# Patient Record
Sex: FEMALE | Race: WHITE | NOT HISPANIC OR LATINO | Employment: FULL TIME | URBAN - METROPOLITAN AREA
[De-identification: names, ages, dates, MRNs, and addresses within clinical notes are randomized per-mention and may not be internally consistent; named-entity substitution may affect disease eponyms.]

---

## 2017-01-24 DIAGNOSIS — E78.00 HIGH CHOLESTEROL: Primary | ICD-10-CM

## 2017-01-25 RX ORDER — SIMVASTATIN 40 MG
40 TABLET ORAL AT BEDTIME
Qty: 90 TABLET | Refills: 3 | Status: SHIPPED | OUTPATIENT
Start: 2017-01-25 | End: 2018-04-23

## 2017-01-25 NOTE — TELEPHONE ENCOUNTER
simvastatin (ZOCOR) 40 MG      Last Written Prescription Date:  10/24/16  Last Fill Quantity: 90,   # refills: 0  Last Office Visit : 10/24/16  Future Office visit:  3/14/17  Recent Labs   Lab Test  10/20/14   1325  08/05/13   1700   CHOL  174  189   HDL  70  59   LDL  89  102   TRIG  71  141   CHOLHDLRATIO  2.5  3.2   Routing refill request to provider for review/approval because:  OVER DUE FLP 2014

## 2017-03-14 ENCOUNTER — OFFICE VISIT (OUTPATIENT)
Dept: ENDOCRINOLOGY | Facility: CLINIC | Age: 47
End: 2017-03-14

## 2017-03-14 VITALS
SYSTOLIC BLOOD PRESSURE: 127 MMHG | DIASTOLIC BLOOD PRESSURE: 83 MMHG | HEIGHT: 60 IN | WEIGHT: 179.3 LBS | BODY MASS INDEX: 35.2 KG/M2 | HEART RATE: 87 BPM

## 2017-03-14 DIAGNOSIS — E10.9 TYPE 1 DIABETES MELLITUS WITHOUT COMPLICATION (H): Primary | ICD-10-CM

## 2017-03-14 LAB — HBA1C MFR BLD: 7.1 % (ref 4.3–6)

## 2017-03-14 NOTE — NURSING NOTE
Performed A1C test - patient tolerated well.    Irene Antoine CMA       Chief Complaint   Patient presents with     RECHECK     DIABETES TYPE 1 F/U        Initial /83 (BP Location: Right arm, Patient Position: Chair, Cuff Size: Adult Regular)  Pulse 87  Ht 1.524 m (5') Estimated body mass index is 34.69 kg/(m^2) as calculated from the following:    Height as of 10/24/16: 1.524 m (5').    Weight as of 10/24/16: 80.6 kg (177 lb 9.6 oz).  Medication Reconciliation: complete

## 2017-03-14 NOTE — MR AVS SNAPSHOT
After Visit Summary   3/14/2017    Coral Painting    MRN: [id number]           Patient Information     Date Of Birth          1970        Visit Information        Provider Department      3/14/2017 3:30 PM Henna Ness MD [facility]         Follow-ups after your visit        Follow-up notes from your care team     Return for f/u 3 mo with Melonie Coughlin and 6 mo with me.      Your next 10 appointments already scheduled     Jun 26, 2017  7:30 AM CDT   (Arrive by 7:15 AM)   RETURN DIABETES with MARLON Lucia [facility] End[facility] ([facility])    [address] [place] [id number] [phone]            Sep 26, 2017  2:30 PM CDT   (Arrive by 2:15 PM)   RETURN DIABETES with MD DAVE Steel [facility] Endocrinology ([facility])    [address] [place] [address] [phone]              Who to contact     Please call your clinic at [phone] to:    Ask questions about your health    Make or cancel appointments    Discuss your medicines    Learn about your test results    Speak to your doctor   If you have compliments or concerns about an experience at your clinic, or if you wish to file a complaint, please contact [facility] Patient Relations at [phone] or email us at [email or web address]         Additional Information About Your Visit        MyChart Information     DianDian is an electronic gateway that provides easy, online access to your medical records. With DianDian, you can request a clinic appointment, read your test results, renew a prescription or communicate with your care team.     To sign up for Needlet visit the website at [email or web address]   You will be asked to enter the access code listed below, as well as some personal information. Please follow the directions to create your username  and password.     Your access code is: [email or web address]  Expires: 2017  7:30 AM     Your access code will  in 90 days. If you need help or a new code, please contact your [facility] or call [phone] for assistance.        Care EveryWhere ID     This is your Care EveryWhere ID. This could be used by other organizations to access your [place] medical records  [id number]        Your Vitals Were     Pulse Height BMI (Body Mass Index)             87 1.524 m (5') 35.02 kg/m2          Blood Pressure from Last 3 Encounters:   17 127/83   10/24/16 126/85   07/21/15 114/68    Weight from Last 3 Encounters:   17 81.3 kg (179 lb 4.8 oz)   10/24/16 80.6 kg (177 lb 9.6 oz)   07/21/15 78.4 kg (172 lb 14.4 oz)              Today, you had the following     No orders found for display       Primary Care Provider Office Phone # Fax #    Sagrario Whitehorse [facility] [phone] [address] [address]        Thank you!     Thank you for choosing [facility] ENDOCRINOLOGY  for your care. Our goal is always to provide you with excellent care. Hearing back from our patients is one way we can continue to improve our services. Please take a few minutes to complete the written survey that you may receive in the mail after your visit with us. Thank you!             Your Updated Medication List - Protect others around you: Learn how to safely use, store and throw away your medicines at www.disposemymeds.org.          This list is accurate as of: 3/14/17  4:14 PM.  Always use your most recent med list.                   Brand Name Dispense Instructions for use    blood glucose lancing device     1 kit    Use as directed       blood glucose monitoring lancets     400 each    Test 4 times daily       blood glucose monitoring test strip    ONE TOUCH ULTRA    400 each    USE TO TEST FOUR TIMES A DAY       * HumaLOG KWIKpen 100 UNIT/ML injection   Generic  drug:  insulin lispro     3 Month    Use as directed up to 60 units per day       * HumaLOG KWIKpen 100 UNIT/ML injection   Generic drug:  insulin lispro     60 mL    Use as directed up to 60 units per day, Disp-3 Month, R-3, PUJA, E-Prescribe       * insulin pen needle 31G X 8 MM      Use 4 times daily.       * insulin pen needle 31G X 8 MM    B-D U/F    360 each    Use as directed for injections 4 times daily       * LANTUS SOLOSTAR 100 UNIT/ML injection   Generic drug:  insulin glargine     30 mL    Inject 23 Units Subcutaneous At Bedtime       * insulin glargine 100 UNIT/ML injection    LANTUS    25 mL    Inject 23 Units Subcutaneous At Bedtime       simvastatin 40 MG tablet    ZOCOR    90 tablet    Take 1 tablet (40 mg) by mouth At Bedtime       * Notice:  This list has 6 medication(s) that are the same as other medications prescribed for you. Read the directions carefully, and ask your doctor or other care provider to review them with you.

## 2017-03-14 NOTE — LETTER
3/14/2017       RE: Coral Painting  [address]   [facility] [address]     Dear Colleague,    Thank you for referring your patient, Coral Painting, to the [facility] ENDOCRINOLOGY at [facility]. Please see a copy of my visit note below.    This 46 year old woman is here for f/u of her type 1 diabetes and hyperlipemia. She takes lantus 23 units in the evening (9-10 pm) and novolog 3 units per carb.  She uses a correction of 1 to drop 25 with goal of 120. She forgot her meter at home today so we have no data to review.  Overall she thinks things are going pretty well.  She isn't surprised that her A1c went up from last visit. . She isn't having many lows.   Those she has are not troubling to her and are simply treated with juice.      She hasn't seen her eye MD for more than one  Year and will make an appt to see him soon. She has been taking her zocor regularly. at bedtime.  She has no feet concerns.  She is still having menses.      Current Outpatient Prescriptions on File Prior to Visit:  simvastatin (ZOCOR) 40 MG tablet Take 1 tablet (40 mg) by mouth At Bedtime   blood glucose monitoring (ONE TOUCH ULTRA) test strip USE TO TEST FOUR TIMES A DAY   HUMALOG KWIKPEN 100 UNIT/ML soln Use as directed up to 60 units per day, Disp-3 Month, R-3, PUJA, E-Prescribe   insulin glargine (LANTUS) 100 UNIT/ML PEN Inject 23 Units Subcutaneous At Bedtime   insulin pen needle (B-D U/F) 31G X 8 MM Use as directed for injections 4 times daily   LANTUS SOLOSTAR 100 UNIT/ML soln Inject 23 Units Subcutaneous At Bedtime   HUMALOG KWIKPEN SOLN 100 UNIT/ML Use as directed up to 60 units per day   SOFTCLIX LANCETS MISC Test 4 times daily   Lancets Misc. (ACCU-CHEK SOFTCLIX LANCET DEV) KIT Use as directed   Insulin Pen Needle 31G X 8 MM MISC Use 4 times daily.      No current facility-administered medications on file prior to visit.     ROS: 10 point ROS neg other than the symptoms noted  above in the HPI.    So Hx -  Feels a bit stressed.  She plans to look for a new job and get a new car.    Vital signs:   /83 (BP Location: Right arm, Patient Position: Chair, Cuff Size: Adult Regular)  Pulse 87  Ht 1.524 m (5')  Wt 81.3 kg (179 lb 4.8 oz)  BMI 35.02 kg/m2  Estimated body mass index is 35.02 kg/(m^2) as calculated from the following:    Height as of this encounter: 1.524 m (5').    Weight as of this encounter: 81.3 kg (179 lb 4.8 oz).    NAD    Recent Labs   Lab Test  10/24/16   0833  10/24/16   0823 10/24/16 07/21/15   10/20/14   1325  10/20/14   1254   08/05/13   1700   08/05/13   1642  02/04/13   1605   A1C   --    --    --    --    --    --    --    --    --    --   6.6*  7.5*   HEMOGLOBINA1   --    --   6.6*  7.4*   < >   --    --    < >   --    --    --    --    TSH   --   2.80   --    --    --    --    --    --    --    --    --    --    LDL   --    --    --    --    --   89   --    --   102   --    --    --    HDL   --    --    --    --    --   70   --    --   59   --    --    --    TRIG   --    --    --    --    --   71   --    --   141   --    --    --    CR   --   0.74   --    --    --   0.66   --    --   0.66   --    --    --    MICROL  6   --    --    --    --    --   10   --    --    < >   --    --     < > = values in this interval not displayed.       A1c today 7.1    Assessment and plan:    1.  Diabetes control. She is doing well.  No changes.    2.  Diabetes complications.  Up to date with screens and has none.    3. CVD risk.  Patient is on an appropriate dose of statin for the risk factors present.  BP is OK    F/u with Melonie Coughlin in 3 mo and me in 6 months    I spent 15 min with her  the majority of which were spent counseling about diabetes management    Henna Ness MD

## 2017-03-14 NOTE — PROGRESS NOTES
This 46 year old woman is here for f/u of her type 1 diabetes and hyperlipemia. She takes lantus 23 units in the evening (9-10 pm) and novolog 3 units per carb.  She uses a correction of 1 to drop 25 with goal of 120. She forgot her meter at home today so we have no data to review.  Overall she thinks things are going pretty well.  She isn't surprised that her A1c went up from last visit. . She isn't having many lows.   Those she has are not troubling to her and are simply treated with juice.      She hasn't seen her eye MD for more than one  Year and will make an appt to see him soon. She has been taking her zocor regularly. at bedtime.  She has no feet concerns.  She is still having menses.      Current Outpatient Prescriptions on File Prior to Visit:  simvastatin (ZOCOR) 40 MG tablet Take 1 tablet (40 mg) by mouth At Bedtime   blood glucose monitoring (ONE TOUCH ULTRA) test strip USE TO TEST FOUR TIMES A DAY   HUMALOG KWIKPEN 100 UNIT/ML soln Use as directed up to 60 units per day, Disp-3 Month, R-3, PUJA, E-Prescribe   insulin glargine (LANTUS) 100 UNIT/ML PEN Inject 23 Units Subcutaneous At Bedtime   insulin pen needle (B-D U/F) 31G X 8 MM Use as directed for injections 4 times daily   LANTUS SOLOSTAR 100 UNIT/ML soln Inject 23 Units Subcutaneous At Bedtime   HUMALOG KWIKPEN SOLN 100 UNIT/ML Use as directed up to 60 units per day   SOFTCLIX LANCETS MISC Test 4 times daily   Lancets Misc. (ACCU-CHEK SOFTCLIX LANCET DEV) KIT Use as directed   Insulin Pen Needle 31G X 8 MM MISC Use 4 times daily.      No current facility-administered medications on file prior to visit.     ROS: 10 point ROS neg other than the symptoms noted above in the HPI.    So Hx -  Feels a bit stressed.  She plans to look for a new job and get a new car.    Vital signs:   /83 (BP Location: Right arm, Patient Position: Chair, Cuff Size: Adult Regular)  Pulse 87  Ht 1.524 m (5')  Wt 81.3 kg (179 lb 4.8 oz)  BMI 35.02 kg/m2  Estimated body  mass index is 35.02 kg/(m^2) as calculated from the following:    Height as of this encounter: 1.524 m (5').    Weight as of this encounter: 81.3 kg (179 lb 4.8 oz).    NAD    Recent Labs   Lab Test  10/24/16   0833  10/24/16   0823 10/24/16 07/21/15   10/20/14   1325  10/20/14   1254   08/05/13   1700   08/05/13   1642  02/04/13   1605   A1C   --    --    --    --    --    --    --    --    --    --   6.6*  7.5*   HEMOGLOBINA1   --    --   6.6*  7.4*   < >   --    --    < >   --    --    --    --    TSH   --   2.80   --    --    --    --    --    --    --    --    --    --    LDL   --    --    --    --    --   89   --    --   102   --    --    --    HDL   --    --    --    --    --   70   --    --   59   --    --    --    TRIG   --    --    --    --    --   71   --    --   141   --    --    --    CR   --   0.74   --    --    --   0.66   --    --   0.66   --    --    --    MICROL  6   --    --    --    --    --   10   --    --    < >   --    --     < > = values in this interval not displayed.       A1c today 7.1    Assessment and plan:    1.  Diabetes control. She is doing well.  No changes.    2.  Diabetes complications.  Up to date with screens and has none.    3. CVD risk.  Patient is on an appropriate dose of statin for the risk factors present.  BP is OK    F/u with Melonie Coughlin in 3 mo and me in 6 months    I spent 15 min with her  the majority of which were spent counseling about diabetes management    Henna Ness MD

## 2017-08-14 ENCOUNTER — OFFICE VISIT (OUTPATIENT)
Dept: ENDOCRINOLOGY | Facility: CLINIC | Age: 47
End: 2017-08-14

## 2017-08-14 VITALS
HEART RATE: 89 BPM | BODY MASS INDEX: 34.67 KG/M2 | SYSTOLIC BLOOD PRESSURE: 130 MMHG | DIASTOLIC BLOOD PRESSURE: 79 MMHG | HEIGHT: 60 IN | WEIGHT: 176.6 LBS

## 2017-08-14 DIAGNOSIS — E78.2 MIXED HYPERLIPIDEMIA: Primary | ICD-10-CM

## 2017-08-14 DIAGNOSIS — E10.9 WELL CONTROLLED TYPE 1 DIABETES MELLITUS (H): ICD-10-CM

## 2017-08-14 DIAGNOSIS — R53.83 OTHER FATIGUE: ICD-10-CM

## 2017-08-14 DIAGNOSIS — E78.2 MIXED HYPERLIPIDEMIA: ICD-10-CM

## 2017-08-14 LAB
ANION GAP SERPL CALCULATED.3IONS-SCNC: 9 MMOL/L (ref 3–14)
BUN SERPL-MCNC: 10 MG/DL (ref 7–30)
CALCIUM SERPL-MCNC: 8.5 MG/DL (ref 8.5–10.1)
CHLORIDE SERPL-SCNC: 105 MMOL/L (ref 94–109)
CHOLEST SERPL-MCNC: 167 MG/DL
CO2 SERPL-SCNC: 24 MMOL/L (ref 20–32)
CREAT SERPL-MCNC: 0.66 MG/DL (ref 0.52–1.04)
GFR SERPL CREATININE-BSD FRML MDRD: ABNORMAL ML/MIN/1.7M2
GLUCOSE SERPL-MCNC: 183 MG/DL (ref 70–99)
HBA1C MFR BLD: 7.4 % (ref 4.3–6)
HDLC SERPL-MCNC: 66 MG/DL
LDLC SERPL CALC-MCNC: 87 MG/DL
NONHDLC SERPL-MCNC: 101 MG/DL
POTASSIUM SERPL-SCNC: 4 MMOL/L (ref 3.4–5.3)
SODIUM SERPL-SCNC: 138 MMOL/L (ref 133–144)
TRIGL SERPL-MCNC: 74 MG/DL
TSH SERPL DL<=0.005 MIU/L-ACNC: 2.17 MU/L (ref 0.4–4)

## 2017-08-14 RX ORDER — INSULIN LISPRO 100 [IU]/ML
INJECTION, SOLUTION INTRAVENOUS; SUBCUTANEOUS
Qty: 60 ML | Refills: 3 | Status: SHIPPED | OUTPATIENT
Start: 2017-08-14 | End: 2018-08-20

## 2017-08-14 NOTE — NURSING NOTE
Chief Complaint   Patient presents with     RECHECK     DIABETES TYPE 1 F/U        Initial /79 (BP Location: Right arm, Patient Position: Sitting, Cuff Size: Adult Regular)  Pulse 89  Ht 1.524 m (5')  Wt 80.1 kg (176 lb 9.6 oz)  BMI 34.49 kg/m2 Estimated body mass index is 34.49 kg/(m^2) as calculated from the following:    Height as of this encounter: 1.524 m (5').    Weight as of this encounter: 80.1 kg (176 lb 9.6 oz).  Medication Reconciliation: complete       Performed A1C test - patient tolerated well.    Irene Antoine, CMA

## 2017-08-14 NOTE — LETTER
8/14/2017       RE: Coral Painting  [address]   [place] [address]     Dear Colleague,    Thank you for referring your patient, Coral Painting, to the [facility] ENDOCRINOLOGY at [facility]. Please see a copy of my visit note below.    HPI:  Ms. Painting is a 45 yo woman here for f/u of type 1 diabetes.  Blood sugars have been running quite high lately and is not sure why.  She has been mostly over 200, mostly over 250-300 mg/dL. She forgot her meter today.  She has had to do numerous corrections throughout the day for the past 1-2 months.  She raised her Lantus to 32 units (she had been on 23 units prior to that).  She has had no fevers or chills, no changes in her weight.  No night sweats.  Her diet has not changed much and neither has her activity level.  She does have a lot of work stress and is looking for another job.  Humalog 3 units/15g CHO with meals plus correction of 1/25 over 175 mg/dL.  She had a recent normal eye exam.  She continues on simvastatin.  She has no other concerns today.        ROS   GENERAL: lost 5# since last visit. no fevers, chills, malaise, night sweats.   HEENT: no dysphagia, diplopia, neck pain or tenderness, dry/scratchy eyes, URI, cough, sinus drainage, tinnitus, sinus pressure  CV: no chest pain, pressure, palpitations, skipped beats, LOC  LUNGS: no SOB, MCGOVERN, cough, sputum production, wheezing   ABDOMEN: no diarrhea, constipation, abdominal pain  EXTREMITIES: no rashes, ulcers, edema.  Right shoulder limitation in ROM- seeing physical therapy.  NEUROLOGY: no changes in vision, tingling or numbness in hands or feet.   MSK: no muscle aches or pains, weakness    Personal Hx   Is a  at the [employer].  Lives alone in a condo.  Drinks socially once a week or so.    Past Medical History  Dyslipidemia  Type I Diabetes Mellitus in her late 20's. Initially diagnosed as type 2, then found to have antibody  positivity.    Physical Exam   /79 (BP Location: Right arm, Patient Position: Sitting, Cuff Size: Adult Regular)  Pulse 89  Ht 1.524 m (5')  Wt 80.1 kg (176 lb 9.6 oz)  BMI 34.49 kg/m2  GENERAL: Alert and oriented X3, NAD, well dressed, answering questions appropriately, appears stated age.  EXTREMITIES: no edema, +pulses, no rashes, no lesions    RESULTS  Lab Results   Component Value Date    A1C 6.6 08/05/2013    A1C 7.5 02/04/2013    A1C 7.3 08/01/2012    A1C 7.2 01/25/2012    A1C 6.8 10/03/2011       TSH   Date Value Ref Range Status   10/24/2016 2.80 0.40 - 4.00 mU/L Final   08/13/2007 1.86 0.4 - 5.0 mU/L Final     T4 Free   Date Value Ref Range Status   08/13/2007 1.02 0.70 - 1.85 ng/dL Final       Creatinine   Date Value Ref Range Status   10/24/2016 0.74 0.52 - 1.04 mg/dL Final   10/20/2014 0.66 0.52 - 1.04 mg/dL Final   ]    No results found for: ALBUMIN]    ALT   Date Value Ref Range Status   06/04/2012 12 0 - 50 U/L Final   04/21/2011 15 0 - 50 U/L Final   ]    Recent Labs   Lab Test  10/20/14   1325  08/05/13   1700   CHOL  174  189   HDL  70  59   LDL  89  102   TRIG  71  141   CHOLHDLRATIO  2.5  3.2       No results found for: B12]    Assessment   Assessment/Plan:      1.  Type 1 DM- Overall, running higher.  A1c is up to 7.4%.  Will make the following changes (instructions given to patient):  Work on walking daily.  This will  Improve your insulin sensitivity.      Increase Humalog to 4 units per 15g of carb.      Consider using Victoza or Trulicity- these medications can help your post-meal blood sugars improve and help you lose weight.      Send Melonie in a couple weeks with an update.      Let me know if you would like to try one of the above medications.      Schedule annual exam with your primary physician and an eye exam.      2.  Risk factors- BP and lipids under good control.  No DM complications.   Will schedule eye exam and check MA, creatinine and TSH, lipids today.     3.  F/U in  6 weeks with Dr. Ness.  Call sooner with concerns.      I spent 30 minutes with this patient face to face and explained the conditions and plans (more than 50% of time was counseling/coordination of care, diabetes management, follow up plan for worsening hyper and hypoglycemia) . The patient understood and is satisfied with today's visit.          Melonie Coughlin PA-C, MPAS   [facility]  Department of Medicine  Division of Endocrinology and Diabetes

## 2017-08-14 NOTE — PROGRESS NOTES
HPI:  Ms. Painting is a 47 yo woman here for f/u of type 1 diabetes.  Blood sugars have been running quite high lately and is not sure why.  She has been mostly over 200, mostly over 250-300 mg/dL. She forgot her meter today.  She has had to do numerous corrections throughout the day for the past 1-2 months.  She raised her Lantus to 32 units (she had been on 23 units prior to that).  She has had no fevers or chills, no changes in her weight.  No night sweats.  Her diet has not changed much and neither has her activity level.  She does have a lot of work stress and is looking for another job.  Humalog 3 units/15g CHO with meals plus correction of 1/25 over 175 mg/dL.  She had a recent normal eye exam.  She continues on simvastatin.  She has no other concerns today.        ROS   GENERAL: lost 5# since last visit. no fevers, chills, malaise, night sweats.   HEENT: no dysphagia, diplopia, neck pain or tenderness, dry/scratchy eyes, URI, cough, sinus drainage, tinnitus, sinus pressure  CV: no chest pain, pressure, palpitations, skipped beats, LOC  LUNGS: no SOB, MCGOVERN, cough, sputum production, wheezing   ABDOMEN: no diarrhea, constipation, abdominal pain  EXTREMITIES: no rashes, ulcers, edema.  Right shoulder limitation in ROM- seeing physical therapy.  NEUROLOGY: no changes in vision, tingling or numbness in hands or feet.   MSK: no muscle aches or pains, weakness    Personal Hx   Is a  at the [employer].  Lives alone in a condo.  Drinks socially once a week or so.    Past Medical History  Dyslipidemia  Type I Diabetes Mellitus in her late 20's. Initially diagnosed as type 2, then found to have antibody positivity.    Physical Exam   /79 (BP Location: Right arm, Patient Position: Sitting, Cuff Size: Adult Regular)  Pulse 89  Ht 1.524 m (5')  Wt 80.1 kg (176 lb 9.6 oz)  BMI 34.49 kg/m2  GENERAL: Alert and oriented X3, NAD, well dressed, answering questions appropriately, appears stated  age.  EXTREMITIES: no edema, +pulses, no rashes, no lesions    RESULTS  Lab Results   Component Value Date    A1C 6.6 08/05/2013    A1C 7.5 02/04/2013    A1C 7.3 08/01/2012    A1C 7.2 01/25/2012    A1C 6.8 10/03/2011       TSH   Date Value Ref Range Status   10/24/2016 2.80 0.40 - 4.00 mU/L Final   08/13/2007 1.86 0.4 - 5.0 mU/L Final     T4 Free   Date Value Ref Range Status   08/13/2007 1.02 0.70 - 1.85 ng/dL Final       Creatinine   Date Value Ref Range Status   10/24/2016 0.74 0.52 - 1.04 mg/dL Final   10/20/2014 0.66 0.52 - 1.04 mg/dL Final   ]    No results found for: ALBUMIN]    ALT   Date Value Ref Range Status   06/04/2012 12 0 - 50 U/L Final   04/21/2011 15 0 - 50 U/L Final   ]    Recent Labs   Lab Test  10/20/14   1325  08/05/13   1700   CHOL  174  189   HDL  70  59   LDL  89  102   TRIG  71  141   CHOLHDLRATIO  2.5  3.2       No results found for: B12]    Assessment   Assessment/Plan:      1.  Type 1 DM- Overall, running higher.  A1c is up to 7.4%.  Will make the following changes (instructions given to patient):  Work on walking daily.  This will  Improve your insulin sensitivity.      Increase Humalog to 4 units per 15g of carb.      Consider using Victoza or Trulicity- these medications can help your post-meal blood sugars improve and help you lose weight.      Send Melonie in a couple weeks with an update.      Let me know if you would like to try one of the above medications.      Schedule annual exam with your primary physician and an eye exam.      2.  Risk factors- BP and lipids under good control.  No DM complications.   Will schedule eye exam and check MA, creatinine and TSH, lipids today.     3.  F/U in 6 weeks with Dr. Ness.  Call sooner with concerns.      I spent 30 minutes with this patient face to face and explained the conditions and plans (more than 50% of time was counseling/coordination of care, diabetes management, follow up plan for worsening hyper and hypoglycemia) . The patient  understood and is satisfied with today's visit.          Melonie Coughlin PA-C, MPAS   [facility]  Department of Medicine  Division of Endocrinology and Diabetes

## 2017-08-14 NOTE — PATIENT INSTRUCTIONS
Work on walking daily.  This will  Improve your insulin sensitivity.      Increase Humalog to 4 units per 15g of carb.      Consider using Victoza or Trulicity- these medications can help your post-meal blood sugars improve and help you lose weight.      Send Melonie in a couple weeks with an update.      Let me know if you would like to try one of the above medications.      Schedule annual exam with your primary physician and an eye exam.

## 2017-08-14 NOTE — LETTER
Patient:  Coral Painting  :   1970  MRN:     [id number]        Ms.Tracy Painting  [address]   [place] [address]        2017    Dear ,    We are writing to inform you of your test results.  Your labs look great!      Resulted Orders   Basic metabolic panel   Result Value Ref Range    Sodium 138 133 - 144 mmol/L    Potassium 4.0 3.4 - 5.3 mmol/L    Chloride 105 94 - 109 mmol/L    Carbon Dioxide 24 20 - 32 mmol/L    Anion Gap 9 3 - 14 mmol/L    Glucose 183 (H) 70 - 99 mg/dL    Urea Nitrogen 10 7 - 30 mg/dL    Creatinine 0.66 0.52 - 1.04 mg/dL    GFR Estimate >90  Non  GFR Calc   >60 mL/min/1.7m2    GFR Estimate If Black >90   GFR Calc   >60 mL/min/1.7m2    Calcium 8.5 8.5 - 10.1 mg/dL   Hemoglobin A1c POCT   Result Value Ref Range    Hemoglobin A1C 7.4 (A) 4.3 - 6 %   It was a pleasure to see you in clinic.    Please let me know if you have any questions or concerns about this information. The best way to reach me or one of the endocrine nurses is to send a [employer] message or call [phone].      Sincerely,       Melonie Coughlin PA-C, MPAS

## 2017-08-14 NOTE — MR AVS SNAPSHOT
After Visit Summary   8/14/2017    Coral Painting    MRN: [id number]           Patient Information     Date Of Birth          1970        Visit Information        Provider Department      8/14/2017 7:30 AM Melonie Coughlin PA-C [facility]        Today's Diagnoses     Mixed hyperlipidemia    -  1    Well controlled type 1 diabetes mellitus (H)        Other fatigue          Care Instructions    Work on walking daily.  This will  Improve your insulin sensitivity.      Increase Humalog to 4 units per 15g of carb.      Consider using Victoza or Trulicity- these medications can help your post-meal blood sugars improve and help you lose weight.      Send Melonie in a couple weeks with an update.      Let me know if you would like to try one of the above medications.      Schedule annual exam with your primary physician and an eye exam.                 Follow-ups after your visit        Your next 10 appointments already scheduled     Sep 26, 2017  2:30 PM CDT   (Arrive by 2:15 PM)   RETURN DIABETES with MD DAVE Steel [facility] Endocrinology ([facility])    [address] [place] [address] [phone]              Future tests that were ordered for you today     Open Future Orders        Priority Expected Expires Ordered    TSH with free T4 reflex Routine  8/14/2018 8/14/2017    Lipid Profile Routine  8/14/2018 8/14/2017            Who to contact     Please call your clinic at [phone] to:    Ask questions about your health    Make or cancel appointments    Discuss your medicines    Learn about your test results    Speak to your doctor   If you have compliments or concerns about an experience at your clinic, or if you wish to file a complaint, please contact [facility] Patient Relations at [phone] or email us at [email or web address]         Additional Information About Your Visit         Ubersense Information     U[employer] is an electronic gateway that provides easy, online access to your medical records. With Ubersense, you can request a clinic appointment, read your test results, renew a prescription or communicate with your care team.     To sign up for U[employer] visit the website at [email or web address]   You will be asked to enter the access code listed below, as well as some personal information. Please follow the directions to create your username and password.     Your access code is: [email or web address]  Expires: 9/10/2017  6:31 AM     Your access code will  in 90 days. If you need help or a new code, please contact your [facility] or call [phone] for assistance.        Care EveryWhere ID     This is your Care EveryWhere ID. This could be used by other organizations to access your [place] medical records  [id number]        Your Vitals Were     Pulse Height BMI (Body Mass Index)             89 1.524 m (5') 34.49 kg/m2          Blood Pressure from Last 3 Encounters:   17 130/79   17 127/83   10/24/16 126/85    Weight from Last 3 Encounters:   17 80.1 kg (176 lb 9.6 oz)   17 81.3 kg (179 lb 4.8 oz)   10/24/16 80.6 kg (177 lb 9.6 oz)              We Performed the Following     Basic metabolic panel          Today's Medication Changes          These changes are accurate as of: 17  8:33 AM.  If you have any questions, ask your nurse or doctor.               These medicines have changed or have updated prescriptions.        Dose/Directions    * HumaLOG KWIKpen 100 UNIT/ML injection   This may have changed:  Another medication with the same name was changed. Make sure you understand how and when to take each.   Used for:  Diabetes mellitus type 1 (H)   Generic drug:  insulin lispro        Use as directed up to 60 units per day   Quantity:  3 Month   Refills:  3       * HumaLOG KWIKpen 100 UNIT/ML injection   This may have  changed:  additional instructions   Used for:  Well controlled type 1 diabetes mellitus (H)   Generic drug:  insulin lispro        Use as directed up to 80 units per day, Disp-3 Month, R-3, PUJA, E-Prescribe   Quantity:  60 mL   Refills:  3       * Notice:  This list has 2 medication(s) that are the same as other medications prescribed for you. Read the directions carefully, and ask your doctor or other care provider to review them with you.         Where to get your medicines      These medications were sent to [facility] - [address]  [address] [place] [id number]     Phone:  [phone]     HumaLOG KWIKpen 100 UNIT/ML injection    insulin glargine 100 UNIT/ML injection    insulin pen needle 31G X 8 MM                Primary Care Provider Office Phone # Fax #    Sagrario Glass [facility] [phone] [address] [facility] [address]        Equal Access to Services     TJ SOTO AH: Hadii marta ku hadasho Soomaali, waaxda luqadaha, qaybta kaalmada adeegyada, waxay murphyin haysilvio velasquez . So [facility] [phone].    ATENCIÓN: Si davidson dozier, tiene a wagoner disposición servicios gratuitos de asistencia lingüística. LlOhio[facility] [phone].    We comply with applicable federal civil rights laws and [place] laws. We do not discriminate on the basis of race, color, national origin, age, disability sex, sexual orientation or gender identity.            Thank you!     Thank you for choosing [facility] ENDOCRINOLOGY  for your care. Our goal is always to provide you with excellent care. Hearing back from our patients is one way we can continue to improve our services. Please take a few minutes to complete the written survey that you may receive in the mail after your visit with us. Thank you!             Your Updated Medication List - Protect others around you: Learn how to safely use, store and throw away your medicines at  www.disposemymeds.org.          This list is accurate as of: 8/14/17  8:33 AM.  Always use your most recent med list.                   Brand Name Dispense Instructions for use Diagnosis    blood glucose lancing device     1 kit    Use as directed    Type 1 diabetes mellitus (H)       blood glucose monitoring lancets     400 each    Test 4 times daily    Type 1 diabetes mellitus (H)       blood glucose monitoring test strip    ONE TOUCH ULTRA    400 each    USE TO TEST FOUR TIMES A DAY    Well controlled type 1 diabetes mellitus (H)       * HumaLOG KWIKpen 100 UNIT/ML injection   Generic drug:  insulin lispro     3 Month    Use as directed up to 60 units per day    Diabetes mellitus type 1 (H)       * HumaLOG KWIKpen 100 UNIT/ML injection   Generic drug:  insulin lispro     60 mL    Use as directed up to 80 units per day, Disp-3 Month, R-3, PUJA, E-Prescribe    Well controlled type 1 diabetes mellitus (H)       * insulin pen needle 31G X 8 MM    B-D U/F    360 each    Use as directed for injections 4 times daily    Well controlled type 1 diabetes mellitus (H)       * insulin pen needle 31G X 8 MM     100 each    Use 4 times daily    Well controlled type 1 diabetes mellitus (H)       * LANTUS SOLOSTAR 100 UNIT/ML injection   Generic drug:  insulin glargine     30 mL    Inject 23 Units Subcutaneous At Bedtime    Type 1 diabetes mellitus with hyperglycemia (H)       * insulin glargine 100 UNIT/ML injection    LANTUS    30 mL    Inject 32 Units Subcutaneous At Bedtime    Well controlled type 1 diabetes mellitus (H)       simvastatin 40 MG tablet    ZOCOR    90 tablet    Take 1 tablet (40 mg) by mouth At Bedtime    High cholesterol       * Notice:  This list has 6 medication(s) that are the same as other medications prescribed for you. Read the directions carefully, and ask your doctor or other care provider to review them with you.

## 2017-11-02 DIAGNOSIS — E10.9 TYPE 1 DIABETES MELLITUS (H): Primary | ICD-10-CM

## 2017-11-06 ENCOUNTER — OFFICE VISIT (OUTPATIENT)
Dept: ENDOCRINOLOGY | Facility: CLINIC | Age: 47
End: 2017-11-06

## 2017-11-06 VITALS
SYSTOLIC BLOOD PRESSURE: 130 MMHG | HEIGHT: 60 IN | HEART RATE: 98 BPM | BODY MASS INDEX: 35.14 KG/M2 | DIASTOLIC BLOOD PRESSURE: 81 MMHG | WEIGHT: 179 LBS

## 2017-11-06 DIAGNOSIS — E10.9 TYPE 1 DIABETES MELLITUS (H): ICD-10-CM

## 2017-11-06 DIAGNOSIS — E11.9 WELL CONTROLLED TYPE 2 DIABETES MELLITUS (H): Primary | ICD-10-CM

## 2017-11-06 DIAGNOSIS — Z23 NEED FOR PROPHYLACTIC VACCINATION AND INOCULATION AGAINST INFLUENZA: ICD-10-CM

## 2017-11-06 LAB
CREAT SERPL-MCNC: 0.75 MG/DL (ref 0.52–1.04)
CREAT UR-MCNC: 198 MG/DL
GFR SERPL CREATININE-BSD FRML MDRD: 83 ML/MIN/1.7M2
HBA1C MFR BLD: 7.3 % (ref 4.3–6)
HBA1C MFR BLD: 7.7 % (ref 4.3–6)
MICROALBUMIN UR-MCNC: 14 MG/L
MICROALBUMIN/CREAT UR: 6.92 MG/G CR (ref 0–25)

## 2017-11-06 ASSESSMENT — PAIN SCALES - GENERAL: PAINLEVEL: NO PAIN (0)

## 2017-11-06 NOTE — MR AVS SNAPSHOT
After Visit Summary   11/6/2017    Coral Painting    MRN: [id number]           Patient Information     Date Of Birth          1970        Visit Information        Provider Department      11/6/2017 1:30 PM Melonie Coughlin PA-C [facility]        Today's Diagnoses     Well controlled type 2 diabetes mellitus (H)    -  1      Care Instructions    Consider GLP-1 agonists (Victoza, Trulicity)  Or SGLT-2 inhibitors (Jardiance, invokana, farxiga)    Let me know if you would like to try one of these.      Schedule annual exam with your primary physician and an eye exam.                     Follow-ups after your visit        Follow-up notes from your care team     Return in about 3 months (around 2/6/2018).      Your next 10 appointments already scheduled     Feb 12, 2018  7:30 AM CST   (Arrive by 7:15 AM)   RETURN DIABETES with MARLON Lucia [facility] Endocrinology ([facility])    [address] [place] [address] [phone]            May 07, 2018  4:00 PM CDT   (Arrive by 3:45 PM)   RETURN DIABETES with MD DAVE Steel [facility] End[facility]inology ([facility])    [address] [place] [address] [phone]              Who to contact     Please call your clinic at [phone] to:    Ask questions about your health    Make or cancel appointments    Discuss your medicines    Learn about your test results    Speak to your doctor   If you have compliments or concerns about an experience at your clinic, or if you wish to file a complaint, please contact [facility] Patient Relations at [phone] or email us at [email or web address]         Additional Information About Your Visit        Performance Gen[employer]hart Information     [employer] is an electronic gateway that provides easy, online access to your medical records. With [employer], you can request a  clinic appointment, read your test results, renew a prescription or communicate with your care team.     To sign up for [facility] Recovery Centert visit the website at [email or web address]   You will be asked to enter the access code listed below, as well as some personal information. Please follow the directions to create your username and password.     Your access code is: [email or web address]  Expires: 2017  5:30 AM     Your access code will  in 90 days. If you need help or a new code, please contact your [facility] or call [phone] for assistance.        Care EveryWhere ID     This is your Care EveryWhere ID. This could be used by other organizations to access your [place] medical records  [id number]        Your Vitals Were     Pulse Height BMI (Body Mass Index)             98 1.524 m (5') 34.96 kg/m2          Blood Pressure from Last 3 Encounters:   17 130/81   17 130/79   17 127/83    Weight from Last 3 Encounters:   17 81.2 kg (179 lb)   17 80.1 kg (176 lb 9.6 oz)   17 81.3 kg (179 lb 4.8 oz)              We Performed the Following     ADMIN INFLUENZA VIRUS VAC ()          Today's Medication Changes          These changes are accurate as of: 17  2:34 PM.  If you have any questions, ask your nurse or doctor.               These medicines have changed or have updated prescriptions.        Dose/Directions    HumaLOG KWIKpen 100 UNIT/ML injection   This may have changed:  Another medication with the same name was removed. Continue taking this medication, and follow the directions you see here.   Used for:  Well controlled type 1 diabetes mellitus (H)   Generic drug:  insulin lispro   Changed by:  Melonie Coughlni PA-C        Use as directed up to 80 units per day, Disp-3 Month, R-3, PUJA, E-Prescribe   Quantity:  60 mL   Refills:  3       insulin glargine 100 UNIT/ML injection   Commonly known as:  LANTUS   This may have changed:    - how  much to take  - Another medication with the same name was removed. Continue taking this medication, and follow the directions you see here.   Used for:  Well controlled type 1 diabetes mellitus (H)   Changed by:  Melonie Coughlin PA-C        Dose:  32 Units   Inject 32 Units Subcutaneous At Bedtime   Quantity:  30 mL   Refills:  3                Primary Care Provider Office Phone # Fax #    Sagrario Glass [facility] [phone] [address] [facility] [address]        Equal Access to Services     TJ STOO : Hadii aad ku hadasho Soomaali, waaxda luqadaha, qaybta kaalmada adeegyada, waxay idiin hayaan ramone velasquez . So [facility] [phone].    ATENCIÓN: Si habla español, tiene a wagoner disposición servicios gratuitos de asistencia lingüística. Llame al [phone].    We comply with applicable federal civil rights laws and [place] laws. We do not discriminate on the basis of race, color, national origin, age, disability, sex, sexual orientation, or gender identity.            Thank you!     Thank you for choosing [facility] ENDOCRINOLOGY  for your care. Our goal is always to provide you with excellent care. Hearing back from our patients is one way we can continue to improve our services. Please take a few minutes to complete the written survey that you may receive in the mail after your visit with us. Thank you!             Your Updated Medication List - Protect others around you: Learn how to safely use, store and throw away your medicines at www.disposemymeds.org.          This list is accurate as of: 11/6/17  2:34 PM.  Always use your most recent med list.                   Brand Name Dispense Instructions for use Diagnosis    blood glucose lancing device     1 kit    Use as directed    Type 1 diabetes mellitus (H)       blood glucose monitoring lancets     400 each    Test 4 times daily    Type 1 diabetes mellitus (H)       blood glucose monitoring test strip    ONETOUCH  ULTRA    400 each    USE TO TEST FOUR TIMES A DAY    Well controlled type 1 diabetes mellitus (H)       HumaLOG KWIKpen 100 UNIT/ML injection   Generic drug:  insulin lispro     60 mL    Use as directed up to 80 units per day, Disp-3 Month, R-3, PUJA, E-Prescribe    Well controlled type 1 diabetes mellitus (H)       insulin glargine 100 UNIT/ML injection    LANTUS    30 mL    Inject 32 Units Subcutaneous At Bedtime    Well controlled type 1 diabetes mellitus (H)       * insulin pen needle 31G X 8 MM    B-D U/F    360 each    Use as directed for injections 4 times daily    Well controlled type 1 diabetes mellitus (H)       * insulin pen needle 31G X 8 MM     100 each    Use 4 times daily    Well controlled type 1 diabetes mellitus (H)       simvastatin 40 MG tablet    ZOCOR    90 tablet    Take 1 tablet (40 mg) by mouth At Bedtime    High cholesterol       * Notice:  This list has 2 medication(s) that are the same as other medications prescribed for you. Read the directions carefully, and ask your doctor or other care provider to review them with you.

## 2017-11-06 NOTE — NURSING NOTE
Chief Complaint   Patient presents with     RECHECK     F/U TYPE I DM     Meera Osborn, [facility]  Endocrinology & Diabetes 3G

## 2017-11-06 NOTE — PATIENT INSTRUCTIONS
Consider GLP-1 agonists (Victoza, Trulicity)  Or SGLT-2 inhibitors (Jardiance, invokana, farxiga)    Let me know if you would like to try one of these.      Schedule annual exam with your primary physician and an eye exam.

## 2017-11-06 NOTE — LETTER
11/6/2017       RE: Coral Painting  [address]   [facility] [address]     Dear Colleague,    Thank you for referring your patient, Coral Painting, to the [facility]. Please see a copy of my visit note below.    HPI:  Ms. Painting is a 47 yo woman here for f/u of type 1 diabetes.  Blood sugars have been much better lately, but she had to increase her insulin quite a lot.  She has mostly been running under 175 mg/dL.  She has had a few lows.  She forgot her meter today.  She raised her Lantus to 41 units (she had been on 23 units a couple visits ago).  She has had no fevers or chills, no changes in her weight.  Her diet has not changed much and neither has her activity level.  She does have a lot of work stress and is looking for another job.  Humalog 3 units/15g CHO with meals plus correction of 1/25 over 175 mg/dL.  She had a recent normal eye exam.  She continues on simvastatin.  She has no other concerns today.        ROS   GENERAL: gained a few pounds since last visit. no fevers, chills, malaise, night sweats.   HEENT: no dysphagia, diplopia, neck pain or tenderness, dry/scratchy eyes, URI, cough, sinus drainage, tinnitus, sinus pressure  CV: no chest pain, pressure, palpitations, skipped beats, LOC  LUNGS: no SOB, MCGOVERN, cough, sputum production, wheezing   ABDOMEN: no diarrhea, constipation, abdominal pain  EXTREMITIES: no rashes, ulcers, edema.  Right shoulder limitation in ROM- seeing physical therapy.  NEUROLOGY: no changes in vision, tingling or numbness in hands or feet.   MSK: no muscle aches or pains, weakness    Personal Hx   Is a  at the [employer].  Lives alone in a condo.  Drinks socially once a week or so.    Past Medical History  Dyslipidemia  Type I Diabetes Mellitus in her late 20's. Initially diagnosed as type 2, then found to have antibody positivity.    Physical Exam   /81  Pulse 98  Ht  1.524 m (5')  Wt 81.2 kg (179 lb)  BMI 34.96 kg/m2  GENERAL: Alert and oriented X3, NAD, well dressed, answering questions appropriately, appears stated age.  EXTREMITIES: no edema, +pulses, no rashes, no lesions    RESULTS  Lab Results   Component Value Date    A1C 6.6 08/05/2013    A1C 7.5 02/04/2013    A1C 7.3 08/01/2012    A1C 7.2 01/25/2012    A1C 6.8 10/03/2011       TSH   Date Value Ref Range Status   08/14/2017 2.17 0.40 - 4.00 mU/L Final   10/24/2016 2.80 0.40 - 4.00 mU/L Final   08/13/2007 1.86 0.4 - 5.0 mU/L Final     T4 Free   Date Value Ref Range Status   08/13/2007 1.02 0.70 - 1.85 ng/dL Final       Creatinine   Date Value Ref Range Status   11/06/2017 0.75 0.52 - 1.04 mg/dL Final   08/14/2017 0.66 0.52 - 1.04 mg/dL Final   ]    No results found for: ALBUMIN]    ALT   Date Value Ref Range Status   06/04/2012 12 0 - 50 U/L Final   04/21/2011 15 0 - 50 U/L Final   ]    Recent Labs   Lab Test  08/14/17   0907  10/20/14   1325  08/05/13   1700   CHOL  167  174  189   HDL  66  70  59   LDL  87  89  102   TRIG  74  71  141   CHOLHDLRATIO   --   2.5  3.2       No results found for: B12]    Assessment   Assessment/Plan:      1.  Type 1 DM- Overall, doing better.  Will continue current plan and will consider GLP-1 or SGLT2 in the future.  She will check on insurance coverage.       2.  Risk factors- BP and lipids under good control.  No DM complications.   Creatinine is normal and normal MA, creatinine and TSH.      3.  F/U in 3 months with Dr. Ness.  Call sooner with concerns.      I spent 30 minutes with this patient face to face and explained the conditions and plans (more than 50% of time was counseling/coordination of care, diabetes management, follow up plan for worsening hyper and hypoglycemia) . The patient understood and is satisfied with today's visit.     Melonie Coughlin PA-C, MPAS   [facility]  Department of Medicine  Division of Endocrinology and Diabetes        Injectable  Influenza Immunization Documentation    1.  Is the person to be vaccinated sick today?   No    2. Does the person to be vaccinated have an allergy to a component   of the vaccine?   No  Egg Allergy Algorithm Link    3. Has the person to be vaccinated ever had a serious reaction   to influenza vaccine in the past?   No    4. Has the person to be vaccinated ever had Guillain-Barré syndrome?   No    Form completed by GÓMEZ SERRANO

## 2017-11-06 NOTE — PROGRESS NOTES
HPI:  Ms. Painting is a 45 yo woman here for f/u of type 1 diabetes.  Blood sugars have been much better lately, but she had to increase her insulin quite a lot.  She has mostly been running under 175 mg/dL.  She has had a few lows.  She forgot her meter today.  She raised her Lantus to 41 units (she had been on 23 units a couple visits ago).  She has had no fevers or chills, no changes in her weight.  Her diet has not changed much and neither has her activity level.  She does have a lot of work stress and is looking for another job.  Humalog 3 units/15g CHO with meals plus correction of 1/25 over 175 mg/dL.  She had a recent normal eye exam.  She continues on simvastatin.  She has no other concerns today.        ROS   GENERAL: gained a few pounds since last visit. no fevers, chills, malaise, night sweats.   HEENT: no dysphagia, diplopia, neck pain or tenderness, dry/scratchy eyes, URI, cough, sinus drainage, tinnitus, sinus pressure  CV: no chest pain, pressure, palpitations, skipped beats, LOC  LUNGS: no SOB, MCGOVERN, cough, sputum production, wheezing   ABDOMEN: no diarrhea, constipation, abdominal pain  EXTREMITIES: no rashes, ulcers, edema.  Right shoulder limitation in ROM- seeing physical therapy.  NEUROLOGY: no changes in vision, tingling or numbness in hands or feet.   MSK: no muscle aches or pains, weakness    Personal Hx   Is a  at the [employer].  Lives alone in a condo.  Drinks socially once a week or so.    Past Medical History  Dyslipidemia  Type I Diabetes Mellitus in her late 20's. Initially diagnosed as type 2, then found to have antibody positivity.    Physical Exam   /81  Pulse 98  Ht 1.524 m (5')  Wt 81.2 kg (179 lb)  BMI 34.96 kg/m2  GENERAL: Alert and oriented X3, NAD, well dressed, answering questions appropriately, appears stated age.  EXTREMITIES: no edema, +pulses, no rashes, no lesions    RESULTS  Lab Results   Component Value Date    A1C 6.6 08/05/2013    A1C 7.5  02/04/2013    A1C 7.3 08/01/2012    A1C 7.2 01/25/2012    A1C 6.8 10/03/2011       TSH   Date Value Ref Range Status   08/14/2017 2.17 0.40 - 4.00 mU/L Final   10/24/2016 2.80 0.40 - 4.00 mU/L Final   08/13/2007 1.86 0.4 - 5.0 mU/L Final     T4 Free   Date Value Ref Range Status   08/13/2007 1.02 0.70 - 1.85 ng/dL Final       Creatinine   Date Value Ref Range Status   11/06/2017 0.75 0.52 - 1.04 mg/dL Final   08/14/2017 0.66 0.52 - 1.04 mg/dL Final   ]    No results found for: ALBUMIN]    ALT   Date Value Ref Range Status   06/04/2012 12 0 - 50 U/L Final   04/21/2011 15 0 - 50 U/L Final   ]    Recent Labs   Lab Test  08/14/17   0907  10/20/14   1325  08/05/13   1700   CHOL  167  174  189   HDL  66  70  59   LDL  87  89  102   TRIG  74  71  141   CHOLHDLRATIO   --   2.5  3.2       No results found for: B12]    Assessment   Assessment/Plan:      1.  Type 1 DM- Overall, doing better.  Will continue current plan and will consider GLP-1 or SGLT2 in the future.  She will check on insurance coverage.       2.  Risk factors- BP and lipids under good control.  No DM complications.   Creatinine is normal and normal MA, creatinine and TSH.      3.  F/U in 3 months with Dr. Ness.  Call sooner with concerns.      I spent 30 minutes with this patient face to face and explained the conditions and plans (more than 50% of time was counseling/coordination of care, diabetes management, follow up plan for worsening hyper and hypoglycemia) . The patient understood and is satisfied with today's visit.     Melonie Coughlin PA-C, MPAS   [facility]  Department of Medicine  Division of Endocrinology and Diabetes

## 2017-11-06 NOTE — PROGRESS NOTES

## 2017-11-09 DIAGNOSIS — E10.9 WELL CONTROLLED TYPE 1 DIABETES MELLITUS (H): ICD-10-CM

## 2018-04-11 DIAGNOSIS — E10.9 WELL CONTROLLED TYPE 1 DIABETES MELLITUS (H): ICD-10-CM

## 2018-04-23 DIAGNOSIS — E78.00 HIGH CHOLESTEROL: ICD-10-CM

## 2018-04-24 RX ORDER — SIMVASTATIN 40 MG
40 TABLET ORAL AT BEDTIME
Qty: 90 TABLET | Refills: 0 | Status: SHIPPED | OUTPATIENT
Start: 2018-04-24 | End: 2018-07-29

## 2018-05-07 ENCOUNTER — OFFICE VISIT (OUTPATIENT)
Dept: ENDOCRINOLOGY | Facility: CLINIC | Age: 48
End: 2018-05-07
Payer: COMMERCIAL

## 2018-05-07 VITALS
WEIGHT: 183.9 LBS | BODY MASS INDEX: 36.11 KG/M2 | SYSTOLIC BLOOD PRESSURE: 125 MMHG | HEART RATE: 89 BPM | DIASTOLIC BLOOD PRESSURE: 78 MMHG | HEIGHT: 60 IN

## 2018-05-07 DIAGNOSIS — E10.9 TYPE 1 DIABETES MELLITUS WITHOUT COMPLICATION (H): Primary | ICD-10-CM

## 2018-05-07 LAB — HBA1C MFR BLD: 6.9 % (ref 4.3–6)

## 2018-05-07 ASSESSMENT — PAIN SCALES - GENERAL: PAINLEVEL: NO PAIN (0)

## 2018-05-07 NOTE — PROGRESS NOTES
This 47 year old woman is here for f/u of her type 1 diabetes and hyperlipemia. She takes lantus 33 units in the evening (9-10 pm) and novolog 3 units per carb.  She uses a correction of 1 to drop 25 with goal of 120. She forgot her meter at home today so we have no data to review.  When she was here three months ago to see Melonie Coughlin she was having much higher sugars than usual.  They increased the lantus and things improved.  However, she then started to get low.  She reduced the lantus to the current dose and the lows have gone away.  She has occasional lows and highs, but generally is in target.  She thinks she sometimes isn't as good as controlling food portions as she was in the past.   She checks three times a day most days . Overall she thinks things are going pretty well.  She recognizes her lows herself.        She hasn't seen her eye MD this year and will make an appt to see him soon. She has been taking her zocor regularly. at bedtime.  She has no feet concerns.  She is still having menses.She wants to lose weight.  Her brother is getting  in August.   She plans to start walking more.      Current Outpatient Prescriptions on File Prior to Visit:  blood glucose monitoring (ONETOUCH ULTRA) test strip USE TO TEST FOUR TIMES A DAY   HUMALOG KWIKPEN 100 UNIT/ML soln Use as directed up to 80 units per day, Disp-3 Month, R-3, PUJA, E-Prescribe   insulin glargine (LANTUS) 100 UNIT/ML injection Inject 32 Units Subcutaneous At Bedtime (Patient taking differently: Inject 33 Units Subcutaneous At Bedtime )   insulin pen needle (B-D U/F) 31G X 8 MM Use as directed for injections 4 times daily   insulin pen needle 31G X 8 MM Use 4 times daily   Lancets Misc. (ACCU-CHEK SOFTCLIX LANCET DEV) KIT Use as directed   simvastatin (ZOCOR) 40 MG tablet Take 1 tablet (40 mg) by mouth At Bedtime   SOFTCLIX LANCETS MISC Test 4 times daily     No current facility-administered medications on file prior to visit.     ROS: 10  point ROS neg other than the symptoms noted above in the HPI.    So Hx - Works for [employer].  Lives alone.  No cigs.    Vital signs:   /78  Pulse 89  Ht 1.524 m (5')  Wt 83.4 kg (183 lb 14.4 oz)  BMI 35.92 kg/m2  Estimated body mass index is 35.92 kg/(m^2) as calculated from the following:    Height as of this encounter: 1.524 m (5').    Weight as of this encounter: 83.4 kg (183 lb 14.4 oz).    NAD  Eyes - no periorbital edema, conjunctival injection, scleral icterus  CV - RRR.  Normal pulses in feet.  No edema  Neuro - sensation intact to monofilament on soles of feet.  DTR 2/4 biceps  Skin - normal texture   Feet - no ulcers     Recent Labs   Lab Test  11/06/17   1314  11/06/17   1309 11/06/17 08/14/17   0907 08/14/17   10/24/16   0833  10/24/16   0823   10/20/14   1325   08/05/13   1642   A1C   --   7.7*   --    --    --    --    --    --    --    --    --   6.6*   HEMOGLOBINA1   --    --   7.3*   --   7.4*   < >   --    --    < >   --    < >   --    TSH   --    --    --   2.17   --    --    --   2.80   --    --    --    --    LDL   --    --    --   87   --    --    --    --    --   89   < >   --    HDL   --    --    --   66   --    --    --    --    --   70   < >   --    TRIG   --    --    --   74   --    --    --    --    --   71   < >   --    CR   --   0.75   --   0.66   --    --    --   0.74   --   0.66   < >   --    MICROL  14   --    --    --    --    --   6   --    --    --    < >   --     < > = values in this interval not displayed.       A1c today 6.9    Assessment and plan:    1.  Diabetes control.  A1c is at target.  Discussed several options that might help her with post prandial control and weight loss including seeing the dietician, using a CGM, weight watchers, adding GLP1 agonist.  She thinks she will measure her food and work on portion control.  No changes in dosing today.    2.  Diabetes complications. Feet and kidney function are fine.  Needs to see eye MD.    3. CVD risk. BP  is fine.  She is on statin.    F/u with Melonie Coughlin in 3 mo and me in 6 months      I spent 25 minutes with this patient face to face and explained the conditions and plans (more than 50% of time was counseling/coordination of diabetes care with emphasis on new tools to support optimal control) . The patient understood and is satisfied with today's visit.     Henna Ness MD

## 2018-05-07 NOTE — MR AVS SNAPSHOT
After Visit Summary   2018    Coral Painting    MRN: [id number]           Patient Information     Date Of Birth          1970        Visit Information        Provider Department      2018 4:00 PM Henna Ness MD M [facility] Endocrinology        Today's Diagnoses     Type 1 diabetes mellitus without complication (H)    -  1       Follow-ups after your visit        Follow-up notes from your care team     Return for f/u 3 mo with Melonie Cuoghlin and 6 mo with me.      Your next 10 appointments already scheduled     Aug 20, 2018  1:30 PM CDT   (Arrive by 1:15 PM)   RETURN DIABETES with MARLON Lucia [facility] Endocrinology ([facility])    [address] [place] [id number] [phone]            2018  3:30 PM CST   (Arrive by 3:15 PM)   RETURN DIABETES with Henna Ness MD   [facility] Endocrinology ([facility])    [address] [place] [address] [phone]              Who to contact     Please call your clinic at [phone] to:    Ask questions about your health    Make or cancel appointments    Discuss your medicines    Learn about your test results    Speak to your doctor            Additional Information About Your Visit        MyChart Information     [employer] is an electronic gateway that provides easy, online access to your medical records. With [employer], you can request a clinic appointment, read your test results, renew a prescription or communicate with your care team.     To sign up for [employer] visit the website at [email or web address]   You will be asked to enter the access code listed below, as well as some personal information. Please follow the directions to create your username and password.     Your access code is: [email or web address]  Expires: 2018  4:09 PM     Your access code will  in 90 days. If you need help or a new code,  please contact your [facility] or call [phone] for assistance.        Care EveryWhere ID     This is your Care EveryWhere ID. This could be used by other organizations to access your [facility] medical records  [id number]        Your Vitals Were     Pulse Height BMI (Body Mass Index)             89 1.524 m (5') 35.92 kg/m2          Blood Pressure from Last 3 Encounters:   05/07/18 125/78   11/06/17 130/81   08/14/17 130/79    Weight from Last 3 Encounters:   05/07/18 83.4 kg (183 lb 14.4 oz)   11/06/17 81.2 kg (179 lb)   08/14/17 80.1 kg (176 lb 9.6 oz)              Today, you had the following     No orders found for display         Today's Medication Changes          These changes are accurate as of 5/7/18  4:09 PM.  If you have any questions, ask your nurse or doctor.               These medicines have changed or have updated prescriptions.        Dose/Directions    insulin glargine 100 UNIT/ML injection   Commonly known as:  LANTUS   This may have changed:  how much to take   Used for:  Well controlled type 1 diabetes mellitus (H)        Dose:  32 Units   Inject 32 Units Subcutaneous At Bedtime   Quantity:  30 mL   Refills:  3                Primary Care Provider Office Phone # Fax #    Sagrario Glass [facility] [phone]       [address] [facility] [address]        Equal Access to Services     TJ SOTO AH: Hortensia Hernandez, jt lugo, qaaydee brush . So [facility] [phone].    ATENCIÓN: Si habla español, tiene a wagoner disposición servicios gratuitos de asistencia lingüística. Llame al [phone].    We comply with applicable federal civil rights laws and [place] laws. We do not discriminate on the basis of race, color, national origin, age, disability, sex, sexual orientation, or gender identity.            Thank you!     Thank you for choosing [facility] ENDOCRINOLOGY   for your care. Our goal is always to provide you with excellent care. Hearing back from our patients is one way we can continue to improve our services. Please take a few minutes to complete the written survey that you may receive in the mail after your visit with us. Thank you!             Your Updated Medication List - Protect others around you: Learn how to safely use, store and throw away your medicines at www.disposemymeds.org.          This list is accurate as of 5/7/18  4:09 PM.  Always use your most recent med list.                   Brand Name Dispense Instructions for use Diagnosis    blood glucose lancing device     1 kit    Use as directed    Type 1 diabetes mellitus (H)       blood glucose monitoring lancets     400 each    Test 4 times daily    Type 1 diabetes mellitus (H)       blood glucose monitoring test strip    ONETOUCH ULTRA    400 each    USE TO TEST FOUR TIMES A DAY    Well controlled type 1 diabetes mellitus (H)       HumaLOG KWIKpen 100 UNIT/ML injection   Generic drug:  insulin lispro     60 mL    Use as directed up to 80 units per day, Disp-3 Month, R-3, PUJA, E-Prescribe    Well controlled type 1 diabetes mellitus (H)       insulin glargine 100 UNIT/ML injection    LANTUS    30 mL    Inject 32 Units Subcutaneous At Bedtime    Well controlled type 1 diabetes mellitus (H)       * insulin pen needle 31G X 8 MM     100 each    Use 4 times daily    Well controlled type 1 diabetes mellitus (H)       * insulin pen needle 31G X 8 MM    B-D U/F    360 each    Use as directed for injections 4 times daily    Well controlled type 1 diabetes mellitus (H)       simvastatin 40 MG tablet    ZOCOR    90 tablet    Take 1 tablet (40 mg) by mouth At Bedtime    High cholesterol       * Notice:  This list has 2 medication(s) that are the same as other medications prescribed for you. Read the directions carefully, and ask your doctor or other care provider to review them with you.

## 2018-05-07 NOTE — LETTER
5/7/2018       RE: Coral Painting  [address]   [facility] [address]     Dear Colleague,    Thank you for referring your patient, Coral Painting, to the [facility] ENDOCRINOLOGY at [facility]. Please see a copy of my visit note below.    This 47 year old woman is here for f/u of her type 1 diabetes and hyperlipemia. She takes lantus 33 units in the evening (9-10 pm) and novolog 3 units per carb.  She uses a correction of 1 to drop 25 with goal of 120. She forgot her meter at home today so we have no data to review.  When she was here three months ago to see Melonie Coughlin she was having much higher sugars than usual.  They increased the lantus and things improved.  However, she then started to get low.  She reduced the lantus to the current dose and the lows have gone away.  She has occasional lows and highs, but generally is in target.  She thinks she sometimes isn't as good as controlling food portions as she was in the past.   She checks three times a day most days . Overall she thinks things are going pretty well.  She recognizes her lows herself.        She hasn't seen her eye MD this year and will make an appt to see him soon. She has been taking her zocor regularly. at bedtime.  She has no feet concerns.  She is still having menses.She wants to lose weight.  Her brother is getting  in August.   She plans to start walking more.      Current Outpatient Prescriptions on File Prior to Visit:  blood glucose monitoring (ONETOUCH ULTRA) test strip USE TO TEST FOUR TIMES A DAY   HUMALOG KWIKPEN 100 UNIT/ML soln Use as directed up to 80 units per day, Disp-3 Month, R-3, PUJA, E-Prescribe   insulin glargine (LANTUS) 100 UNIT/ML injection Inject 32 Units Subcutaneous At Bedtime (Patient taking differently: Inject 33 Units Subcutaneous At Bedtime )   insulin pen needle (B-D U/F) 31G X 8 MM Use as directed for injections 4 times daily   insulin pen needle 31G X 8 MM  Use 4 times daily   Lancets Misc. (ACCU-CHEK SOFTCLIX LANCET DEV) KIT Use as directed   simvastatin (ZOCOR) 40 MG tablet Take 1 tablet (40 mg) by mouth At Bedtime   SOFTCLIX LANCETS MISC Test 4 times daily     No current facility-administered medications on file prior to visit.     ROS: 10 point ROS neg other than the symptoms noted above in the HPI.    So Hx - Works for [employer].  Lives alone.  No cigs.    Vital signs:   /78  Pulse 89  Ht 1.524 m (5')  Wt 83.4 kg (183 lb 14.4 oz)  BMI 35.92 kg/m2  Estimated body mass index is 35.92 kg/(m^2) as calculated from the following:    Height as of this encounter: 1.524 m (5').    Weight as of this encounter: 83.4 kg (183 lb 14.4 oz).    NAD  Eyes - no periorbital edema, conjunctival injection, scleral icterus  CV - RRR.  Normal pulses in feet.  No edema  Neuro - sensation intact to monofilament on soles of feet.  DTR 2/4 biceps  Skin - normal texture   Feet - no ulcers     Recent Labs   Lab Test  11/06/17   1314  11/06/17   1309 11/06/17 08/14/17   0907 08/14/17   10/24/16   0833  10/24/16   0823   10/20/14   1325   08/05/13   1642   A1C   --   7.7*   --    --    --    --    --    --    --    --    --   6.6*   HEMOGLOBINA1   --    --   7.3*   --   7.4*   < >   --    --    < >   --    < >   --    TSH   --    --    --   2.17   --    --    --   2.80   --    --    --    --    LDL   --    --    --   87   --    --    --    --    --   89   < >   --    HDL   --    --    --   66   --    --    --    --    --   70   < >   --    TRIG   --    --    --   74   --    --    --    --    --   71   < >   --    CR   --   0.75   --   0.66   --    --    --   0.74   --   0.66   < >   --    MICROL  14   --    --    --    --    --   6   --    --    --    < >   --     < > = values in this interval not displayed.       A1c today 6.9    Assessment and plan:    1.  Diabetes control.  A1c is at target.  Discussed several options that might help her with post prandial control and weight  loss including seeing the dietician, using a CGM, weight watchers, adding GLP1 agonist.  She thinks she will measure her food and work on portion control.  No changes in dosing today.    2.  Diabetes complications. Feet and kidney function are fine.  Needs to see eye MD.    3. CVD risk. BP is fine.  She is on statin.    F/u with Melonie Coughlin in 3 mo and me in 6 months      I spent 25 minutes with this patient face to face and explained the conditions and plans (more than 50% of time was counseling/coordination of diabetes care with emphasis on new tools to support optimal control) . The patient understood and is satisfied with today's visit.     Henna Ness MD

## 2018-07-29 DIAGNOSIS — E78.00 HIGH CHOLESTEROL: ICD-10-CM

## 2018-07-31 RX ORDER — SIMVASTATIN 40 MG
40 TABLET ORAL AT BEDTIME
Qty: 30 TABLET | Refills: 0 | Status: SHIPPED | OUTPATIENT
Start: 2018-07-31 | End: 2018-08-20

## 2018-08-20 ENCOUNTER — OFFICE VISIT (OUTPATIENT)
Dept: ENDOCRINOLOGY | Facility: CLINIC | Age: 48
End: 2018-08-20
Payer: COMMERCIAL

## 2018-08-20 VITALS
HEART RATE: 91 BPM | SYSTOLIC BLOOD PRESSURE: 121 MMHG | WEIGHT: 178.3 LBS | DIASTOLIC BLOOD PRESSURE: 77 MMHG | BODY MASS INDEX: 35.01 KG/M2 | HEIGHT: 60 IN

## 2018-08-20 DIAGNOSIS — E10.9 TYPE 1 DIABETES MELLITUS WITHOUT COMPLICATION (H): Primary | ICD-10-CM

## 2018-08-20 DIAGNOSIS — E78.00 HIGH CHOLESTEROL: ICD-10-CM

## 2018-08-20 DIAGNOSIS — E10.9 WELL CONTROLLED TYPE 1 DIABETES MELLITUS (H): ICD-10-CM

## 2018-08-20 LAB — HBA1C MFR BLD: 7.2 % (ref 4.3–6)

## 2018-08-20 RX ORDER — INSULIN LISPRO 100 [IU]/ML
INJECTION, SOLUTION INTRAVENOUS; SUBCUTANEOUS
Qty: 60 ML | Refills: 3 | Status: SHIPPED | OUTPATIENT
Start: 2018-08-20 | End: 2020-03-18

## 2018-08-20 RX ORDER — SIMVASTATIN 40 MG
40 TABLET ORAL AT BEDTIME
Qty: 90 TABLET | Refills: 3 | Status: SHIPPED | OUTPATIENT
Start: 2018-08-20 | End: 2019-09-28

## 2018-08-20 ASSESSMENT — PAIN SCALES - GENERAL: PAINLEVEL: NO PAIN (0)

## 2018-08-20 NOTE — PATIENT INSTRUCTIONS
Schedule eye exam.      Consider increasing Lantus to 23 units (or 24)    Brenda sensor- disposable 10 day sensor.     Dexcom- 7 day sensor.  G6 sensor- no calibration.     Look into them and let me know if you would like to try it.

## 2018-08-20 NOTE — PROGRESS NOTES
HPI:  Ms. Painting is a 46 yo woman here for f/u of type 1 diabetes.  Blood sugars have been much better lately and she is back to her old dose of Lantus of 22 units.  She feels she has been running a bit higher up into the lower 200's.  She has had a few lows in the 60's.  She forgot her meter today.   Humalog 3 units/15g CHO with meals plus correction of 1/25 over 175 mg/dL.  She had a recent normal eye exam.  She continues on simvastatin.  She has no other concerns today.        ROS   GENERAL: gained a few pounds since last visit. no fevers, chills, malaise, night sweats.   HEENT: no dysphagia, diplopia, neck pain or tenderness, dry/scratchy eyes, URI, cough, sinus drainage, tinnitus, sinus pressure  CV: no chest pain, pressure, palpitations, skipped beats, LOC  LUNGS: no SOB, MCGOVERN, cough, sputum production, wheezing   ABDOMEN: no diarrhea, constipation, abdominal pain  EXTREMITIES: no rashes, ulcers, edema.  Right shoulder limitation in ROM- seeing physical therapy.  NEUROLOGY: no changes in vision, tingling or numbness in hands or feet.   MSK: no muscle aches or pains, weakness    Personal Hx   Is a  at the [employer].  Lives alone in a condo.  Drinks socially once a week or so.    Past Medical History  Dyslipidemia  Type I Diabetes Mellitus in her late 20's. Initially diagnosed as type 2, then found to have antibody positivity.    Physical Exam   /77  Pulse 91  Ht 1.524 m (5')  Wt 80.9 kg (178 lb 4.8 oz)  BMI 34.82 kg/m2  GENERAL: Alert and oriented X3, NAD, well dressed, answering questions appropriately, appears stated age.  EXTREMITIES: no edema, +pulses, no rashes, no lesions    RESULTS  Lab Results   Component Value Date    A1C 7.7 (H) 11/06/2017    A1C 6.6 (A) 08/05/2013    A1C 7.5 (A) 02/04/2013    A1C 7.3 (A) 08/01/2012    A1C 7.2 (A) 01/25/2012    HEMOGLOBINA1 7.2 (A) 08/20/2018    HEMOGLOBINA1 6.9 (A) 05/07/2018    HEMOGLOBINA1 7.3 (A) 11/06/2017    HEMOGLOBINA1 7.4 (A)  08/14/2017    HEMOGLOBINA1 7.1 (A) 03/14/2017       TSH   Date Value Ref Range Status   08/14/2017 2.17 0.40 - 4.00 mU/L Final   10/24/2016 2.80 0.40 - 4.00 mU/L Final   08/13/2007 1.86 0.4 - 5.0 mU/L Final     T4 Free   Date Value Ref Range Status   08/13/2007 1.02 0.70 - 1.85 ng/dL Final       ALT   Date Value Ref Range Status   06/04/2012 12 0 - 50 U/L Final   04/21/2011 15 0 - 50 U/L Final   ]    Recent Labs   Lab Test  08/14/17   0907  10/20/14   1325  08/05/13   1700   CHOL  167  174  189   HDL  66  70  59   LDL  87  89  102   TRIG  74  71  141   CHOLHDLRATIO   --   2.5  3.2       Lab Results   Component Value Date     08/14/2017      Lab Results   Component Value Date    POTASSIUM 4.0 08/14/2017     Lab Results   Component Value Date    CHLORIDE 105 08/14/2017     Lab Results   Component Value Date    MICHELLE 8.5 08/14/2017     Lab Results   Component Value Date    CO2 24 08/14/2017     Lab Results   Component Value Date    BUN 10 08/14/2017     Lab Results   Component Value Date    CR 0.75 11/06/2017       GFR Estimate   Date Value Ref Range Status   11/06/2017 83 >60 mL/min/1.7m2 Final     Comment:     Non  GFR Calc   08/14/2017 >90  Non  GFR Calc   >60 mL/min/1.7m2 Final   10/24/2016 83 >60 mL/min/1.7m2 Final     Comment:     Non  GFR Calc     GFR Estimate If Black   Date Value Ref Range Status   11/06/2017 >90 >60 mL/min/1.7m2 Final     Comment:      GFR Calc   08/14/2017 >90   GFR Calc   >60 mL/min/1.7m2 Final   10/24/2016 >90   GFR Calc   >60 mL/min/1.7m2 Final       Lab Results   Component Value Date    MICROL 14 11/06/2017     No results found for: MICROALBUMIN  No results found for: CPEPT, GADAB, ISCAB    No results found for: B12]    Most recent eye exam date: : Not Found     Assessment   Assessment/Plan:      1.  Type 1 DM- Overall, doing better.  Will continue current plan.  She is not interested in  starting any new medications today (GLP-1 or SGLT2).   Discussed sensors (she does not test much in the day) to help her improve her glucose control.  She might consider a trial      2.  Risk factors- BP and lipids under good control.  No DM complications.   Creatinine is normal and normal MA, creatinine and TSH.      3.  F/U in 3 months with Dr. Ness.  Call sooner with concerns.      I spent 30 minutes with this patient face to face and explained the conditions and plans (more than 50% of time was counseling/coordination of care, diabetes management, follow up plan for worsening hyper and hypoglycemia) . The patient understood and is satisfied with today's visit.     Melonie Coughlin PA-C, MPAS   [facility]  Department of Medicine  Division of Endocrinology and Diabetes

## 2018-08-20 NOTE — MR AVS SNAPSHOT
After Visit Summary   8/20/2018    Coral Painting    MRN: [id number]           Patient Information     Date Of Birth          1970        Visit Information        Provider Department      8/20/2018 1:30 PM Melonie Coughlin PA-C [facility]        Today's Diagnoses     Type 1 diabetes mellitus without complication (H)    -  1    High cholesterol        Well controlled type 1 diabetes mellitus (H)          Care Instructions    Schedule eye exam.      Consider increasing Lantus to 23 units (or 24)    Brenda sensor- disposable 10 day sensor.     Dexcom- 7 day sensor.  G6 sensor- no calibration.     Look into them and let me know if you would like to try it.                   Follow-ups after your visit        Additional Services     DIABETES EDUCATOR REFERRAL       Dexcom trial                  Follow-up notes from your care team     Return in about 3 months (around 11/20/2018).      Your next 10 appointments already scheduled     Nov 13, 2018  3:30 PM CST   (Arrive by 3:15 PM)   RETURN DIABETES with MD DAVE [facility] ([facility])    [address] [place] [id number] [phone]              Future tests that were ordered for you today     Open Future Orders        Priority Expected Expires Ordered    Creatinine Routine  8/20/2019 8/20/2018    Lipid Profile Routine  8/20/2019 8/20/2018            Who to contact     Please call your clinic at [phone] to:    Ask questions about your health    Make or cancel appointments    Discuss your medicines    Learn about your test results    Speak to your doctor            Additional Information About Your Visit        MyChart Information     locr is an electronic gateway that provides easy, online access to your medical records. With locr, you can request a clinic appointment, read your test results, renew a prescription or communicate with your care  team.     To sign up for Cellfire[employer] visit the website at [email or web address]   You will be asked to enter the access code listed below, as well as some personal information. Please follow the directions to create your username and password.     Your access code is: [email or web address]  Expires: 2018  6:30 AM     Your access code will  in 90 days. If you need help or a new code, please contact your [facility] or call [phone] for assistance.        Care EveryWhere ID     This is your Care EveryWhere ID. This could be used by other organizations to access your [place] medical records  [id number]        Your Vitals Were     Pulse Height BMI (Body Mass Index)             91 1.524 m (5') 34.82 kg/m2          Blood Pressure from Last 3 Encounters:   18 121/77   18 125/78   17 130/81    Weight from Last 3 Encounters:   18 80.9 kg (178 lb 4.8 oz)   18 83.4 kg (183 lb 14.4 oz)   17 81.2 kg (179 lb)              We Performed the Following     Albumin Random Urine Quantitative with Creat Ratio     DIABETES EDUCATOR REFERRAL     Hemoglobin A1c POCT          Today's Medication Changes          These changes are accurate as of 18  2:17 PM.  If you have any questions, ask your nurse or doctor.               These medicines have changed or have updated prescriptions.        Dose/Directions    insulin glargine 100 UNIT/ML injection   Commonly known as:  LANTUS   This may have changed:    - how much to take  - how to take this  - when to take this  - additional instructions   Used for:  Well controlled type 1 diabetes mellitus (H)   Changed by:  Melonie Coughlin PA-C        Use as directed up to 35 units daily   Quantity:  30 mL   Refills:  3            Where to get your medicines      These medications were sent to [facility] - [place], [place] - [address] [place] [address]     Phone:   [phone]     blood glucose monitoring test strip    HumaLOG KWIKpen 100 UNIT/ML injection    insulin glargine 100 UNIT/ML injection    insulin pen needle 31G X 8 MM    simvastatin 40 MG tablet                Primary Care Provider Office Phone # Fax #    Sagrario Glass [facility] [phone] [address] [facility] [address]        Equal Access to Services     TJ SOTO : Hadii aad ku hadasho Soomaali, waaxda luqadaha, qaybta kaalmada adeegyada, waxay idiin hayaan adeeg kharash la'aan ah. So [facility] [phone].    ATENCIÓN: Si habla español, tiene a wagoner disposición servicios gratuitos de asistencia lingüística. Llame al [phone].    We comply with applicable federal civil rights laws and [place] laws. We do not discriminate on the basis of race, color, national origin, age, disability, sex, sexual orientation, or gender identity.            Thank you!     Thank you for choosing [facility]  for your care. Our goal is always to provide you with excellent care. Hearing back from our patients is one way we can continue to improve our services. Please take a few minutes to complete the written survey that you may receive in the mail after your visit with us. Thank you!             Your Updated Medication List - Protect others around you: Learn how to safely use, store and throw away your medicines at www.disposemymeds.org.          This list is accurate as of 8/20/18  2:17 PM.  Always use your most recent med list.                   Brand Name Dispense Instructions for use Diagnosis    blood glucose lancing device     1 kit    Use as directed    Type 1 diabetes mellitus (H)       blood glucose monitoring lancets     400 each    Test 4 times daily    Type 1 diabetes mellitus (H)       blood glucose monitoring test strip    ONETOUCH ULTRA    400 each    USE TO TEST FOUR TIMES A DAY    Well controlled type 1 diabetes mellitus (H)       HumaLOG KWIKpen 100 UNIT/ML  injection   Generic drug:  insulin lispro     60 mL    Use as directed up to 80 units per day, Disp-3 Month, R-3, PUJA, E-Prescribe    Well controlled type 1 diabetes mellitus (H)       insulin glargine 100 UNIT/ML injection    LANTUS    30 mL    Use as directed up to 35 units daily    Well controlled type 1 diabetes mellitus (H)       * insulin pen needle 31G X 8 MM    B-D U/F    360 each    Use as directed for injections 4 times daily    Well controlled type 1 diabetes mellitus (H)       * insulin pen needle 31G X 8 MM     100 each    Use 4 times daily    Well controlled type 1 diabetes mellitus (H)       simvastatin 40 MG tablet    ZOCOR    90 tablet    Take 1 tablet (40 mg) by mouth At Bedtime    High cholesterol       * Notice:  This list has 2 medication(s) that are the same as other medications prescribed for you. Read the directions carefully, and ask your doctor or other care provider to review them with you.

## 2018-08-20 NOTE — LETTER
8/20/2018       RE: Coral Painting  [address]  [facility] [address]     Dear Colleague,    Thank you for referring your patient, Coral Painting, to the [facility] ENDOCRINOLOGY at [facility]. Please see a copy of my visit note below.    HPI:  Ms. Painting is a 46 yo woman here for f/u of type 1 diabetes.  Blood sugars have been much better lately and she is back to her old dose of Lantus of 22 units.  She feels she has been running a bit higher up into the lower 200's.  She has had a few lows in the 60's.  She forgot her meter today.   Humalog 3 units/15g CHO with meals plus correction of 1/25 over 175 mg/dL.  She had a recent normal eye exam.  She continues on simvastatin.  She has no other concerns today.        ROS   GENERAL: gained a few pounds since last visit. no fevers, chills, malaise, night sweats.   HEENT: no dysphagia, diplopia, neck pain or tenderness, dry/scratchy eyes, URI, cough, sinus drainage, tinnitus, sinus pressure  CV: no chest pain, pressure, palpitations, skipped beats, LOC  LUNGS: no SOB, MCGOVERN, cough, sputum production, wheezing   ABDOMEN: no diarrhea, constipation, abdominal pain  EXTREMITIES: no rashes, ulcers, edema.  Right shoulder limitation in ROM- seeing physical therapy.  NEUROLOGY: no changes in vision, tingling or numbness in hands or feet.   MSK: no muscle aches or pains, weakness    Personal Hx   Is a  at the [employer].  Lives alone in a condo.  Drinks socially once a week or so.    Past Medical History  Dyslipidemia  Type I Diabetes Mellitus in her late 20's. Initially diagnosed as type 2, then found to have antibody positivity.    Physical Exam   /77  Pulse 91  Ht 1.524 m (5')  Wt 80.9 kg (178 lb 4.8 oz)  BMI 34.82 kg/m2  GENERAL: Alert and oriented X3, NAD, well dressed, answering questions appropriately, appears stated age.  EXTREMITIES: no edema, +pulses, no rashes, no lesions    RESULTS  Lab  Results   Component Value Date    A1C 7.7 (H) 11/06/2017    A1C 6.6 (A) 08/05/2013    A1C 7.5 (A) 02/04/2013    A1C 7.3 (A) 08/01/2012    A1C 7.2 (A) 01/25/2012    HEMOGLOBINA1 7.2 (A) 08/20/2018    HEMOGLOBINA1 6.9 (A) 05/07/2018    HEMOGLOBINA1 7.3 (A) 11/06/2017    HEMOGLOBINA1 7.4 (A) 08/14/2017    HEMOGLOBINA1 7.1 (A) 03/14/2017       TSH   Date Value Ref Range Status   08/14/2017 2.17 0.40 - 4.00 mU/L Final   10/24/2016 2.80 0.40 - 4.00 mU/L Final   08/13/2007 1.86 0.4 - 5.0 mU/L Final     T4 Free   Date Value Ref Range Status   08/13/2007 1.02 0.70 - 1.85 ng/dL Final       ALT   Date Value Ref Range Status   06/04/2012 12 0 - 50 U/L Final   04/21/2011 15 0 - 50 U/L Final   ]    Recent Labs   Lab Test  08/14/17   0907  10/20/14   1325  08/05/13   1700   CHOL  167  174  189   HDL  66  70  59   LDL  87  89  102   TRIG  74  71  141   CHOLHDLRATIO   --   2.5  3.2       Lab Results   Component Value Date     08/14/2017      Lab Results   Component Value Date    POTASSIUM 4.0 08/14/2017     Lab Results   Component Value Date    CHLORIDE 105 08/14/2017     Lab Results   Component Value Date    MICHELLE 8.5 08/14/2017     Lab Results   Component Value Date    CO2 24 08/14/2017     Lab Results   Component Value Date    BUN 10 08/14/2017     Lab Results   Component Value Date    CR 0.75 11/06/2017       GFR Estimate   Date Value Ref Range Status   11/06/2017 83 >60 mL/min/1.7m2 Final     Comment:     Non  GFR Calc   08/14/2017 >90  Non  GFR Calc   >60 mL/min/1.7m2 Final   10/24/2016 83 >60 mL/min/1.7m2 Final     Comment:     Non  GFR Calc     GFR Estimate If Black   Date Value Ref Range Status   11/06/2017 >90 >60 mL/min/1.7m2 Final     Comment:      GFR Calc   08/14/2017 >90   GFR Calc   >60 mL/min/1.7m2 Final   10/24/2016 >90   GFR Calc   >60 mL/min/1.7m2 Final       Lab Results   Component Value Date    MICROL 14  11/06/2017     No results found for: MICROALBUMIN  No results found for: CPEPT, GADAB, ISCAB    No results found for: B12]    Most recent eye exam date: : Not Found     Assessment   Assessment/Plan:      1.  Type 1 DM- Overall, doing better.  Will continue current plan.  She is not interested in starting any new medications today (GLP-1 or SGLT2).   Discussed sensors (she does not test much in the day) to help her improve her glucose control.  She might consider a trial      2.  Risk factors- BP and lipids under good control.  No DM complications.   Creatinine is normal and normal MA, creatinine and TSH.      3.  F/U in 3 months with Dr. Ness.  Call sooner with concerns.      I spent 30 minutes with this patient face to face and explained the conditions and plans (more than 50% of time was counseling/coordination of care, diabetes management, follow up plan for worsening hyper and hypoglycemia) . The patient understood and is satisfied with today's visit.     Sincerely,    Melonie Couglhin PA-C

## 2018-10-18 DIAGNOSIS — E10.9 TYPE 1 DIABETES MELLITUS WITHOUT COMPLICATION (H): ICD-10-CM

## 2018-10-18 LAB
CHOLEST SERPL-MCNC: 136 MG/DL
CREAT SERPL-MCNC: 0.72 MG/DL (ref 0.52–1.04)
CREAT UR-MCNC: 229 MG/DL
GFR SERPL CREATININE-BSD FRML MDRD: 87 ML/MIN/1.7M2
HDLC SERPL-MCNC: 61 MG/DL
LDLC SERPL CALC-MCNC: 63 MG/DL
MICROALBUMIN UR-MCNC: 16 MG/L
MICROALBUMIN/CREAT UR: 6.99 MG/G CR (ref 0–25)
NONHDLC SERPL-MCNC: 75 MG/DL
TRIGL SERPL-MCNC: 58 MG/DL

## 2018-10-18 PROCEDURE — 36415 COLL VENOUS BLD VENIPUNCTURE: CPT | Performed by: PHYSICIAN ASSISTANT

## 2018-10-18 PROCEDURE — 82565 ASSAY OF CREATININE: CPT | Performed by: PHYSICIAN ASSISTANT

## 2018-10-18 PROCEDURE — 82043 UR ALBUMIN QUANTITATIVE: CPT | Performed by: PHYSICIAN ASSISTANT

## 2018-10-18 PROCEDURE — 80061 LIPID PANEL: CPT | Performed by: PHYSICIAN ASSISTANT

## 2018-11-12 ENCOUNTER — PATIENT OUTREACH (OUTPATIENT)
Dept: CARE COORDINATION | Facility: CLINIC | Age: 48
End: 2018-11-12

## 2018-11-13 ENCOUNTER — OFFICE VISIT (OUTPATIENT)
Dept: ENDOCRINOLOGY | Facility: CLINIC | Age: 48
End: 2018-11-13
Payer: COMMERCIAL

## 2018-11-13 VITALS
SYSTOLIC BLOOD PRESSURE: 124 MMHG | DIASTOLIC BLOOD PRESSURE: 74 MMHG | BODY MASS INDEX: 33.7 KG/M2 | HEIGHT: 61 IN | WEIGHT: 178.5 LBS | HEART RATE: 90 BPM

## 2018-11-13 DIAGNOSIS — E10.9 TYPE 1 DIABETES MELLITUS WITHOUT COMPLICATION (H): Primary | ICD-10-CM

## 2018-11-13 ASSESSMENT — PAIN SCALES - GENERAL: PAINLEVEL: NO PAIN (0)

## 2018-11-13 NOTE — PROGRESS NOTES
"This 48 year old woman is here for f/u of her type 1 diabetes and hyperlipemia. She takes lantus 22 units in the evening (9-10 pm) and novolog 3 units per carb.  She uses a correction of 1 to drop 25 with goal of 120.  She checks her blood sugars 3-4 times each day.  Over the last month her average was 172 with a range from .  She is usually in the mid 100s when she gets up in the morning.  She ranges from  in the hours after dinner.  Overall she thinks things are going pretty well.  She recognizes her lows herself usually in the 60s.    She has not seen the eye doctor in more than a year.  Her previous physician closed his practice and she knows the records were transferred somewhere.  She plans to make an appointment with them.  She has been taking her zocor regularly at bedtime.  She has no feet concerns.  She is still having menses.she talked about using the kalee sensor with Melonie Coughlin last visit.  She looked it up online and thought it looked too big.      Current Outpatient Prescriptions on File Prior to Visit:  blood glucose monitoring (ONETOUCH ULTRA) test strip USE TO TEST FOUR TIMES A DAY   HUMALOG KWIKPEN 100 UNIT/ML soln Use as directed up to 80 units per day, Disp-3 Month, R-3, PUJA, E-Prescribe   insulin glargine (LANTUS SOLOSTAR) 100 UNIT/ML pen Use as directed up to 35 units daily   insulin pen needle (B-D U/F) 31G X 8 MM Use as directed for injections 4 times daily   insulin pen needle 31G X 8 MM Use 4 times daily   Lancets Misc. (ACCU-CHEK SOFTCLIX LANCET DEV) KIT Use as directed   simvastatin (ZOCOR) 40 MG tablet Take 1 tablet (40 mg) by mouth At Bedtime   SOFTCLIX LANCETS MISC Test 4 times daily     No current facility-administered medications on file prior to visit.     ROS: 10 point ROS neg other than the symptoms noted above in the HPI.    So Hx - lives alone    Vital signs:   /74  Pulse 90  Ht 1.542 m (5' 0.71\")  Wt 81 kg (178 lb 8 oz)  BMI 34.05 kg/m2  Estimated body " "mass index is 34.05 kg/(m^2) as calculated from the following:    Height as of this encounter: 1.542 m (5' 0.71\").    Weight as of this encounter: 81 kg (178 lb 8 oz).    VSS  NAD  Eyes - no periorbital edema, conjunctival injection, scleral icterus  Neck - no thyromegaly  Skin - normal texture   Mood - cheerful    Recent Labs   Lab Test  10/18/18   0946  10/18/18   0938 08/20/18 05/07/18 11/06/17   1314  11/06/17   1309   08/14/17   0907   10/24/16   0823   08/05/13   1642   A1C   --    --    --    --    --   7.7*   --    --    --    --    --   6.6*   HEMOGLOBINA1   --    --   7.2*  6.9*   --    --    < >   --    < >   --    < >   --    TSH   --    --    --    --    --    --    --   2.17   --   2.80   --    --    LDL   --   63   --    --    --    --    --   87   --    --    < >   --    HDL   --   61   --    --    --    --    --   66   --    --    < >   --    TRIG   --   58   --    --    --    --    --   74   --    --    < >   --    CR   --   0.72   --    --    --   0.75   --   0.66   --   0.74   < >   --    MICROL  16   --    --    --   14   --    --    --    < >   --    < >   --     < > = values in this interval not displayed.       Assessment and plan:    1.  Diabetes control.  Overall she is doing very well.  I think she would have more stability in her basal glucose control if she switched from Lantus to tresiba or Toujeo.  We talked about the long duration of this basal insulin and how it didn't need to be taken at the same time each day.  She will let me know when she is about to run out of her Lantus and I will send a prescription in for the new insulin.  We also reviewed the kalee sensor and I explained how small it was.  I recommended she look at the videos online.  She might consider using one next year.      2.  Diabetes complications.  She needs to see the eye doctor.  I recommended I refer her to our clinic but she wants to go elsewhere.  Her renal function is normal.  She has no foot " concerns.    3.CVD risk.  Her blood pressure is well controlled.  She is on a statin and LDL less than 70.    F/u with Melonie Coughlin in 3 mo and me in 6 months      I spent 25 minutes with this patient face to face and explained the conditions and plans (more than 50% of time was counseling/coordination of diabetes care) . The patient understood and is satisfied with today's visit.     Henna Ness MD

## 2018-11-13 NOTE — LETTER
11/13/2018       RE: Coral Painting  [address]  [facility] [address]     Dear Colleague,    Thank you for referring your patient, Coral Painting, to the [facility] ENDOCR[facility] at [facility]. Please see a copy of my visit note below.    This 48 year old woman is here for f/u of her type 1 diabetes and hyperlipemia. She takes lantus 22 units in the evening (9-10 pm) and novolog 3 units per carb.  She uses a correction of 1 to drop 25 with goal of 120.  She checks her blood sugars 3-4 times each day.  Over the last month her average was 172 with a range from .  She is usually in the mid 100s when she gets up in the morning.  She ranges from  in the hours after dinner.  Overall she thinks things are going pretty well.  She recognizes her lows herself usually in the 60s.    She has not seen the eye doctor in more than a year.  Her previous physician closed his practice and she knows the records were transferred somewhere.  She plans to make an appointment with them.  She has been taking her zocor regularly at bedtime.  She has no feet concerns.  She is still having menses.she talked about using the kalee sensor with Melonie Coughlin last visit.  She looked it up online and thought it looked too big.      Current Outpatient Prescriptions on File Prior to Visit:  blood glucose monitoring (ONETOUCH ULTRA) test strip USE TO TEST FOUR TIMES A DAY   HUMALOG KWIKPEN 100 UNIT/ML soln Use as directed up to 80 units per day, Disp-3 Month, R-3, PUJA, E-Prescribe   insulin glargine (LANTUS SOLOSTAR) 100 UNIT/ML pen Use as directed up to 35 units daily   insulin pen needle (B-D U/F) 31G X 8 MM Use as directed for injections 4 times daily   insulin pen needle 31G X 8 MM Use 4 times daily   Lancets Misc. (ACCU-CHEK SOFTCLIX LANCET DEV) KIT Use as directed   simvastatin (ZOCOR) 40 MG tablet Take 1 tablet (40 mg) by mouth At Bedtime   SOFTCLIX LANCETS MISC Test 4 times daily  "    No current facility-administered medications on file prior to visit.     ROS: 10 point ROS neg other than the symptoms noted above in the HPI.    So Hx - lives alone    Vital signs:   /74  Pulse 90  Ht 1.542 m (5' 0.71\")  Wt 81 kg (178 lb 8 oz)  BMI 34.05 kg/m2  Estimated body mass index is 34.05 kg/(m^2) as calculated from the following:    Height as of this encounter: 1.542 m (5' 0.71\").    Weight as of this encounter: 81 kg (178 lb 8 oz).    VSS  NAD  Eyes - no periorbital edema, conjunctival injection, scleral icterus  Neck - no thyromegaly  Skin - normal texture   Mood - cheerful    Recent Labs   Lab Test  10/18/18   0946  10/18/18   0938 08/20/18 05/07/18 11/06/17   1314  11/06/17   1309   08/14/17   0907   10/24/16   0823   08/05/13   1642   A1C   --    --    --    --    --   7.7*   --    --    --    --    --   6.6*   HEMOGLOBINA1   --    --   7.2*  6.9*   --    --    < >   --    < >   --    < >   --    TSH   --    --    --    --    --    --    --   2.17   --   2.80   --    --    LDL   --   63   --    --    --    --    --   87   --    --    < >   --    HDL   --   61   --    --    --    --    --   66   --    --    < >   --    TRIG   --   58   --    --    --    --    --   74   --    --    < >   --    CR   --   0.72   --    --    --   0.75   --   0.66   --   0.74   < >   --    MICROL  16   --    --    --   14   --    --    --    < >   --    < >   --     < > = values in this interval not displayed.       Assessment and plan:    1.  Diabetes control.  Overall she is doing very well.  I think she would have more stability in her basal glucose control if she switched from Lantus to tresiba or Toujeo.  We talked about the long duration of this basal insulin and how it didn't need to be taken at the same time each day.  She will let me know when she is about to run out of her Lantus and I will send a prescription in for the new insulin.  We also reviewed the kalee sensor and I explained how small it " was.  I recommended she look at the videos online.  She might consider using one next year.      2.  Diabetes complications.  She needs to see the eye doctor.  I recommended I refer her to our clinic but she wants to go elsewhere.  Her renal function is normal.  She has no foot concerns.    3.CVD risk.  Her blood pressure is well controlled.  She is on a statin and LDL less than 70.    F/u with Melonie Coughlin in 3 mo and me in 6 months      I spent 25 minutes with this patient face to face and explained the conditions and plans (more than 50% of time was counseling/coordination of diabetes care) . The patient understood and is satisfied with today's visit.     Henna Ness MD

## 2018-11-13 NOTE — MR AVS SNAPSHOT
"              After Visit Summary   11/13/2018    Coral Painting    MRN: [id number]           Patient Information     Date Of Birth          1970        Visit Information        Provider Department      11/13/2018 3:30 PM Henna Ness MD [facility]        Care Instructions    Call clinic when you need to renew your lantus so I can send a prescription for the new long acting insulin to your pharmacy.    Look at the Freestyle kalee sensor on line.          Follow-ups after your visit        Follow-up notes from your care team     Return for f/u 3 mo with Melonie Coughlin and 6 mo with me.      Your next 10 appointments already scheduled     Feb 18, 2019  7:30 AM CST   (Arrive by 7:15 AM)   RETURN DIABETES with MARLON Lucia [facility] ([facility])    [address] [place] [id number] [phone]            May 21, 2019  3:30 PM CDT   (Arrive by 3:15 PM)   RETURN DIABETES with Henna Ness MD   [facility] ([facility])    [address] [place] [id number] [phone]              Who to contact     Please call your clinic at [phone] to:    Ask questions about your health    Make or cancel appointments    Discuss your medicines    Learn about your test results    Speak to your doctor            Additional Information About Your Visit        Care EveryWhere ID     This is your Care EveryWhere ID. This could be used by other organizations to access your [place] medical records  [id number]        Your Vitals Were     Pulse Height BMI (Body Mass Index)             90 1.542 m (5' 0.71\") 34.05 kg/m2          Blood Pressure from Last 3 Encounters:   11/13/18 124/74   08/20/18 121/77   05/07/18 125/78    Weight from Last 3 Encounters:   11/13/18 81 kg (178 lb 8 oz)   08/20/18 80.9 kg (178 lb 4.8 oz)   05/07/18 83.4 kg (183 lb 14.4 oz)            "   Today, you had the following     No orders found for display       Primary Care Provider Office Phone # Fax #    Sagrario Glass [facility] [phone]       [address] [facility] [address]        Equal Access to Services     TJ SOTO : Hadii aad ku hadмаринаo Soomaali, waaxda luqadaha, qaybta kaalmada adebeatrizyada, aydee murphyin hayaafrancia machadobeatriz thomas eva singh. So [facility] [phone].    ATENCIÓN: Si habla español, tiene a wagoner disposición servicios gratuitos de asistencia lingüística. Llame al [phone].    We comply with applicable federal civil rights laws and [place] laws. We do not discriminate on the basis of race, color, national origin, age, disability, sex, sexual orientation, or gender identity.            Thank you!     Thank you for choosing [facility] ENDOCRINOLOGY  for your care. Our goal is always to provide you with excellent care. Hearing back from our patients is one way we can continue to improve our services. Please take a few minutes to complete the written survey that you may receive in the mail after your visit with us. Thank you!             Your Updated Medication List - Protect others around you: Learn how to safely use, store and throw away your medicines at www.disposemymeds.org.          This list is accurate as of 11/13/18  4:13 PM.  Always use your most recent med list.                   Brand Name Dispense Instructions for use Diagnosis    blood glucose lancing device     1 kit    Use as directed    Type 1 diabetes mellitus (H)       blood glucose monitoring lancets     400 each    Test 4 times daily    Type 1 diabetes mellitus (H)       blood glucose monitoring test strip    ONETOUCH ULTRA    400 each    USE TO TEST FOUR TIMES A DAY    Well controlled type 1 diabetes mellitus (H)       HumaLOG KWIKpen 100 UNIT/ML injection   Generic drug:  insulin lispro     60 mL    Use as directed up to 80 units per day, Disp-3 Month, R-3, PUJA, E-Prescribe    Well  controlled type 1 diabetes mellitus (H)       insulin glargine 100 UNIT/ML injection    LANTUS    30 mL    Use as directed up to 35 units daily    Well controlled type 1 diabetes mellitus (H)       * insulin pen needle 31G X 8 MM    B-D U/F    360 each    Use as directed for injections 4 times daily    Well controlled type 1 diabetes mellitus (H)       * insulin pen needle 31G X 8 MM     100 each    Use 4 times daily    Well controlled type 1 diabetes mellitus (H)       simvastatin 40 MG tablet    ZOCOR    90 tablet    Take 1 tablet (40 mg) by mouth At Bedtime    High cholesterol       * Notice:  This list has 2 medication(s) that are the same as other medications prescribed for you. Read the directions carefully, and ask your doctor or other care provider to review them with you.

## 2018-11-13 NOTE — PATIENT INSTRUCTIONS
Call clinic when you need to renew your lantus so I can send a prescription for the new long acting insulin to your pharmacy.    Look at the Freestyle kalee sensor on line.

## 2019-02-27 ENCOUNTER — TELEPHONE (OUTPATIENT)
Dept: ENDOCRINOLOGY | Facility: CLINIC | Age: 49
End: 2019-02-27

## 2019-02-27 NOTE — TELEPHONE ENCOUNTER
Forms received from: [facility]    Forms were for insulin treated diabetes mellitus     Faxed Date:3/4/19        9

## 2019-04-25 ENCOUNTER — TELEPHONE (OUTPATIENT)
Dept: ENDOCRINOLOGY | Facility: CLINIC | Age: 49
End: 2019-04-25

## 2019-04-25 DIAGNOSIS — E10.9 WELL CONTROLLED TYPE 1 DIABETES MELLITUS (H): ICD-10-CM

## 2019-04-25 NOTE — TELEPHONE ENCOUNTER
[facility] Call Center    Phone Message    May a detailed message be left on voicemail: yes    Reason for Call: Medication Question or concern regarding medication   Prescription Clarification  Name of Medication: insulin pen needle 31G X 8 MM  Prescribing Provider: galdino   Pharmacy: [facility] - [address]   What on the order needs clarification? [facility] and got a email on 04/17 that they were waiting for doctors approval. I don't see refill request for this rx. Please f/u/     Action Taken: Message routed to:  [facility] ([facility] endo med refills

## 2019-06-03 ENCOUNTER — OFFICE VISIT (OUTPATIENT)
Dept: ENDOCRINOLOGY | Facility: CLINIC | Age: 49
End: 2019-06-03
Payer: COMMERCIAL

## 2019-06-03 VITALS
WEIGHT: 180.6 LBS | HEART RATE: 89 BPM | SYSTOLIC BLOOD PRESSURE: 136 MMHG | BODY MASS INDEX: 34.45 KG/M2 | DIASTOLIC BLOOD PRESSURE: 80 MMHG

## 2019-06-03 DIAGNOSIS — E10.9 TYPE 1 DIABETES MELLITUS WITHOUT COMPLICATION (H): Primary | ICD-10-CM

## 2019-06-03 LAB — HBA1C MFR BLD: 6.6 % (ref 4.3–6)

## 2019-06-03 ASSESSMENT — PAIN SCALES - GENERAL: PAINLEVEL: NO PAIN (0)

## 2019-06-03 NOTE — LETTER
6/3/2019       RE: Coral Painting  [address]  [facility] [address]     Dear Colleague,    Thank you for referring your patient, Coral Painting, to the [facility] ENDOCRINOLOG[facility] at [facility]. Please see a copy of my visit note below.    This 48 year old woman is here for f/u of her type 1 diabetes and hyperlipemia. She takes lantus 22 units in the evening (9-10 pm) and novolog 3 units per carb.  She uses a correction of 1 to drop 25 with goal of 120.  She checks her blood sugars 3-4 times each day but forgot to bring in her meter today.  She is quite concerned about the high a.m. sugars she has had in the past several weeks.  These values are usually between 150 and sometimes as high as 300.  She is at target before her other meals and reports that she actually has hypoglycemia after dinner.  The hypoglycemia usually occlude occurs 2 to 4 hours after her nighttime bolus.  She usually goes to bed 2 to 3 hours after eating dinner.  She will check her glucose at bedtime and if she is on the low side, she will eat without taking a bolus.  On several occasions she would have symptomatic hypoglycemia that she treated before bed and then she would eat additional food at bedtime to be sure she was not low overnight..  She recognizes her lows herself usually in the 60s.    She has not seen the eye doctor in more than a year.  Her previous physician closed his practice and she knows the records were transferred somewhere.  She plans to make an appointment with them.  She has been taking her zocor regularly at bedtime.  She has no feet concerns.  She is still having menses.she is talked about using a continuous glucose monitor in the past but is concerned about the size of all of the devices she has seen.  She does not really want the device visible to others.    Current Outpatient Medications on File Prior to Visit:  blood glucose monitoring (ONETOUCH ULTRA) test strip USE  "TO TEST FOUR TIMES A DAY   HUMALOG KWIKPEN 100 UNIT/ML soln Use as directed up to 80 units per day, Disp-3 Month, R-3, PUJA, E-Prescribe   insulin glargine (LANTUS SOLOSTAR) 100 UNIT/ML pen Use as directed up to 35 units daily   insulin pen needle (B-D U/F) 31G X 8 MM miscellaneous Use as directed for injections 4 times daily   insulin pen needle 31G X 8 MM Use 4 times daily   Lancets Misc. (ACCU-CHEK SOFTCLIX LANCET DEV) KIT Use as directed   simvastatin (ZOCOR) 40 MG tablet Take 1 tablet (40 mg) by mouth At Bedtime   SOFTCLIX LANCETS MISC Test 4 times daily     No current facility-administered medications on file prior to visit.     ROS: 10 point ROS neg other than the symptoms noted above in the HPI.    Vital signs:   /80   Pulse 89   Wt 81.9 kg (180 lb 9.6 oz)   BMI 34.45 kg/m     Estimated body mass index is 34.45 kg/m  as calculated from the following:    Height as of 11/13/18: 1.542 m (5' 0.71\").    Weight as of this encounter: 81.9 kg (180 lb 9.6 oz).  NAD  Eyes - no periorbital edema, conjunctival injection, scleral icterus  CV - RRR.  Normal pulses in feet.  No edema  Neuro - sensation intact to monofilament on soles of feet.  DTR 2/4 biceps  Skin - normal texture   Feet - no ulcers     Recent Labs   Lab Test 06/03/19 10/18/18  0946 10/18/18  0938 08/20/18 11/06/17  1314 11/06/17  1309  08/14/17  0907  10/24/16  0823  08/05/13  1642   A1C  --   --   --   --   --   --  7.7*  --   --   --   --   --  6.6*   HEMOGLOBINA1 6.6*  --   --  7.2*   < >  --   --    < >  --    < >  --    < >  --    TSH  --   --   --   --   --   --   --   --  2.17  --  2.80  --   --    LDL  --   --  63  --   --   --   --   --  87  --   --    < >  --    HDL  --   --  61  --   --   --   --   --  66  --   --    < >  --    TRIG  --   --  58  --   --   --   --   --  74  --   --    < >  --    CR  --   --  0.72  --   --   --  0.75  --  0.66  --  0.74   < >  --    MICROL  --  16  --   --   --  14  --   --   --    < >  --    < >  --  "    < > = values in this interval not displayed.       Assessment and plan:    1.  Diabetes control.  Her A1c is at target.  She is quite surprised about this given her morning hyperglycemia.  I believe the morning hyperglycemia is because she is eating extra calories at night, possibly because her carb ratio at dinner is too aggressive.  Timing of the hypoglycemia suggest that it is related to that bolus.  I asked her to reduce the bolus at dinner to 2 units per 15 g.  She will continue to check her blood sugars and let me know how she is doing in a couple of weeks.  We talked about using a diabetes humera to help her with her dosage calculation but she is really not interested in using an humera on her phone to do so.  We also talked about continuous glucose monitors, including the new implantable one.  She will think about it and is not sure she ever wants one.      2.  Diabetes complications.  She needs to see her eye doctor.  Her renal function is normal.  Her foot exam is normal.    3.CVD risk.  Her blood pressure is well controlled.  Her LDL is at target on a statin.    F/u with Melonie Coughlin in 3 mo and me in 6 months    Henna Ness MD

## 2019-06-03 NOTE — PATIENT INSTRUCTIONS
Change carb ratio at dinner to 2 units for every 15 grams of carb.    Call  In 1-2 weeks  if the sugars are not at target.  [phone].  You can leave a message with the values and I will get back to you    [employer] is the name of the implantable sensor

## 2019-06-04 NOTE — PROGRESS NOTES
This 48 year old woman is here for f/u of her type 1 diabetes and hyperlipemia. She takes lantus 22 units in the evening (9-10 pm) and novolog 3 units per carb.  She uses a correction of 1 to drop 25 with goal of 120.  She checks her blood sugars 3-4 times each day but forgot to bring in her meter today.  She is quite concerned about the high a.m. sugars she has had in the past several weeks.  These values are usually between 150 and sometimes as high as 300.  She is at target before her other meals and reports that she actually has hypoglycemia after dinner.  The hypoglycemia usually occlude occurs 2 to 4 hours after her nighttime bolus.  She usually goes to bed 2 to 3 hours after eating dinner.  She will check her glucose at bedtime and if she is on the low side, she will eat without taking a bolus.  On several occasions she would have symptomatic hypoglycemia that she treated before bed and then she would eat additional food at bedtime to be sure she was not low overnight..  She recognizes her lows herself usually in the 60s.    She has not seen the eye doctor in more than a year.  Her previous physician closed his practice and she knows the records were transferred somewhere.  She plans to make an appointment with them.  She has been taking her zocor regularly at bedtime.  She has no feet concerns.  She is still having menses.she is talked about using a continuous glucose monitor in the past but is concerned about the size of all of the devices she has seen.  She does not really want the device visible to others.    Current Outpatient Medications on File Prior to Visit:  blood glucose monitoring (ONETOUCH ULTRA) test strip USE TO TEST FOUR TIMES A DAY   HUMALOG KWIKPEN 100 UNIT/ML soln Use as directed up to 80 units per day, Disp-3 Month, R-3, PUJA, E-Prescribe   insulin glargine (LANTUS SOLOSTAR) 100 UNIT/ML pen Use as directed up to 35 units daily   insulin pen needle (B-D U/F) 31G X 8 MM miscellaneous Use as  "directed for injections 4 times daily   insulin pen needle 31G X 8 MM Use 4 times daily   Lancets Misc. (ACCU-CHEK SOFTCLIX LANCET DEV) KIT Use as directed   simvastatin (ZOCOR) 40 MG tablet Take 1 tablet (40 mg) by mouth At Bedtime   SOFTCLIX LANCETS MISC Test 4 times daily     No current facility-administered medications on file prior to visit.     ROS: 10 point ROS neg other than the symptoms noted above in the HPI.    Vital signs:   /80   Pulse 89   Wt 81.9 kg (180 lb 9.6 oz)   BMI 34.45 kg/m    Estimated body mass index is 34.45 kg/m  as calculated from the following:    Height as of 11/13/18: 1.542 m (5' 0.71\").    Weight as of this encounter: 81.9 kg (180 lb 9.6 oz).  NAD  Eyes - no periorbital edema, conjunctival injection, scleral icterus  CV - RRR.  Normal pulses in feet.  No edema  Neuro - sensation intact to monofilament on soles of feet.  DTR 2/4 biceps  Skin - normal texture   Feet - no ulcers     Recent Labs   Lab Test 06/03/19 10/18/18  0946 10/18/18  0938 08/20/18 11/06/17  1314 11/06/17  1309  08/14/17  0907  10/24/16  0823  08/05/13  1642   A1C  --   --   --   --   --   --  7.7*  --   --   --   --   --  6.6*   HEMOGLOBINA1 6.6*  --   --  7.2*   < >  --   --    < >  --    < >  --    < >  --    TSH  --   --   --   --   --   --   --   --  2.17  --  2.80  --   --    LDL  --   --  63  --   --   --   --   --  87  --   --    < >  --    HDL  --   --  61  --   --   --   --   --  66  --   --    < >  --    TRIG  --   --  58  --   --   --   --   --  74  --   --    < >  --    CR  --   --  0.72  --   --   --  0.75  --  0.66  --  0.74   < >  --    MICROL  --  16  --   --   --  14  --   --   --    < >  --    < >  --     < > = values in this interval not displayed.       Assessment and plan:    1.  Diabetes control.  Her A1c is at target.  She is quite surprised about this given her morning hyperglycemia.  I believe the morning hyperglycemia is because she is eating extra calories at night, possibly " because her carb ratio at dinner is too aggressive.  Timing of the hypoglycemia suggest that it is related to that bolus.  I asked her to reduce the bolus at dinner to 2 units per 15 g.  She will continue to check her blood sugars and let me know how she is doing in a couple of weeks.  We talked about using a diabetes humera to help her with her dosage calculation but she is really not interested in using an humera on her phone to do so.  We also talked about continuous glucose monitors, including the new implantable one.  She will think about it and is not sure she ever wants one.      2.  Diabetes complications.  She needs to see her eye doctor.  Her renal function is normal.  Her foot exam is normal.    3.CVD risk.  Her blood pressure is well controlled.  Her LDL is at target on a statin.    F/u with Melonie Coughlin in 3 mo and me in 6 months    Henna Ness MD

## 2019-09-16 ENCOUNTER — OFFICE VISIT (OUTPATIENT)
Dept: ENDOCRINOLOGY | Facility: CLINIC | Age: 49
End: 2019-09-16
Payer: COMMERCIAL

## 2019-09-16 VITALS
HEART RATE: 88 BPM | BODY MASS INDEX: 34.25 KG/M2 | DIASTOLIC BLOOD PRESSURE: 78 MMHG | HEIGHT: 61 IN | WEIGHT: 181.4 LBS | SYSTOLIC BLOOD PRESSURE: 123 MMHG

## 2019-09-16 DIAGNOSIS — E10.9 TYPE 1 DIABETES MELLITUS WITHOUT COMPLICATION (H): ICD-10-CM

## 2019-09-16 DIAGNOSIS — E10.9 TYPE 1 DIABETES MELLITUS WITHOUT COMPLICATION (H): Primary | ICD-10-CM

## 2019-09-16 LAB
ALT SERPL W P-5'-P-CCNC: 15 U/L (ref 0–50)
CHOLEST SERPL-MCNC: 164 MG/DL
CREAT SERPL-MCNC: 0.68 MG/DL (ref 0.52–1.04)
CREAT UR-MCNC: 121 MG/DL
GFR SERPL CREATININE-BSD FRML MDRD: >90 ML/MIN/{1.73_M2}
HBA1C MFR BLD: 7.1 % (ref 4.3–6)
HDLC SERPL-MCNC: 69 MG/DL
LDLC SERPL CALC-MCNC: 80 MG/DL
MICROALBUMIN UR-MCNC: 8 MG/L
MICROALBUMIN/CREAT UR: 6.26 MG/G CR (ref 0–25)
NONHDLC SERPL-MCNC: 95 MG/DL
TRIGL SERPL-MCNC: 74 MG/DL
TSH SERPL DL<=0.005 MIU/L-ACNC: 2.29 MU/L (ref 0.4–4)

## 2019-09-16 ASSESSMENT — PAIN SCALES - GENERAL: PAINLEVEL: NO PAIN (0)

## 2019-09-16 ASSESSMENT — MIFFLIN-ST. JEOR: SCORE: 1380.59

## 2019-09-16 NOTE — NURSING NOTE
Chief Complaint   Patient presents with     RECHECK     Type 1 Diabetes     Capillary puncture performed for Hemoglobin A1C test. Patient tolerated well.    Argelia Kaplan MA

## 2019-09-16 NOTE — PROGRESS NOTES
"HPI:  Ms. Painting is a 47 yo woman here for f/u of type 1 diabetes. She also sees Dr. Ness.  Blood sugars have been under good control lately and she has been having very little hypoglycemia.  She is currently taking Lantus 20-21 units.  She did not bring her meter today.   She takes Humalog 3 units/15g CHO with meals plus correction of 1/25 over 175 mg/dL.  At her last visit, she discussed sensors more with Dr. Ness.  She is not interested in an implantable sensor, but she feels like she would like to try the Dexcom G6 in the new year.  She likes the idea of not having to calibrate the sensor and being able to wear it in a discreet location (unlike the kalee on her arm).  She would like to wait now because she is not ready for changes while she is more busy.  She had a recent normal eye exam.  She continues on simvastatin.  She has known several friends and non-related family members having heart trouble in their 50's and she is starting to worry about this.  She has no other concerns today.        ROS   GENERAL: weight is stable.  No fevers, chills, malaise, night sweats.   HEENT: no dysphagia, diplopia, neck pain or tenderness, dry/scratchy eyes, URI, cough, sinus drainage, tinnitus, sinus pressure  CV: no chest pain, pressure, palpitations, skipped beats, LOC  LUNGS: no SOB, MCGOVERN, cough, sputum production, wheezing   ABDOMEN: no diarrhea, constipation, abdominal pain  EXTREMITIES: no rashes, ulcers, edema.  Right shoulder limitation in ROM- seeing physical therapy.  NEUROLOGY: no changes in vision, tingling or numbness in hands or feet.   MSK: no muscle aches or pains, weakness    Personal Hx   Is a  at the [employer].  Lives alone in a condo.  Drinks socially once a week or so.    Past Medical History  Dyslipidemia  Type I Diabetes Mellitus in her late 20's. Initially diagnosed as type 2, then found to have antibody positivity.    Physical Exam   Ht 1.542 m (5' 0.71\")   Wt 82.3 kg " (181 lb 6.4 oz)   BMI 34.60 kg/m    GENERAL: Alert and oriented X3, NAD, well dressed, answering questions appropriately, appears stated age.  EXTREMITIES: no edema, +pulses, no rashes, no lesions    RESULTS  Lab Results   Component Value Date    A1C 7.7 (H) 11/06/2017    A1C 6.6 (A) 08/05/2013    A1C 7.5 (A) 02/04/2013    A1C 7.3 (A) 08/01/2012    A1C 7.2 (A) 01/25/2012    HEMOGLOBINA1 6.6 (A) 06/03/2019    HEMOGLOBINA1 7.2 (A) 08/20/2018    HEMOGLOBINA1 6.9 (A) 05/07/2018    HEMOGLOBINA1 7.3 (A) 11/06/2017    HEMOGLOBINA1 7.4 (A) 08/14/2017       TSH   Date Value Ref Range Status   08/14/2017 2.17 0.40 - 4.00 mU/L Final   10/24/2016 2.80 0.40 - 4.00 mU/L Final   08/13/2007 1.86 0.4 - 5.0 mU/L Final     T4 Free   Date Value Ref Range Status   08/13/2007 1.02 0.70 - 1.85 ng/dL Final       ALT   Date Value Ref Range Status   06/04/2012 12 0 - 50 U/L Final   04/21/2011 15 0 - 50 U/L Final   ]    Recent Labs   Lab Test 10/18/18  0938 08/14/17  0907 10/20/14  1325 08/05/13  1700   CHOL 136 167 174 189   HDL 61 66 70 59   LDL 63 87 89 102   TRIG 58 74 71 141   CHOLHDLRATIO  --   --  2.5 3.2       Lab Results   Component Value Date     08/14/2017      Lab Results   Component Value Date    POTASSIUM 4.0 08/14/2017     Lab Results   Component Value Date    CHLORIDE 105 08/14/2017     Lab Results   Component Value Date    MICHELLE 8.5 08/14/2017     Lab Results   Component Value Date    CO2 24 08/14/2017     Lab Results   Component Value Date    BUN 10 08/14/2017     Lab Results   Component Value Date    CR 0.72 10/18/2018       GFR Estimate   Date Value Ref Range Status   10/18/2018 87 >60 mL/min/1.7m2 Final     Comment:     Non  GFR Calc   11/06/2017 83 >60 mL/min/1.7m2 Final     Comment:     Non  GFR Calc   08/14/2017 >90  Non  GFR Calc   >60 mL/min/1.7m2 Final     GFR Estimate If Black   Date Value Ref Range Status   10/18/2018 >90 >60 mL/min/1.7m2 Final     Comment:       GFR Calc   11/06/2017 >90 >60 mL/min/1.7m2 Final     Comment:      GFR Calc   08/14/2017 >90   GFR Calc   >60 mL/min/1.7m2 Final       Lab Results   Component Value Date    MICROL 16 10/18/2018     No results found for: MICROALBUMIN  No results found for: CPEPT, GADAB, ISCAB    No results found for: B12]    Most recent eye exam date: : Not Found     Assessment   Assessment/Plan:      1.  Type 1 DM- Overall, doing well.  A1c is 7.1% today.  Will continue current plan. She is interested in ordering the Dexcom G6 in the new year.  I also offered her the opportunity to wear a trial sensor of the G5 for 1 week.  She will consider.  No changes today.      2.  Risk factors- BP and lipids under good control.  No DM complications.   Creatinine is normal and normal MA, creatinine and TSH.  Will check labs today.  Obesity- Her BMI is 34.  Spent much of our discussion on lifestyle changes, reducing sedentary time and increasing physical activity.  We discussed heart healthy eating ideas and encouraged her to increase consumption of fruits, vegetables and whole grains.      3.  F/U in 3 months with Dr. Ness.  Call sooner with concerns.      I spent 30 minutes with this patient face to face and explained the conditions and plans (more than 50% of time was counseling/coordination of care, diabetes management, follow up plan for worsening hyper and hypoglycemia) . The patient understood and is satisfied with today's visit.     Melonie Coughlin PA-C, MPAS   [facility]  Department of Medicine  Division of Endocrinology and Diabetes

## 2019-09-16 NOTE — PATIENT INSTRUCTIONS
Schedule eye exam     Let us know if you would like to schedule a trial dexcom     Dexcom G6- no calibrations    Work on breaking up sedentary time every hour during the workday.                                            Heart Healthy Diet and Lifestyle    - Eat your veggies and fruits -- and switch to whole grains. An abundance and variety of plant foods should make up the majority of your meals. Strive for seven to 10 servings a day of veggies and fruits. Add in beans and lentils.  Switch to whole-grain bread and cereal, and begin to eat more whole-grain rice and pasta products.    - Go nuts. Keep almonds, cashews, pistachios and walnuts on hand for a quick snack. Choose natural peanut butter or almond butter, rather than the kind with hydrogenated fat added. Try hummus as a dip or spread for bread.    - Pass on the butter. Try olive or canola oil as a healthy replacement for butter or margarine. Use it in cooking. Dip bread in flavored olive oil or lightly spread it on whole-grain bread for a tasty alternative to butter.     - Spice it up. Herbs and spices make food tasty and are also rich in health-promoting substances. Season your meals with herbs and spices rather than salt.    - Go fish. Eat fish twice a week. Fresh or water-packed tuna, salmon, trout, mackerel and herring are healthy choices. Grilled fish tastes good and requires little cleanup. Avoid fried fish, unless it's sauteed in a small amount of canola oil.    - Rein in the red meat. Substitute fish and poultry for red meat. When eaten, make sure it's lean and keep portions small (about the size of a deck of cards). Try to limit sausage, del castillo and other high-fat or processed meats.    - Avoid being sedentary.  Decrease the amount of time spent in daily sedentary behavior.  Prolonged sitting should be interrupted every 30 min for blood glucose benefits.     - Be active.  30 minutes of moderate-to-vigorous intensity aerobic exercise at least 5 days a  week or a total of 150 minutes spread over 3 days per week.  2-3 sessions/week of resistance exercise on nonconsecutive days. Flexibility training and balance training 2-3 times/week for older adults with diabetes.     Make an appointment with a dietitian for a personalized meal plan/nutrition review.

## 2019-09-16 NOTE — LETTER
9/16/2019       RE: Coral Painting  [address]  [facility] [address]     Dear Colleague,    Thank you for referring your patient, Coarl Painting, to the [facility] ENDOCRINOLOGY at [facility]. Please see a copy of my visit note below.    HPI:  Ms. Painting is a 47 yo woman here for f/u of type 1 diabetes. She also sees Dr. Ness.  Blood sugars have been under good control lately and she has been having very little hypoglycemia.  She is currently taking Lantus 20-21 units.  She did not bring her meter today.   She takes Humalog 3 units/15g CHO with meals plus correction of 1/25 over 175 mg/dL.  At her last visit, she discussed sensors more with Dr. Ness.  She is not interested in an implantable sensor, but she feels like she would like to try the Dexcom G6 in the new year.  She likes the idea of not having to calibrate the sensor and being able to wear it in a discreet location (unlike the kalee on her arm).  She would like to wait now because she is not ready for changes while she is more busy.  She had a recent normal eye exam.  She continues on simvastatin.  She has known several friends and non-related family members having heart trouble in their 50's and she is starting to worry about this.  She has no other concerns today.        ROS   GENERAL: weight is stable.  No fevers, chills, malaise, night sweats.   HEENT: no dysphagia, diplopia, neck pain or tenderness, dry/scratchy eyes, URI, cough, sinus drainage, tinnitus, sinus pressure  CV: no chest pain, pressure, palpitations, skipped beats, LOC  LUNGS: no SOB, MCGOVERN, cough, sputum production, wheezing   ABDOMEN: no diarrhea, constipation, abdominal pain  EXTREMITIES: no rashes, ulcers, edema.  Right shoulder limitation in ROM- seeing physical therapy.  NEUROLOGY: no changes in vision, tingling or numbness in hands or feet.   MSK: no muscle aches or pains, weakness    Personal Hx   Is a  at  "the [employer].  Lives alone in a condo.  Drinks socially once a week or so.    Past Medical History  Dyslipidemia  Type I Diabetes Mellitus in her late 20's. Initially diagnosed as type 2, then found to have antibody positivity.    Physical Exam   Ht 1.542 m (5' 0.71\")   Wt 82.3 kg (181 lb 6.4 oz)   BMI 34.60 kg/m     GENERAL: Alert and oriented X3, NAD, well dressed, answering questions appropriately, appears stated age.  EXTREMITIES: no edema, +pulses, no rashes, no lesions    RESULTS  Lab Results   Component Value Date    A1C 7.7 (H) 11/06/2017    A1C 6.6 (A) 08/05/2013    A1C 7.5 (A) 02/04/2013    A1C 7.3 (A) 08/01/2012    A1C 7.2 (A) 01/25/2012    HEMOGLOBINA1 6.6 (A) 06/03/2019    HEMOGLOBINA1 7.2 (A) 08/20/2018    HEMOGLOBINA1 6.9 (A) 05/07/2018    HEMOGLOBINA1 7.3 (A) 11/06/2017    HEMOGLOBINA1 7.4 (A) 08/14/2017       TSH   Date Value Ref Range Status   08/14/2017 2.17 0.40 - 4.00 mU/L Final   10/24/2016 2.80 0.40 - 4.00 mU/L Final   08/13/2007 1.86 0.4 - 5.0 mU/L Final     T4 Free   Date Value Ref Range Status   08/13/2007 1.02 0.70 - 1.85 ng/dL Final       ALT   Date Value Ref Range Status   06/04/2012 12 0 - 50 U/L Final   04/21/2011 15 0 - 50 U/L Final   ]    Recent Labs   Lab Test 10/18/18  0938 08/14/17  0907 10/20/14  1325 08/05/13  1700   CHOL 136 167 174 189   HDL 61 66 70 59   LDL 63 87 89 102   TRIG 58 74 71 141   CHOLHDLRATIO  --   --  2.5 3.2       Lab Results   Component Value Date     08/14/2017      Lab Results   Component Value Date    POTASSIUM 4.0 08/14/2017     Lab Results   Component Value Date    CHLORIDE 105 08/14/2017     Lab Results   Component Value Date    MICHELLE 8.5 08/14/2017     Lab Results   Component Value Date    CO2 24 08/14/2017     Lab Results   Component Value Date    BUN 10 08/14/2017     Lab Results   Component Value Date    CR 0.72 10/18/2018       GFR Estimate   Date Value Ref Range Status   10/18/2018 87 >60 mL/min/1.7m2 Final     Comment:     Non  " American GFR Calc   11/06/2017 83 >60 mL/min/1.7m2 Final     Comment:     Non  GFR Calc   08/14/2017 >90  Non  GFR Calc   >60 mL/min/1.7m2 Final     GFR Estimate If Black   Date Value Ref Range Status   10/18/2018 >90 >60 mL/min/1.7m2 Final     Comment:      GFR Calc   11/06/2017 >90 >60 mL/min/1.7m2 Final     Comment:      GFR Calc   08/14/2017 >90   GFR Calc   >60 mL/min/1.7m2 Final       Lab Results   Component Value Date    MICROL 16 10/18/2018     No results found for: MICROALBUMIN  No results found for: CPEPT, GADAB, ISCAB    No results found for: B12]    Most recent eye exam date: : Not Found     Assessment   Assessment/Plan:      1.  Type 1 DM- Overall, doing well.  A1c is 7.1% today.  Will continue current plan. She is interested in ordering the Dexcom G6 in the new year.  I also offered her the opportunity to wear a trial sensor of the G5 for 1 week.  She will consider.  No changes today.      2.  Risk factors- BP and lipids under good control.  No DM complications.   Creatinine is normal and normal MA, creatinine and TSH.  Will check labs today.  Obesity- Her BMI is 34.  Spent much of our discussion on lifestyle changes, reducing sedentary time and increasing physical activity.  We discussed heart healthy eating ideas and encouraged her to increase consumption of fruits, vegetables and whole grains.      3.  F/U in 3 months with Dr. Ness.  Call sooner with concerns.      I spent 30 minutes with this patient face to face and explained the conditions and plans (more than 50% of time was counseling/coordination of care, diabetes management, follow up plan for worsening hyper and hypoglycemia) . The patient understood and is satisfied with today's visit.     Melonie Coughlin PA-C, MPAS   [facility]  Department of Medicine  Division of Endocrinology and Diabetes

## 2019-09-28 DIAGNOSIS — E78.00 HIGH CHOLESTEROL: Primary | ICD-10-CM

## 2019-10-01 RX ORDER — SIMVASTATIN 40 MG
40 TABLET ORAL AT BEDTIME
Qty: 90 TABLET | Refills: 3 | Status: SHIPPED | OUTPATIENT
Start: 2019-10-01 | End: 2020-08-10

## 2019-11-19 DIAGNOSIS — E10.9 WELL CONTROLLED TYPE 1 DIABETES MELLITUS (H): ICD-10-CM

## 2019-11-19 NOTE — TELEPHONE ENCOUNTER
insulin glargine (LANTUS SOLOSTAR) 100 UNIT/ML pen      Last Written Prescription Date:  8-20-18  Last Fill Quantity: 30 ml,   # refills: 3  Last Office Visit : 9-16-19  Future Office visit:  1-    Routing refill request to provider for review/approval because:  Insulin - refilled per clinic        Kathleen M Doege RN

## 2020-01-09 ENCOUNTER — TELEPHONE (OUTPATIENT)
Dept: ENDOCRINOLOGY | Facility: CLINIC | Age: 50
End: 2020-01-09

## 2020-01-09 DIAGNOSIS — E10.9 WELL CONTROLLED TYPE 1 DIABETES MELLITUS (H): ICD-10-CM

## 2020-01-09 NOTE — TELEPHONE ENCOUNTER
Spoke w/ Pt: states [facility] has faxed order for test strips 01/03/2020 and she still did not have order. Rx order sent per Pt request 01/09/2020.  Zina Gaston RN on 1/9/2020 at 11:11 AM

## 2020-01-21 ENCOUNTER — OFFICE VISIT (OUTPATIENT)
Dept: ENDOCRINOLOGY | Facility: CLINIC | Age: 50
End: 2020-01-21
Payer: COMMERCIAL

## 2020-01-21 VITALS
HEART RATE: 93 BPM | WEIGHT: 177.5 LBS | SYSTOLIC BLOOD PRESSURE: 138 MMHG | BODY MASS INDEX: 33.86 KG/M2 | DIASTOLIC BLOOD PRESSURE: 75 MMHG

## 2020-01-21 DIAGNOSIS — E10.9 TYPE 1 DIABETES MELLITUS WITHOUT COMPLICATION (H): Primary | ICD-10-CM

## 2020-01-21 LAB — HBA1C MFR BLD: 6.3 % (ref 4.3–6)

## 2020-01-21 ASSESSMENT — PAIN SCALES - GENERAL: PAINLEVEL: NO PAIN (0)

## 2020-01-21 NOTE — PROGRESS NOTES
"Coral Painting is a 48 yo here to f/u of type 1 diabetes. She has a history of hyperlipemia.     Currently for her diabetes, she takes Lantus 21 units . She also uses Humalog 3 units/15g CHO with meals plus correction of 1/25 over 175 mg/dL. She has noticed that the Humalog seems to last longer, and will therefore adjust her correction based on her BG at the time.     She does not have her meter with her today, but she checks >3x/day. She has noticed an increased number of checks since switching from Novalog to Humalog. She has noticed a \"handful of lows\" per week, which she can identify at 60. She has noticed that she used to be aware of her lows in the 70s previously, but that number has slowly decreased over the years. She has sometimes woken up low and has had to treat with a juice box. She has not needed help with her lows.     She is interested in the Dexcom G6 in the new year but needs to verify coverage with insurance. She has an upcoming appointment with optho (she has not been seen by optho for several years). She has no other concerns today.     ROS   Gen: Chronic headaches (5-10/mo, tx with Tylenol). No change in skin, hair, energy level, temp intolerance.   Eyes:  No diplopia, redness.  ENT: No dysphagia, dysphonia. Post-nasal drip from a cold she is recovering from.  CV: No SOB, MCGOVERN, tachycardia, palpitations, LE edema.  Resp: No MCGOVERN, SOB, hemoptysis.  GI: No abd pain, n/v, diarrhea  : No hesitancy, frequency, dysuria.   Neuro: No numbness, tingling, loss of sensation.    Current Outpatient Medications   Medication     blood glucose (ONETOUCH ULTRA) test strip     HUMALOG KWIKPEN 100 UNIT/ML soln     insulin glargine (LANTUS SOLOSTAR) 100 UNIT/ML pen     insulin pen needle (B-D U/F) 31G X 8 MM miscellaneous     insulin pen needle 31G X 8 MM     Lancets Misc. (ACCU-CHEK SOFTCLIX LANCET DEV) KIT     simvastatin (ZOCOR) 40 MG tablet     SOFTCLIX LANCETS MISC     No current facility-administered " "medications for this visit.      Physical Exam  Vital signs:      BP: 138/75 Pulse: 93             Weight: 80.5 kg (177 lb 8 oz)  Estimated body mass index is 33.86 kg/m  as calculated from the following:    Height as of 9/16/19: 1.542 m (5' 0.71\").    Weight as of this encounter: 80.5 kg (177 lb 8 oz).    Gen: NAD, VSS  Eyes: EOM grossly intact. No periorbital edema, conjunctival injection. Anicteric sclera.  Neck: Supple, no thyromegaly.  Resp: CTAB  CV: RRR, no m/r/g.   Neuro: DTR 2/4 biceps bilaterally.  Feet: Intact to monofilament bilaterally. No edema. No lesions. DP, PT pulses 3+ bilaterally.     Creatinine   Date Value Ref Range Status   09/16/2019 0.68 0.52 - 1.04 mg/dL Final     GFR Estimate   Date Value Ref Range Status   09/16/2019 >90 >60 mL/min/[1.73_m2] Final     Comment:     Non  GFR Calc  Starting 12/18/2018, serum creatinine based estimated GFR (eGFR) will be   calculated using the Chronic Kidney Disease Epidemiology Collaboration   (CKD-EPI) equation.       GFR Estimate If Black   Date Value Ref Range Status   09/16/2019 >90 >60 mL/min/[1.73_m2] Final     Comment:      GFR Calc  Starting 12/18/2018, serum creatinine based estimated GFR (eGFR) will be   calculated using the Chronic Kidney Disease Epidemiology Collaboration   (CKD-EPI) equation.       Lab Results   Component Value Date    MICROL 8 09/16/2019     LDL Cholesterol Calculated   Date Value Ref Range Status   09/16/2019 80 <100 mg/dL Final     Comment:     Desirable:       <100 mg/dl     HDL Cholesterol   Date Value Ref Range Status   09/16/2019 69 >49 mg/dL Final     Cholesterol   Date Value Ref Range Status   09/16/2019 164 <200 mg/dL Final     Triglycerides   Date Value Ref Range Status   09/16/2019 74 <150 mg/dL Final     Lab Results   Component Value Date    A1C 7.7 (H) 11/06/2017    A1C 6.6 (A) 08/05/2013    A1C 7.5 (A) 02/04/2013    A1C 7.3 (A) 08/01/2012    A1C 7.2 (A) 01/25/2012    HEMOGLOBINA1 " 7.1 (A) 09/16/2019    HEMOGLOBINA1 6.6 (A) 06/03/2019    HEMOGLOBINA1 7.2 (A) 08/20/2018    HEMOGLOBINA1 6.9 (A) 05/07/2018    HEMOGLOBINA1 7.3 (A) 11/06/2017     A1c today was 6.3.    Assessment and Plan:    #Type 1 DM: Coral has well-controlled diabetes. A1c today was 6.3% (previously 7.1%). She has several lows per week and is sometimes waking up from lows. She is interested in the Dexcom G6, which may help limit the number/duration of lows she is experiencing. She will check with insurance and reach out if she has trouble finding coverage for the G6. No other changes today.      #DM risk factors:  -- Retinopathy: Upcoming visit with optho. She has not been seen in several years.  -- Neuropathy: Feet ok today, and she has no parasthesias.  --Nephropathy:Urine microlabumin negative 09/2019. Creat and gfr wnl 09/2019.    #CVD risk factors: BP today 138/75. LDL 80, HDL 69, chol 164 in 09/2019. She takes  simvastatin     RTC 3 months to see Melonie Coughlin, 6 months to see Dr. Grover Mata, MS3

## 2020-01-21 NOTE — LETTER
"1/21/2020       RE: Coral Painting  [address]  [facility] [address]     Dear Colleague,    Thank you for referring your patient, Coral Painting, to the [facility] ENDOCRINOLOGY at [facility]. Please see a copy of my visit note below.    Coral Painting is a 50 yo here to f/u of type 1 diabetes. She has a history of hyperlipemia.     Currently for her diabetes, she takes Lantus 21 units . She also uses Humalog 3 units/15g CHO with meals plus correction of 1/25 over 175 mg/dL. She has noticed that the Humalog seems to last longer, and will therefore adjust her correction based on her BG at the time.     She does not have her meter with her today, but she checks >3x/day. She has noticed an increased number of checks since switching from Novalog to Humalog. She has noticed a \"handful of lows\" per week, which she can identify at 60. She has noticed that she used to be aware of her lows in the 70s previously, but that number has slowly decreased over the years. She has sometimes woken up low and has had to treat with a juice box. She has not needed help with her lows.     She is interested in the Dexcom G6 in the new year but needs to verify coverage with insurance. She has an upcoming appointment with optho (she has not been seen by optho for several years). She has no other concerns today.     ROS   Gen: Chronic headaches (5-10/mo, tx with Tylenol). No change in skin, hair, energy level, temp intolerance.   Eyes:  No diplopia, redness.  ENT: No dysphagia, dysphonia. Post-nasal drip from a cold she is recovering from.  CV: No SOB, MCGOVERN, tachycardia, palpitations, LE edema.  Resp: No MCGOVERN, SOB, hemoptysis.  GI: No abd pain, n/v, diarrhea  : No hesitancy, frequency, dysuria.   Neuro: No numbness, tingling, loss of sensation.    Current Outpatient Medications   Medication     blood glucose (ONETOUCH ULTRA) test strip     HUMALOG KWIKPEN 100 UNIT/ML soln     insulin " "glargine (LANTUS SOLOSTAR) 100 UNIT/ML pen     insulin pen needle (B-D U/F) 31G X 8 MM miscellaneous     insulin pen needle 31G X 8 MM     Lancets Misc. (ACCU-CHEK SOFTCLIX LANCET DEV) KIT     simvastatin (ZOCOR) 40 MG tablet     SOFTCLIX LANCETS MISC     No current facility-administered medications for this visit.      Physical Exam  Vital signs:      BP: 138/75 Pulse: 93             Weight: 80.5 kg (177 lb 8 oz)  Estimated body mass index is 33.86 kg/m  as calculated from the following:    Height as of 9/16/19: 1.542 m (5' 0.71\").    Weight as of this encounter: 80.5 kg (177 lb 8 oz).    Gen: NAD, VSS  Eyes: EOM grossly intact. No periorbital edema, conjunctival injection. Anicteric sclera.  Neck: Supple, no thyromegaly.  Resp: CTAB  CV: RRR, no m/r/g.   Neuro: DTR 2/4 biceps bilaterally.  Feet: Intact to monofilament bilaterally. No edema. No lesions. DP, PT pulses 3+ bilaterally.     Creatinine   Date Value Ref Range Status   09/16/2019 0.68 0.52 - 1.04 mg/dL Final     GFR Estimate   Date Value Ref Range Status   09/16/2019 >90 >60 mL/min/[1.73_m2] Final     Comment:     Non  GFR Calc  Starting 12/18/2018, serum creatinine based estimated GFR (eGFR) will be   calculated using the Chronic Kidney Disease Epidemiology Collaboration   (CKD-EPI) equation.       GFR Estimate If Black   Date Value Ref Range Status   09/16/2019 >90 >60 mL/min/[1.73_m2] Final     Comment:      GFR Calc  Starting 12/18/2018, serum creatinine based estimated GFR (eGFR) will be   calculated using the Chronic Kidney Disease Epidemiology Collaboration   (CKD-EPI) equation.       Lab Results   Component Value Date    MICROL 8 09/16/2019     LDL Cholesterol Calculated   Date Value Ref Range Status   09/16/2019 80 <100 mg/dL Final     Comment:     Desirable:       <100 mg/dl     HDL Cholesterol   Date Value Ref Range Status   09/16/2019 69 >49 mg/dL Final     Cholesterol   Date Value Ref Range Status   09/16/2019 " 164 <200 mg/dL Final     Triglycerides   Date Value Ref Range Status   09/16/2019 74 <150 mg/dL Final     Lab Results   Component Value Date    A1C 7.7 (H) 11/06/2017    A1C 6.6 (A) 08/05/2013    A1C 7.5 (A) 02/04/2013    A1C 7.3 (A) 08/01/2012    A1C 7.2 (A) 01/25/2012    HEMOGLOBINA1 7.1 (A) 09/16/2019    HEMOGLOBINA1 6.6 (A) 06/03/2019    HEMOGLOBINA1 7.2 (A) 08/20/2018    HEMOGLOBINA1 6.9 (A) 05/07/2018    HEMOGLOBINA1 7.3 (A) 11/06/2017     A1c today was 6.3.    Assessment and Plan:    #Type 1 DM: Coral has well-controlled diabetes. A1c today was 6.3% (previously 7.1%). She has several lows per week and is sometimes waking up from lows. She is interested in the Dexcom G6, which may help limit the number/duration of lows she is experiencing. She will check with insurance and reach out if she has trouble finding coverage for the G6. No other changes today.      #DM risk factors:  -- Retinopathy: Upcoming visit with optho. She has not been seen in several years.  -- Neuropathy: Feet ok today, and she has no parasthesias.  --Nephropathy:Urine microlabumin negative 09/2019. Creat and gfr wnl 09/2019.    #CVD risk factors: BP today 138/75. LDL 80, HDL 69, chol 164 in 09/2019. She takes  simvastatin     RTC 3 months to see Melonie Coughlin, 6 months to see Dr. Grover Mata, MS3                Coral was seen and examined by me with medical student Snehal Mata for f/u of her type 1 dm.  Please see student's note of the same date for full details.  In brief, she is having lows but always feels them. Admits that she now has sx at lower BG than in past.  Interested in getting CGM.    blood glucose (ONETOUCH ULTRA) test strip, USE TO TEST FOUR TIMES A DAY  HUMALOG KWIKPEN 100 UNIT/ML soln, Use as directed up to 80 units per day, Disp-3 Month, R-3, PUJA, E-Prescribe  insulin glargine (LANTUS SOLOSTAR) 100 UNIT/ML pen, USE UP TO 35 UNITS DAILY AS DIRECTED  insulin pen needle (B-D U/F) 31G X 8 MM miscellaneous, Use  "as directed for injections 4 times daily  insulin pen needle 31G X 8 MM, Use 4 times daily  Lancets Misc. (ACCU-CHEK SOFTCLIX LANCET DEV) KIT, Use as directed  simvastatin (ZOCOR) 40 MG tablet, Take 1 tablet (40 mg) by mouth At Bedtime  SOFTCLIX LANCETS MISC, Test 4 times daily    No current facility-administered medications on file prior to visit.       ROS: 10 point ROS neg other than the symptoms noted above in the HPI.  Vital signs:   /75   Pulse 93   Wt 80.5 kg (177 lb 8 oz)   BMI 33.86 kg/m     Estimated body mass index is 33.86 kg/m  as calculated from the following:    Height as of 9/16/19: 1.542 m (5' 0.71\").    Weight as of this encounter: 80.5 kg (177 lb 8 oz).    NAD  Eyes - no periorbital edema, conjunctival injection, scleral icterus  Skin - normal texture   Mood - not depressed    Recent Labs   Lab Test 01/21/20 09/16/19  0820 09/16/19  0818 09/16/19  10/18/18  0946 10/18/18  0938  11/06/17  1309  08/14/17  0907  08/05/13  1642   A1C  --   --   --   --   --   --   --   --  7.7*  --   --   --  6.6*   HEMOGLOBINA1 6.3*  --   --  7.1*   < >  --   --    < >  --    < >  --    < >  --    TSH  --  2.29  --   --   --   --   --   --   --   --  2.17   < >  --    LDL  --  80  --   --   --   --  63  --   --   --  87   < >  --    HDL  --  69  --   --   --   --  61  --   --   --  66   < >  --    TRIG  --  74  --   --   --   --  58  --   --   --  74   < >  --    CR  --  0.68  --   --   --   --  0.72  --  0.75  --  0.66   < >  --    MICROL  --   --  8  --   --  16  --    < >  --   --   --    < >  --     < > = values in this interval not displayed.       Assessment and plan:    1.  Diabetes control. She is doing well but I am concerned about her lows, particularly overnight.  She says she always feels them but studies have shown many type 1 dm adults have prolonged lows at night.  Agree that getting CGM is a good idea.  Discussed options of adding low glucose suspend pump,    2.  Diabetes complications.  Up " to date with screens and has none.    3.  CVD risk. BP is at target.  She is on statin.    F/u with Melonie Coughlin in 3 mo and f/u with me in 6 mo      I spent 25 minutes with this patient face to face and explained the conditions and plans (more than 50% of time was counseling/coordination of diabetes care with emphasis on adding new technology) . The patient understood and is satisfied with today's visit.     Henna Ness MD

## 2020-01-23 NOTE — PROGRESS NOTES
"Coarl was seen and examined by me with medical student Snehal Mata for f/u of her type 1 dm.  Please see student's note of the same date for full details.  In brief, she is having lows but always feels them. Admits that she now has sx at lower BG than in past.  Interested in getting CGM.    blood glucose (ONETOUCH ULTRA) test strip, USE TO TEST FOUR TIMES A DAY  HUMALOG KWIKPEN 100 UNIT/ML soln, Use as directed up to 80 units per day, Disp-3 Month, R-3, PUJA, E-Prescribe  insulin glargine (LANTUS SOLOSTAR) 100 UNIT/ML pen, USE UP TO 35 UNITS DAILY AS DIRECTED  insulin pen needle (B-D U/F) 31G X 8 MM miscellaneous, Use as directed for injections 4 times daily  insulin pen needle 31G X 8 MM, Use 4 times daily  Lancets Misc. (ACCU-CHEK SOFTCLIX LANCET DEV) KIT, Use as directed  simvastatin (ZOCOR) 40 MG tablet, Take 1 tablet (40 mg) by mouth At Bedtime  SOFTCLIX LANCETS MISC, Test 4 times daily    No current facility-administered medications on file prior to visit.       ROS: 10 point ROS neg other than the symptoms noted above in the HPI.  Vital signs:   /75   Pulse 93   Wt 80.5 kg (177 lb 8 oz)   BMI 33.86 kg/m    Estimated body mass index is 33.86 kg/m  as calculated from the following:    Height as of 9/16/19: 1.542 m (5' 0.71\").    Weight as of this encounter: 80.5 kg (177 lb 8 oz).    NAD  Eyes - no periorbital edema, conjunctival injection, scleral icterus  Skin - normal texture   Mood - not depressed    Recent Labs   Lab Test 01/21/20 09/16/19  0820 09/16/19  0818 09/16/19  10/18/18  0946 10/18/18  0938  11/06/17  1309  08/14/17  0907  08/05/13  1642   A1C  --   --   --   --   --   --   --   --  7.7*  --   --   --  6.6*   HEMOGLOBINA1 6.3*  --   --  7.1*   < >  --   --    < >  --    < >  --    < >  --    TSH  --  2.29  --   --   --   --   --   --   --   --  2.17   < >  --    LDL  --  80  --   --   --   --  63  --   --   --  87   < >  --    HDL  --  69  --   --   --   --  61  --   --   --  66   < >  " --    TRIG  --  74  --   --   --   --  58  --   --   --  74   < >  --    CR  --  0.68  --   --   --   --  0.72  --  0.75  --  0.66   < >  --    MICROL  --   --  8  --   --  16  --    < >  --   --   --    < >  --     < > = values in this interval not displayed.       Assessment and plan:    1.  Diabetes control. She is doing well but I am concerned about her lows, particularly overnight.  She says she always feels them but studies have shown many type 1 dm adults have prolonged lows at night.  Agree that getting CGM is a good idea.  Discussed options of adding low glucose suspend pump,    2.  Diabetes complications.  Up to date with screens and has none.    3.  CVD risk. BP is at target.  She is on statin.    F/u with Melonie Coughlin in 3 mo and f/u with me in 6 mo      I spent 25 minutes with this patient face to face and explained the conditions and plans (more than 50% of time was counseling/coordination of diabetes care with emphasis on adding new technology) . The patient understood and is satisfied with today's visit.     Henna Ness MD

## 2020-03-18 DIAGNOSIS — E10.9 WELL CONTROLLED TYPE 1 DIABETES MELLITUS (H): ICD-10-CM

## 2020-03-18 RX ORDER — INSULIN LISPRO 100 [IU]/ML
INJECTION, SOLUTION INTRAVENOUS; SUBCUTANEOUS
Qty: 80 ML | Refills: 3 | Status: SHIPPED | OUTPATIENT
Start: 2020-03-18 | End: 2020-08-10

## 2020-03-18 NOTE — TELEPHONE ENCOUNTER
[facility] Call Center    Phone Message    May a detailed message be left on voicemail: yes     Reason for Call: Medication Refill Request    Has the patient contacted the pharmacy for the refill? Yes   Name of medication being requested:Novolog    Provider who prescribed the medication: Dr Ness  Pharmacy: [facility] - [place], [place] - [address] [place] [address]    Date medication is needed: ASAP        Action Taken: Message routed to:  [facility] ([facility]): endocrine med refill team    Travel Screening: Not Applicable

## 2020-03-18 NOTE — TELEPHONE ENCOUNTER
Novolog      NOT ON MEDICATION LIST - REQUESTED BY PATIENT    Last Office Visit : 1-  Future Office visit:  4-    Routing refill request to provider for review/approval because:  Insulin - refilled per clinic        Kathleen M Doege RN

## 2020-04-24 NOTE — PROGRESS NOTES
"Coral Painting is a 49 year old female who is being evaluated via a billable telephone visit.      The patient has been notified of following:     \"This telephone visit will be conducted via a call between you and your physician/provider. We have found that certain health care needs can be provided without the need for a physical exam.  This service lets us provide the care you need with a short phone conversation.  If a prescription is necessary we can send it directly to your pharmacy.  If lab work is needed we can place an order for that and you can then stop by our lab to have the test done at a later time.    Telephone visits are billed at different rates depending on your insurance coverage. During this emergency period, for some insurers they may be billed the same as an in-person visit.  Please reach out to your insurance provider with any questions.    If during the course of the call the physician/provider feels a telephone visit is not appropriate, you will not be charged for this service.\"    Patient has given verbal consent for Telephone visit?  Yes    How would you like to obtain your AVS? Mail a copy    This visit was converted from an in person visit to a virtual visit due to the ongoing COVID-19 pandemic.    Start time: 0730  End time: 0804    HPI:  Ms. Painting is a 50 yo woman here for f/u of type 1 diabetes. She also sees Dr. Ness. At her last visit with Dr. Ness, her a1c had improved considerably, down to 6.3%, but she was having more hypoglycemia.  She felt like things changed when she switched to Humalog and she feels like it has taken her a long time to adjust.  She felt like it was easier to predict with Novolog.  She feels like the Humalog lasts longer.  She had indicated she would be interested in the dexcom G6, but wanted to wait until the new year to start this. She likes the idea of not having to calibrate the sensor and being able to wear it in a discreet location (unlike " the kalee on her arm).     She is still coming into work. Limited people in the building.  Workers are spreading out.       She is testing her blood sugars 4 times a day for the past three months.    AM readings- 115-125, later in the day- she sometimes has a low sugar at work.     Blood sugars have been under good control lately and she has been having very little hypoglycemia.  She is currently taking Lantus 22 units once daily.  She increased this from 21 units when she started seeing higher.  She did not bring her meter today.   She takes Humalog 3 units/15g CHO with meals plus correction of 1/25 over 175 mg/dL.  She had her eye exam deferred due to coronavirus pandemic.  She continues on simvastatin.  She has no other concerns today.        ROS   GENERAL: weight is stable.  No fevers, chills, malaise, night sweats.   HEENT: no dysphagia, diplopia, neck pain or tenderness, dry/scratchy eyes, URI, cough, sinus drainage, tinnitus, sinus pressure  CV: no chest pain, pressure, palpitations, skipped beats, LOC  LUNGS: no SOB, MCGOVERN, cough, sputum production, wheezing   ABDOMEN: no diarrhea, constipation, abdominal pain  EXTREMITIES: no rashes, ulcers, edema.  Right shoulder limitation in ROM- seeing physical therapy.  NEUROLOGY: no changes in vision, tingling or numbness in hands or feet.   MSK: no muscle aches or pains, weakness    Personal Hx   Is a  at the [employer].  Lives alone in a condo.  Drinks socially once a week or so.    Past Medical History  Dyslipidemia  Type I Diabetes Mellitus in her late 20's. Initially diagnosed as type 2, then found to have antibody positivity.    Physical Exam   There were no vitals taken for this visit.  GENERAL: Alert and oriented X3, NAD.     RESULTS  Lab Results   Component Value Date    A1C 7.7 (H) 11/06/2017    A1C 6.6 (A) 08/05/2013    A1C 7.5 (A) 02/04/2013    A1C 7.3 (A) 08/01/2012    A1C 7.2 (A) 01/25/2012    HEMOGLOBINA1 6.3 (A) 01/21/2020     HEMOGLOBINA1 7.1 (A) 09/16/2019    HEMOGLOBINA1 6.6 (A) 06/03/2019    HEMOGLOBINA1 7.2 (A) 08/20/2018    HEMOGLOBINA1 6.9 (A) 05/07/2018       TSH   Date Value Ref Range Status   09/16/2019 2.29 0.40 - 4.00 mU/L Final   08/14/2017 2.17 0.40 - 4.00 mU/L Final   10/24/2016 2.80 0.40 - 4.00 mU/L Final   08/13/2007 1.86 0.4 - 5.0 mU/L Final     T4 Free   Date Value Ref Range Status   08/13/2007 1.02 0.70 - 1.85 ng/dL Final       ALT   Date Value Ref Range Status   09/16/2019 15 0 - 50 U/L Final   06/04/2012 12 0 - 50 U/L Final   ]    Recent Labs   Lab Test 09/16/19  0820 10/18/18  0938  10/20/14  1325 08/05/13  1700   CHOL 164 136   < > 174 189   HDL 69 61   < > 70 59   LDL 80 63   < > 89 102   TRIG 74 58   < > 71 141   CHOLHDLRATIO  --   --   --  2.5 3.2    < > = values in this interval not displayed.       Lab Results   Component Value Date     08/14/2017      Lab Results   Component Value Date    POTASSIUM 4.0 08/14/2017     Lab Results   Component Value Date    CHLORIDE 105 08/14/2017     Lab Results   Component Value Date    MICHELLE 8.5 08/14/2017     Lab Results   Component Value Date    CO2 24 08/14/2017     Lab Results   Component Value Date    BUN 10 08/14/2017     Lab Results   Component Value Date    CR 0.68 09/16/2019       GFR Estimate   Date Value Ref Range Status   09/16/2019 >90 >60 mL/min/[1.73_m2] Final     Comment:     Non  GFR Calc  Starting 12/18/2018, serum creatinine based estimated GFR (eGFR) will be   calculated using the Chronic Kidney Disease Epidemiology Collaboration   (CKD-EPI) equation.     10/18/2018 87 >60 mL/min/1.7m2 Final     Comment:     Non  GFR Calc   11/06/2017 83 >60 mL/min/1.7m2 Final     Comment:     Non  GFR Calc     GFR Estimate If Black   Date Value Ref Range Status   09/16/2019 >90 >60 mL/min/[1.73_m2] Final     Comment:      GFR Calc  Starting 12/18/2018, serum creatinine based estimated GFR (eGFR) will be    calculated using the Chronic Kidney Disease Epidemiology Collaboration   (CKD-EPI) equation.     10/18/2018 >90 >60 mL/min/1.7m2 Final     Comment:      GFR Calc   11/06/2017 >90 >60 mL/min/1.7m2 Final     Comment:      GFR Calc       Lab Results   Component Value Date    MICROL 8 09/16/2019     No results found for: MICROALBUMIN  No results found for: CPEPT, GADAB, ISCAB    No results found for: B12]    Most recent eye exam date: : Not Found     Assessment   Assessment/Plan:      1.  Type 1 DM- Overall, doing well, but her glucose values are variable.  She would like to start on the Dexcom G6 sensor.  Coral meets  guidelines for G6 of:    The patient has diabetes (T1DM complicated by hypoglycemia);    The patient has been using a home blood glucose monitor (BGM) and performing frequent (four or more times a day) BGM testing;    The patient is insulin-treated with three or more daily injections (MDI) of insulin ;    The patient's insulin treatment regimen requires frequent adjustments based on therapeutic CGM testing results;    Within six months prior to ordering the CGM, the patient had an in-person visit with the treating practitioner to evaluate their diabetes control and determine that the above criteria have been met; and    Every six months following the initial prescription of the CGM, the patient has an in-person visit with the treating practitioner to assess adherence to their CGM regimen and diabetes treatment plan.    She will set this up with Kajal Olivier in the next few weeks via video visit.     No changes today.  Discussed COVID-19.  The American diabetes association has issued a nice video with checklist for patients with diabetes:     https://www.diabetes.org/diabetes/treatment-care/planning-sick-days/coronavirus    2.  Risk factors- BP and lipids under good control.  No DM complications.   Creatinine is normal and normal MA, creatinine and TSH.  Will check labs  today.  Obesity- Her BMI is 34.  Spent much of our discussion on lifestyle changes, reducing sedentary time and increasing physical activity.  We discussed heart healthy eating ideas and encouraged her to increase consumption of fruits, vegetables and whole grains.  Due for labs in September. Will defer eye exam until post-COVID.     3.  F/U in 3 months with Dr. Ness.  Call sooner with concerns.      I spent 25 minutes with this patient on the phone and explained the conditions and plans (more than 50% of time was counseling/coordination of care, diabetes management, follow up plan for worsening hyper and hypoglycemia) . The patient understood and is satisfied with today's visit.       Melonie Coughlin PA-C, MPAS   [facility]  Department of Medicine  Division of Endocrinology and Diabetes

## 2020-04-27 ENCOUNTER — VIRTUAL VISIT (OUTPATIENT)
Dept: ENDOCRINOLOGY | Facility: CLINIC | Age: 50
End: 2020-04-27
Payer: COMMERCIAL

## 2020-04-27 DIAGNOSIS — E10.9 WELL CONTROLLED TYPE 1 DIABETES MELLITUS (H): Primary | ICD-10-CM

## 2020-04-27 RX ORDER — PROCHLORPERAZINE 25 MG/1
SUPPOSITORY RECTAL
Qty: 1 EACH | Refills: 3 | Status: SHIPPED | OUTPATIENT
Start: 2020-04-27 | End: 2020-05-14

## 2020-04-27 RX ORDER — PROCHLORPERAZINE 25 MG/1
SUPPOSITORY RECTAL
Qty: 3 EACH | Refills: 11 | Status: SHIPPED | OUTPATIENT
Start: 2020-04-27 | End: 2020-05-14

## 2020-04-27 RX ORDER — PROCHLORPERAZINE 25 MG/1
SUPPOSITORY RECTAL
Qty: 1 EACH | Refills: 0 | Status: SHIPPED | OUTPATIENT
Start: 2020-04-27 | End: 2020-05-14

## 2020-04-27 NOTE — PATIENT INSTRUCTIONS
The American diabetes association has issued a nice video with checklist for patients with diabetes:     https://www.diabetes.org/diabetes/treatment-care/planning-sick-days/coronavirus    Set up appointment with Kajal Olivier to set up Dexcom G6 sensor. This should be a video visit.

## 2020-05-13 DIAGNOSIS — E10.9 WELL CONTROLLED TYPE 1 DIABETES MELLITUS (H): ICD-10-CM

## 2020-05-13 NOTE — TELEPHONE ENCOUNTER
[facility] Call Center    Phone Message    May a detailed message be left on voicemail: yes     Reason for Call: Medication Question or concern regarding medication   Prescription Clarification  Name of Medication:   * Continuous Blood Gluc  (DEXCOM G6 ) SUJEY   * Continuous Blood Gluc Sensor (DEXCOM G6 SENSOR) MISC   * Continuous Blood Gluc Transmit (DEXCOM G6 TRANSMITTER) MISC   * HUMALOG KWIKPEN 100 UNIT/ML soln   Prescribing Provider: Melonie Coughlin   Pharmacy: [facility] - [address]    What on the order needs clarification? Per Patient is needing the medications to go to pharmacy listed above instead.           Action Taken: Message routed to:  [facility] ([facility]): endo    Travel Screening: Not Applicable

## 2020-05-14 RX ORDER — PROCHLORPERAZINE 25 MG/1
SUPPOSITORY RECTAL
Qty: 1 EACH | Refills: 0 | Status: SHIPPED | OUTPATIENT
Start: 2020-05-14 | End: 2021-08-26

## 2020-05-14 RX ORDER — PROCHLORPERAZINE 25 MG/1
SUPPOSITORY RECTAL
Qty: 3 EACH | Refills: 11 | Status: SHIPPED | OUTPATIENT
Start: 2020-05-14 | End: 2020-08-10

## 2020-05-14 RX ORDER — PROCHLORPERAZINE 25 MG/1
SUPPOSITORY RECTAL
Qty: 1 EACH | Refills: 3 | Status: SHIPPED | OUTPATIENT
Start: 2020-05-14 | End: 2021-04-12

## 2020-07-21 ENCOUNTER — VIRTUAL VISIT (OUTPATIENT)
Dept: ENDOCRINOLOGY | Facility: CLINIC | Age: 50
End: 2020-07-21
Payer: COMMERCIAL

## 2020-07-21 DIAGNOSIS — E10.9 TYPE 1 DIABETES MELLITUS WITHOUT COMPLICATION (H): Primary | ICD-10-CM

## 2020-07-21 NOTE — LETTER
7/21/2020       RE: Coral Painting  [address]  [facility] [address]     Dear Colleague,    Thank you for referring your patient, Coral Painting, to the [facility] ENDO[facility]OLOGY at [facility]. Please see a copy of my visit note below.    No answer on her phone.  Left a message that she should reschedule.\\Henna Ness MD          Again, thank you for allowing me to participate in the care of your patient.      Sincerely,    Henna Ness MD

## 2020-07-21 NOTE — LETTER
Date:July 22, 2020      Provider requested that no letter be sent. Do not send.       [facility] Health Information

## 2020-08-05 ENCOUNTER — TELEPHONE (OUTPATIENT)
Dept: ENDOCRINOLOGY | Facility: CLINIC | Age: 50
End: 2020-08-05

## 2020-08-05 DIAGNOSIS — E10.9 WELL CONTROLLED TYPE 1 DIABETES MELLITUS (H): ICD-10-CM

## 2020-08-05 RX ORDER — PEN NEEDLE, DIABETIC 31 GX5/16"
NEEDLE, DISPOSABLE MISCELLANEOUS
Qty: 360 EACH | Refills: 3 | Status: SHIPPED | OUTPATIENT
Start: 2020-08-05 | End: 2020-08-10

## 2020-08-05 NOTE — TELEPHONE ENCOUNTER
[facility] Call Center    Phone Message    May a detailed message be left on voicemail: yes     Reason for Call: Medication Refill Request    Has the patient contacted the pharmacy for the refill? Yes   Name of medication being requested: insulin pen needle (B-D U/F) 31G X 8 MM miscellaneous   Provider who prescribed the medication: Melonie Coughlin PA-C  Pharmacy:    [facility] - [address]    Date medication is needed: ASAP         Action Taken: Message routed to:  [facility] ([facility]): p med refill team    Travel Screening: Not Applicable

## 2020-08-10 ENCOUNTER — PATIENT OUTREACH (OUTPATIENT)
Dept: CARE COORDINATION | Facility: CLINIC | Age: 50
End: 2020-08-10

## 2020-08-10 ENCOUNTER — VIRTUAL VISIT (OUTPATIENT)
Dept: ENDOCRINOLOGY | Facility: CLINIC | Age: 50
End: 2020-08-10
Payer: COMMERCIAL

## 2020-08-10 DIAGNOSIS — E10.9 WELL CONTROLLED TYPE 1 DIABETES MELLITUS (H): ICD-10-CM

## 2020-08-10 DIAGNOSIS — E78.00 HIGH CHOLESTEROL: ICD-10-CM

## 2020-08-10 RX ORDER — INSULIN LISPRO 100 [IU]/ML
INJECTION, SOLUTION INTRAVENOUS; SUBCUTANEOUS
Qty: 80 ML | Refills: 3 | Status: SHIPPED | OUTPATIENT
Start: 2020-08-10 | End: 2021-06-21

## 2020-08-10 RX ORDER — SIMVASTATIN 40 MG
40 TABLET ORAL AT BEDTIME
Qty: 90 TABLET | Refills: 3 | Status: SHIPPED | OUTPATIENT
Start: 2020-08-10 | End: 2020-10-21

## 2020-08-10 RX ORDER — PROCHLORPERAZINE 25 MG/1
SUPPOSITORY RECTAL
Qty: 9 EACH | Refills: 3 | Status: SHIPPED | OUTPATIENT
Start: 2020-08-10 | End: 2021-07-07

## 2020-08-10 RX ORDER — PEN NEEDLE, DIABETIC 31 GX5/16"
NEEDLE, DISPOSABLE MISCELLANEOUS
Qty: 360 EACH | Refills: 3 | Status: SHIPPED | OUTPATIENT
Start: 2020-08-10 | End: 2021-01-07

## 2020-08-10 RX ORDER — LANCETS
EACH MISCELLANEOUS
Qty: 100 EACH | Refills: 3 | Status: SHIPPED | OUTPATIENT
Start: 2020-08-10 | End: 2023-05-08

## 2020-08-10 NOTE — PROGRESS NOTES
"Coral Painting is a 49 year old female who is being evaluated via a billable telephone visit.      The patient has been notified of following:     \"This telephone visit will be conducted via a call between you and your physician/provider. We have found that certain health care needs can be provided without the need for a physical exam.  This service lets us provide the care you need with a short phone conversation.  If a prescription is necessary we can send it directly to your pharmacy.  If lab work is needed we can place an order for that and you can then stop by our lab to have the test done at a later time.    Telephone visits are billed at different rates depending on your insurance coverage. During this emergency period, for some insurers they may be billed the same as an in-person visit.  Please reach out to your insurance provider with any questions.    If during the course of the call the physician/provider feels a telephone visit is not appropriate, you will not be charged for this service.\"    Patient has given verbal consent for Telephone visit?  Yes    How would you like to obtain your AVS? Mail a copy    This visit was converted from an in person visit to a virtual visit due to the ongoing COVID-19 pandemic.    Start time: 0801  End time: 0832    HPI:  Ms. Painting is a 50 yo woman on the phone for follow up of type 1 diabetes.  She also sees Dr. Ness. At her last visit with Dr. Ness, her a1c had improved considerably, down to 6.3%, but she was having more hypoglycemia. We ordered her a dexcom sensor and she received it, but did not feel comfortable starting it without an in person diabetes educator visit.      Her work is eliminating some positions and she will be cut starting next week.  She is worried about her insurance coverage and ability to pay for her diabetes treatment.      She is testing her blood sugars 4 times a day for the past three months.  She feels like humalog \"goes " "longer\" than Novolog and she has not gotten used to it yet.  She tested 3 hours after she took it, sugar was high, so she took a correction.  Then around midnight she felt low. She typically takes her Novolog a few minutes before eating or right after.  AM readings- 124-129, occasionally up to 179 mg/dL.  She has not been having as many low blood sugars.  Once or twice a week.  She does have symptoms of hypoglycemia, but sometimes it is lower than she thinks it should be (in the 50's, vs the 70s).  This varies.     She is currently taking Lantus 22 units once daily.  She did not bring her meter today.   She takes Humalog 3 units/15g CHO with meals plus correction of 1/25 over 175 mg/dL.  She had her eye exam deferred due to coronavirus pandemic.  She continues on simvastatin.  She has no other concerns today.        ROS   GENERAL: weight is stable.  No fevers, chills, malaise, night sweats.   HEENT: no dysphagia, diplopia, neck pain or tenderness, dry/scratchy eyes, URI, cough, sinus drainage, tinnitus, sinus pressure  CV: no chest pain, pressure, palpitations, skipped beats, LOC  LUNGS: no SOB, MCGOVERN, cough, sputum production, wheezing   ABDOMEN: no diarrhea, constipation, abdominal pain  EXTREMITIES: no rashes, ulcers, edema.  Frozen shoulder better.    NEUROLOGY: no changes in vision, tingling or numbness in hands or feet.   MSK: no muscle aches or pains, weakness      Personal Hx   Is a  at the [employer].  Lives alone in a condo.  Drinks socially once a week or so.    Past Medical History  Dyslipidemia  Type I Diabetes Mellitus in her late 20's. Initially diagnosed as type 2, then found to have antibody positivity.    Physical Exam   There were no vitals taken for this visit.  GENERAL: Alert and oriented X3, NAD.     RESULTS  Lab Results   Component Value Date    A1C 7.7 (H) 11/06/2017    A1C 6.6 (A) 08/05/2013    A1C 7.5 (A) 02/04/2013    A1C 7.3 (A) 08/01/2012    A1C 7.2 (A) 01/25/2012    " HEMOGLOBINA1 6.3 (A) 01/21/2020    HEMOGLOBINA1 7.1 (A) 09/16/2019    HEMOGLOBINA1 6.6 (A) 06/03/2019    HEMOGLOBINA1 7.2 (A) 08/20/2018    HEMOGLOBINA1 6.9 (A) 05/07/2018       TSH   Date Value Ref Range Status   09/16/2019 2.29 0.40 - 4.00 mU/L Final   08/14/2017 2.17 0.40 - 4.00 mU/L Final   10/24/2016 2.80 0.40 - 4.00 mU/L Final   08/13/2007 1.86 0.4 - 5.0 mU/L Final     T4 Free   Date Value Ref Range Status   08/13/2007 1.02 0.70 - 1.85 ng/dL Final       ALT   Date Value Ref Range Status   09/16/2019 15 0 - 50 U/L Final   06/04/2012 12 0 - 50 U/L Final   ]    Recent Labs   Lab Test 09/16/19  0820 10/18/18  0938  10/20/14  1325 08/05/13  1700   CHOL 164 136   < > 174 189   HDL 69 61   < > 70 59   LDL 80 63   < > 89 102   TRIG 74 58   < > 71 141   CHOLHDLRATIO  --   --   --  2.5 3.2    < > = values in this interval not displayed.       Lab Results   Component Value Date     08/14/2017      Lab Results   Component Value Date    POTASSIUM 4.0 08/14/2017     Lab Results   Component Value Date    CHLORIDE 105 08/14/2017     Lab Results   Component Value Date    MICHELLE 8.5 08/14/2017     Lab Results   Component Value Date    CO2 24 08/14/2017     Lab Results   Component Value Date    BUN 10 08/14/2017     Lab Results   Component Value Date    CR 0.68 09/16/2019       GFR Estimate   Date Value Ref Range Status   09/16/2019 >90 >60 mL/min/[1.73_m2] Final     Comment:     Non  GFR Calc  Starting 12/18/2018, serum creatinine based estimated GFR (eGFR) will be   calculated using the Chronic Kidney Disease Epidemiology Collaboration   (CKD-EPI) equation.     10/18/2018 87 >60 mL/min/1.7m2 Final     Comment:     Non  GFR Calc   11/06/2017 83 >60 mL/min/1.7m2 Final     Comment:     Non  GFR Calc     GFR Estimate If Black   Date Value Ref Range Status   09/16/2019 >90 >60 mL/min/[1.73_m2] Final     Comment:      GFR Calc  Starting 12/18/2018, serum creatinine  based estimated GFR (eGFR) will be   calculated using the Chronic Kidney Disease Epidemiology Collaboration   (CKD-EPI) equation.     10/18/2018 >90 >60 mL/min/1.7m2 Final     Comment:      GFR Calc   11/06/2017 >90 >60 mL/min/1.7m2 Final     Comment:      GFR Calc       Lab Results   Component Value Date    MICROL 8 09/16/2019     No results found for: MICROALBUMIN  No results found for: CPEPT, GADAB, ISCAB    No results found for: B12]    Most recent eye exam date: : Not Found     Assessment   Assessment/Plan:      1.  Type 1 DM- Overall, doing well, but I have no glucose data to review today.  Asked her to schedule an in person diabetes educator visit to set up her dexcom G6 sensor.  She was hesitant to start it with her unknown insurance situation.  Hypoglycemia seems to be related to correcting highs too soon.  Advised her to take a correction only if has been at least 4 hours since her last dose and to take her Humalog 15-20 minutes before her meal.  I will ask Wandy Villasenor, , to reach out to her to help her figure out options for insurance coverage.      No changes today.  Discussed COVID-19 precautions.  Strongly urged her to increase her physical activity, especially as she will not be working starting next week.  Discussed heart health eating ideas.     2.  Risk factors- BP historically under good control.  Due for labs.  Will do this today. Will schedule eye exam.     3.  F/U in 3 months with Dr. Ness.  Call sooner with concerns.      I spent 31 minutes with this patient on the phone and explained the conditions and plans (more than 50% of time was counseling/coordination of care, diabetes management, follow up plan for worsening hyper and hypoglycemia) . The patient understood and is satisfied with today's visit.       Melonie Coughlin PA-C, MPAS   [facility]  Department of Medicine  Division of Endocrinology and Diabetes

## 2020-08-10 NOTE — PATIENT INSTRUCTIONS
Nice talking with you today, Coral!     Here is a summary of what we discussed:     Your sugars are dropping too much when you take a correction.  Please ensure it has been at least 4 hours since your last dose of Humalog before you correct.  If you are consistently high, then you will need to take more humalog with your meals.      Please work on taking your Humalog 15-20 minutes before you eat.     Set up in person visit with diabetes educator to start your sensor.      Increase your walking/physical activity while you have the time now.      Wandy Villasenor,  will reach out to you about your insurance situation.  Please also ask her about unemployment insurance.                                            Heart Healthy Diet and Lifestyle    - Eat your veggies and fruits -- and switch to whole grains. An abundance and variety of plant foods should make up the majority of your meals. Strive for seven to 10 servings a day of veggies and fruits. Add in beans and lentils.  Switch to whole-grain bread and cereal, and begin to eat more whole-grain rice and pasta products.    - Go nuts. Keep almonds, cashews, pistachios and walnuts on hand for a quick snack. Choose natural peanut butter or almond butter, rather than the kind with hydrogenated fat added. Try hummus as a dip or spread for bread.    - Pass on the butter. Try olive or canola oil as a healthy replacement for butter or margarine. Use it in cooking. Dip bread in flavored olive oil or lightly spread it on whole-grain bread for a tasty alternative to butter.     - Spice it up. Herbs and spices make food tasty and are also rich in health-promoting substances. Season your meals with herbs and spices rather than salt.    - Go fish. Eat fish twice a week. Fresh or water-packed tuna, salmon, trout, mackerel and herring are healthy choices. Grilled fish tastes good and requires little cleanup. Avoid fried fish, unless it's sauteed in a small amount of canola  oil.    - Rein in the red meat. Substitute fish and poultry for red meat. When eaten, make sure it's lean and keep portions small (about the size of a deck of cards). Try to limit sausage, del castillo and other high-fat or processed meats.    - Avoid being sedentary.  Decrease the amount of time spent in daily sedentary behavior.  Prolonged sitting should be interrupted every 30 min for blood glucose benefits.     - Be active.  30 minutes of moderate-to-vigorous intensity aerobic exercise at least 5 days a week or a total of 150 minutes spread over 3 days per week.  2-3 sessions/week of resistance exercise on nonconsecutive days. Flexibility training and balance training 2-3 times/week for older adults with diabetes.     Make an appointment with a dietitian for a personalized meal plan/nutrition review.    Netta careMelonie

## 2020-08-10 NOTE — LETTER
"8/10/2020       RE: Coral Painting  [address]  [facility] [address]     Dear Colleague,    Thank you for referring your patient, Coral Painting, to the [facility] ENDOCRINOLOGY at [facility]. Please see a copy of my visit note below.    Coral Painting is a 49 year old female who is being evaluated via a billable telephone visit.      The patient has been notified of following:     \"This telephone visit will be conducted via a call between you and your physician/provider. We have found that certain health care needs can be provided without the need for a physical exam.  This service lets us provide the care you need with a short phone conversation.  If a prescription is necessary we can send it directly to your pharmacy.  If lab work is needed we can place an order for that and you can then stop by our lab to have the test done at a later time.    Telephone visits are billed at different rates depending on your insurance coverage. During this emergency period, for some insurers they may be billed the same as an in-person visit.  Please reach out to your insurance provider with any questions.    If during the course of the call the physician/provider feels a telephone visit is not appropriate, you will not be charged for this service.\"    Patient has given verbal consent for Telephone visit?  Yes    How would you like to obtain your AVS? Mail a copy    This visit was converted from an in person visit to a virtual visit due to the ongoing COVID-19 pandemic.    Start time: 0801  End time: 0832    HPI:  Ms. Painting is a 50 yo woman on the phone for follow up of type 1 diabetes.  She also sees Dr. Ness. At her last visit with Dr. Ness, her a1c had improved considerably, down to 6.3%, but she was having more hypoglycemia. We ordered her a dexcom sensor and she received it, but did not feel comfortable starting it without an in person diabetes educator " "visit.      Her work is eliminating some positions and she will be cut starting next week.  She is worried about her insurance coverage and ability to pay for her diabetes treatment.      She is testing her blood sugars 4 times a day for the past three months.  She feels like humalog \"goes longer\" than Novolog and she has not gotten used to it yet.  She tested 3 hours after she took it, sugar was high, so she took a correction.  Then around midnight she felt low. She typically takes her Novolog a few minutes before eating or right after.  AM readings- 124-129, occasionally up to 179 mg/dL.  She has not been having as many low blood sugars.  Once or twice a week.  She does have symptoms of hypoglycemia, but sometimes it is lower than she thinks it should be (in the 50's, vs the 70s).  This varies.     She is currently taking Lantus 22 units once daily.  She did not bring her meter today.   She takes Humalog 3 units/15g CHO with meals plus correction of 1/25 over 175 mg/dL.  She had her eye exam deferred due to coronavirus pandemic.  She continues on simvastatin.  She has no other concerns today.        ROS   GENERAL: weight is stable.  No fevers, chills, malaise, night sweats.   HEENT: no dysphagia, diplopia, neck pain or tenderness, dry/scratchy eyes, URI, cough, sinus drainage, tinnitus, sinus pressure  CV: no chest pain, pressure, palpitations, skipped beats, LOC  LUNGS: no SOB, MCGOVERN, cough, sputum production, wheezing   ABDOMEN: no diarrhea, constipation, abdominal pain  EXTREMITIES: no rashes, ulcers, edema.  Frozen shoulder better.    NEUROLOGY: no changes in vision, tingling or numbness in hands or feet.   MSK: no muscle aches or pains, weakness      Personal Hx   Is a  at the [employer].  Lives alone in a condo.  Drinks socially once a week or so.    Past Medical History  Dyslipidemia  Type I Diabetes Mellitus in her late 20's. Initially diagnosed as type 2, then found to have antibody " positivity.    Physical Exam   There were no vitals taken for this visit.  GENERAL: Alert and oriented X3, NAD.     RESULTS  Lab Results   Component Value Date    A1C 7.7 (H) 11/06/2017    A1C 6.6 (A) 08/05/2013    A1C 7.5 (A) 02/04/2013    A1C 7.3 (A) 08/01/2012    A1C 7.2 (A) 01/25/2012    HEMOGLOBINA1 6.3 (A) 01/21/2020    HEMOGLOBINA1 7.1 (A) 09/16/2019    HEMOGLOBINA1 6.6 (A) 06/03/2019    HEMOGLOBINA1 7.2 (A) 08/20/2018    HEMOGLOBINA1 6.9 (A) 05/07/2018       TSH   Date Value Ref Range Status   09/16/2019 2.29 0.40 - 4.00 mU/L Final   08/14/2017 2.17 0.40 - 4.00 mU/L Final   10/24/2016 2.80 0.40 - 4.00 mU/L Final   08/13/2007 1.86 0.4 - 5.0 mU/L Final     T4 Free   Date Value Ref Range Status   08/13/2007 1.02 0.70 - 1.85 ng/dL Final       ALT   Date Value Ref Range Status   09/16/2019 15 0 - 50 U/L Final   06/04/2012 12 0 - 50 U/L Final   ]    Recent Labs   Lab Test 09/16/19  0820 10/18/18  0938  10/20/14  1325 08/05/13  1700   CHOL 164 136   < > 174 189   HDL 69 61   < > 70 59   LDL 80 63   < > 89 102   TRIG 74 58   < > 71 141   CHOLHDLRATIO  --   --   --  2.5 3.2    < > = values in this interval not displayed.       Lab Results   Component Value Date     08/14/2017      Lab Results   Component Value Date    POTASSIUM 4.0 08/14/2017     Lab Results   Component Value Date    CHLORIDE 105 08/14/2017     Lab Results   Component Value Date    MICHELLE 8.5 08/14/2017     Lab Results   Component Value Date    CO2 24 08/14/2017     Lab Results   Component Value Date    BUN 10 08/14/2017     Lab Results   Component Value Date    CR 0.68 09/16/2019       GFR Estimate   Date Value Ref Range Status   09/16/2019 >90 >60 mL/min/[1.73_m2] Final     Comment:     Non  GFR Calc  Starting 12/18/2018, serum creatinine based estimated GFR (eGFR) will be   calculated using the Chronic Kidney Disease Epidemiology Collaboration   (CKD-EPI) equation.     10/18/2018 87 >60 mL/min/1.7m2 Final     Comment:     Non   GFR Calc   11/06/2017 83 >60 mL/min/1.7m2 Final     Comment:     Non  GFR Calc     GFR Estimate If Black   Date Value Ref Range Status   09/16/2019 >90 >60 mL/min/[1.73_m2] Final     Comment:      GFR Calc  Starting 12/18/2018, serum creatinine based estimated GFR (eGFR) will be   calculated using the Chronic Kidney Disease Epidemiology Collaboration   (CKD-EPI) equation.     10/18/2018 >90 >60 mL/min/1.7m2 Final     Comment:      GFR Calc   11/06/2017 >90 >60 mL/min/1.7m2 Final     Comment:      GFR Calc       Lab Results   Component Value Date    MICROL 8 09/16/2019     No results found for: MICROALBUMIN  No results found for: CPEPT, GADAB, ISCAB    No results found for: B12]    Most recent eye exam date: : Not Found     Assessment   Assessment/Plan:      1.  Type 1 DM- Overall, doing well, but I have no glucose data to review today.  Asked her to schedule an in person diabetes educator visit to set up her dexcom G6 sensor.  She was hesitant to start it with her unknown insurance situation.  Hypoglycemia seems to be related to correcting highs too soon.  Advised her to take a correction only if has been at least 4 hours since her last dose and to take her Humalog 15-20 minutes before her meal.  I will ask Wandy Villasenor, , to reach out to her to help her figure out options for insurance coverage.      No changes today.  Discussed COVID-19 precautions.  Strongly urged her to increase her physical activity, especially as she will not be working starting next week.  Discussed heart health eating ideas.     2.  Risk factors- BP historically under good control.  Due for labs.  Will do this today. Will schedule eye exam.     3.  F/U in 3 months with Dr. Ness.  Call sooner with concerns.      I spent 31 minutes with this patient on the phone and explained the conditions and plans (more than 50% of time was  counseling/coordination of care, diabetes management, follow up plan for worsening hyper and hypoglycemia) . The patient understood and is satisfied with today's visit.       Melonie Coughlin PA-C, MPAS   [facility]  Department of Medicine  Division of Endocrinology and Diabetes

## 2020-08-12 NOTE — PROGRESS NOTES
Social Work Intervention  [facility] [place]    Data/Intervention:    Patient Name:  Coral Painting  /Age:  1970 (49 year old)    Visit Type: telephone  Referral Source: Melonie Coughlin PA-C  Reason for Referral:  Losing insurance    Collaborated With:    -Coral    Patient Concerns/Issues:   Coral is losing her job and benefits and wants to discuss options for insurance.    Intervention/Education/Resources Provided:  She doesn't have COBRA available. Discussed M[employer] and she will go online and review. Discussed the income guidelines. She will likely be directed to a private health plan. Discussed using a  to help her with the options.  Also discussed the 1 month insulin program that she can access thru her pharmacy as well as [email or web address] and [employer]. She is just in the process of getting a 3 month's supply of her medications.  She is actively seeking new employment and hopes to have employer based insurance in the coming months.     Assessment/Plan:  Coral was fully engaged in the conversation and will review her options on mnsure. She is aware she can contact me again as needed.    Provided patient/family with contact information and availability.    XIOMY Selby, [facility]    [facility] [place]  [phone]

## 2020-08-13 DIAGNOSIS — E10.9 WELL CONTROLLED TYPE 1 DIABETES MELLITUS (H): ICD-10-CM

## 2020-08-13 LAB
CHOLEST SERPL-MCNC: 134 MG/DL
CREAT SERPL-MCNC: 0.78 MG/DL (ref 0.52–1.04)
CREAT UR-MCNC: 198 MG/DL
GFR SERPL CREATININE-BSD FRML MDRD: 88 ML/MIN/{1.73_M2}
HBA1C MFR BLD: 7 % (ref 0–5.6)
HDLC SERPL-MCNC: 72 MG/DL
LDLC SERPL CALC-MCNC: 50 MG/DL
MICROALBUMIN UR-MCNC: 11 MG/L
MICROALBUMIN/CREAT UR: 5.4 MG/G CR (ref 0–25)
NONHDLC SERPL-MCNC: 62 MG/DL
TRIGL SERPL-MCNC: 61 MG/DL
TSH SERPL DL<=0.005 MIU/L-ACNC: 2.05 MU/L (ref 0.4–4)

## 2020-10-19 ENCOUNTER — TELEPHONE (OUTPATIENT)
Dept: ENDOCRINOLOGY | Facility: CLINIC | Age: 50
End: 2020-10-19

## 2020-10-19 NOTE — TELEPHONE ENCOUNTER
[facility] Call Center    Phone Message    May a detailed message be left on voicemail: yes     Reason for Call: Medication Refill Request    Has the patient contacted the pharmacy for the refill? Yes   Name of medication being requested: simvastatin     Provider who prescribed the medication: Ced    Pharmacy: [facility]  Address: [address] [place] [address] , [place] [address]  Phone: [phone]    Date medication is needed: asap       Action Taken: Message routed to:  [facility] end[facility]    Travel Screening: Not Applicable

## 2020-10-21 DIAGNOSIS — E78.00 HIGH CHOLESTEROL: ICD-10-CM

## 2020-10-21 RX ORDER — SIMVASTATIN 40 MG
40 TABLET ORAL AT BEDTIME
Qty: 90 TABLET | Refills: 3 | Status: SHIPPED | OUTPATIENT
Start: 2020-10-21 | End: 2021-01-07

## 2020-10-21 NOTE — TELEPHONE ENCOUNTER
[facility]. Marsha Devine RN on 10/21/2020 at 10:51 AM

## 2020-11-02 ENCOUNTER — ALLIED HEALTH/NURSE VISIT (OUTPATIENT)
Dept: NURSING | Facility: CLINIC | Age: 50
End: 2020-11-02
Payer: COMMERCIAL

## 2020-11-02 DIAGNOSIS — Z23 NEED FOR PROPHYLACTIC VACCINATION AND INOCULATION AGAINST INFLUENZA: Primary | ICD-10-CM

## 2020-11-02 PROCEDURE — 90471 IMMUNIZATION ADMIN: CPT

## 2020-11-02 PROCEDURE — 90682 RIV4 VACC RECOMBINANT DNA IM: CPT

## 2020-11-02 PROCEDURE — 99207 PR NO CHARGE NURSE ONLY: CPT

## 2021-01-05 ENCOUNTER — TELEPHONE (OUTPATIENT)
Dept: ENDOCRINOLOGY | Facility: CLINIC | Age: 51
End: 2021-01-05

## 2021-01-05 NOTE — TELEPHONE ENCOUNTER
[facility] Call Center    Phone Message    May a detailed message be left on voicemail: yes     Reason for Call: Medication Question or concern regarding medication   Prescription Clarification  Name of Medication:   Test strip  Needles   Simvastatin      Prescribing Provider: ALYSSA Yao     [facility]acy: [facility]  Address: [address] [place] [id number]  Phone: [phone]             What on the order needs clarification?     Please send rx to new pharmacy (insurance change). Pt requesting 90 day supply but not sure if new insurance will cover.             Action Taken: Message routed to:  [facility] endo    Travel Screening: Not Applicable

## 2021-01-07 DIAGNOSIS — E10.9 WELL CONTROLLED TYPE 1 DIABETES MELLITUS (H): ICD-10-CM

## 2021-01-07 DIAGNOSIS — E78.00 HIGH CHOLESTEROL: ICD-10-CM

## 2021-01-07 RX ORDER — SIMVASTATIN 40 MG
40 TABLET ORAL AT BEDTIME
Qty: 90 TABLET | Refills: 3 | Status: SHIPPED | OUTPATIENT
Start: 2021-01-07 | End: 2021-10-04

## 2021-01-07 RX ORDER — PEN NEEDLE, DIABETIC 31 GX5/16"
NEEDLE, DISPOSABLE MISCELLANEOUS
Qty: 360 EACH | Refills: 3 | Status: SHIPPED | OUTPATIENT
Start: 2021-01-07 | End: 2021-08-27

## 2021-01-07 NOTE — TELEPHONE ENCOUNTER
One touch test strips on profile, pen needles and Simvastatin sent to [facility] pharmacy as requested Marsha Devine RN on 1/7/2021 at 9:10 AM

## 2021-01-11 LAB — RETINOPATHY: POSITIVE

## 2021-01-15 ENCOUNTER — TELEPHONE (OUTPATIENT)
Dept: ENDOCRINOLOGY | Facility: CLINIC | Age: 51
End: 2021-01-15

## 2021-01-15 DIAGNOSIS — E10.9 TYPE 1 DIABETES MELLITUS WITHOUT COMPLICATION (H): Primary | ICD-10-CM

## 2021-01-15 NOTE — TELEPHONE ENCOUNTER
[facility] Call Center    Phone Message    May a detailed message be left on voicemail: yes     Reason for Call: Other: Patient calling requesting information on a script for test strips and a prior authorization. Please call to discuss thank you.      Action Taken: Message routed to:  [facility] endo    Travel Screening: Not Applicable

## 2021-01-15 NOTE — TELEPHONE ENCOUNTER
Prior Authorization Retail Medication Request    Medication/Dose: blood glucose (ONETOUCH ULTRA) test strip  ICD code (if different than what is on RX): E10.9  Rationale:Patient needs to communicate with diabetic device     Insurance Name:Medicaid   Insurance ID: [id number]       Pharmacy Information (if different than what is on RX)  Name:    Phone:

## 2021-01-20 NOTE — TELEPHONE ENCOUNTER
PRIOR AUTHORIZATION DENIED    Medication: blood glucose (ONETOUCH ULTRA) test strip--DENIED    Denial Date: 1/20/2021    Denial Rational:       Appeal Information:

## 2021-01-20 NOTE — TELEPHONE ENCOUNTER
PA Initiation    Medication: blood glucose (ONETOUCH ULTRA) test strip   Insurance Company: [place] Medicaid ([facility]) - Phone [phone] Fax [phone]  Pharmacy Filling the Rx: [id number] - [place], [place] - [address] AT [facility]  Filling Pharmacy Phone: [phone]  Filling Pharmacy Fax: [phone]  Start Date: 1/19/2021

## 2021-01-20 NOTE — CONFIDENTIAL NOTE
Pt notified, One Touch test strips excluded and not eligible for coverage. Provider notified. Generic order placed.   Zina Gaston RN on 1/20/2021 at 12:04 PM     Melonie Coughlin PA-C  You 3 minutes ago (12:05 PM)     Ok to substitute whatever meter/strips are covered by her insurance.   ThanksMelonie

## 2021-01-28 NOTE — PROGRESS NOTES
"Outcome for 01/28/21 2:12 PM :Left Voicemail for patient to call back     Outcome for 01/29/21 10:42 AM :Left Voicemail for patient to call back     Coral Painting is a 49 year old female who is being evaluated via a billable telephone visit.      The patient has been notified of following:     \"This telephone visit will be conducted via a call between you and your physician/provider. We have found that certain health care needs can be provided without the need for a physical exam.  This service lets us provide the care you need with a short phone conversation.  If a prescription is necessary we can send it directly to your pharmacy.  If lab work is needed we can place an order for that and you can then stop by our lab to have the test done at a later time.    Telephone visits are billed at different rates depending on your insurance coverage. During this emergency period, for some insurers they may be billed the same as an in-person visit.  Please reach out to your insurance provider with any questions.    If during the course of the call the physician/provider feels a telephone visit is not appropriate, you will not be charged for this service.\"    Patient has given verbal consent for Telephone visit?  Yes    How would you like to obtain your AVS? Mail a copy    This visit was converted from an in person visit to a virtual visit due to the ongoing COVID-19 pandemic.    Start time: 0908  End time: Patient did not answer x 2.  Left message to reschedule if interested.     Melonie Coughlin PA-C  "

## 2021-02-01 ENCOUNTER — VIRTUAL VISIT (OUTPATIENT)
Dept: ENDOCRINOLOGY | Facility: CLINIC | Age: 51
End: 2021-02-01
Payer: MEDICAID

## 2021-02-01 DIAGNOSIS — Z53.9 NO SHOW: Primary | ICD-10-CM

## 2021-02-01 PROCEDURE — 99207 PR NO BILLABLE SERVICE THIS VISIT: CPT | Performed by: PHYSICIAN ASSISTANT

## 2021-02-01 NOTE — LETTER
"2/1/2021       RE: Coral Painting  [address]  [facility] [address]     Dear Colleague,    Thank you for referring your patient, Coral Painting, to the [facility] ENDOCRINOLOGY CLINIC [place] [facility]. Please see a copy of my visit note below.    Outcome for 01/28/21 2:12 PM :Left Voicemail for patient to call back     Outcome for 01/29/21 10:42 AM :Left Voicemail for patient to call back     Coral Painting is a 49 year old female who is being evaluated via a billable telephone visit.      The patient has been notified of following:     \"This telephone visit will be conducted via a call between you and your physician/provider. We have found that certain health care needs can be provided without the need for a physical exam.  This service lets us provide the care you need with a short phone conversation.  If a prescription is necessary we can send it directly to your pharmacy.  If lab work is needed we can place an order for that and you can then stop by our lab to have the test done at a later time.    Telephone visits are billed at different rates depending on your insurance coverage. During this emergency period, for some insurers they may be billed the same as an in-person visit.  Please reach out to your insurance provider with any questions.    If during the course of the call the physician/provider feels a telephone visit is not appropriate, you will not be charged for this service.\"    Patient has given verbal consent for Telephone visit?  Yes    How would you like to obtain your AVS? Mail a copy    This visit was converted from an in person visit to a virtual visit due to the ongoing COVID-19 pandemic.    Start time: 0908  End time: Patient did not answer x 2.  Left message to reschedule if interested.     Melonie Coughlin PA-C    "

## 2021-02-20 ENCOUNTER — HEALTH MAINTENANCE LETTER (OUTPATIENT)
Age: 51
End: 2021-02-20

## 2021-03-11 DIAGNOSIS — E10.9 WELL CONTROLLED TYPE 1 DIABETES MELLITUS (H): ICD-10-CM

## 2021-03-11 NOTE — TELEPHONE ENCOUNTER
[facility] Call Center    Phone Message    May a detailed message be left on voicemail: yes     Reason for Call: Medication Refill Request    Has the patient contacted the pharmacy for the refill? Yes   Name of medication being requested: insulin glargine (LANTUS SOLOSTAR) 100 UNIT/ML pen  Provider who prescribed the medication: Melonie Coughlin  Pharmacy: [id number] - [place], [place] - [address] AT [facility]  Date medication is needed: ASAP  Pt is out.    Pt also needs lab orders placed by Melonie Coughlin for upcoming lab appt on 3/22/21. Please place lab orders.    Action Taken: Message routed to:  [facility]o    Travel Screening: Not Applicable

## 2021-03-11 NOTE — TELEPHONE ENCOUNTER
Centralized Medication Refill Team note:   insulin glargine (LANTUS SOLOSTAR) 100 UNIT/ML pen   Last Written Prescription Date:  8/10/20  Last Fill Quantity: 30 ml ,   # refills: 4  Previously sent to : [email or web address] - [place], [place] - [address] [place] [address]  Last Office Visit : 8/10/20  Future Office visit:  4/12/2021    Routing refill request to provider for review/approval because:   Insulin - refilled per clinic  Requested Pharmacy: [id number] - [place], [place] - [address] AT [facility]       Pt also needs lab orders placed by Melonie Coughlin for upcoming lab appt on 3/22/21. Please place lab orders.  Encounter has been forwarded to provider by Endocrine team

## 2021-03-22 DIAGNOSIS — E10.9 WELL CONTROLLED TYPE 1 DIABETES MELLITUS (H): ICD-10-CM

## 2021-03-22 LAB
ANION GAP SERPL CALCULATED.3IONS-SCNC: 8 MMOL/L (ref 3–14)
BUN SERPL-MCNC: 12 MG/DL (ref 7–30)
CALCIUM SERPL-MCNC: 8.7 MG/DL (ref 8.5–10.1)
CHLORIDE SERPL-SCNC: 106 MMOL/L (ref 94–109)
CO2 SERPL-SCNC: 24 MMOL/L (ref 20–32)
CREAT SERPL-MCNC: 0.72 MG/DL (ref 0.52–1.04)
GFR SERPL CREATININE-BSD FRML MDRD: >90 ML/MIN/{1.73_M2}
GLUCOSE SERPL-MCNC: 251 MG/DL (ref 70–99)
HBA1C MFR BLD: 8.1 % (ref 0–5.6)
POTASSIUM SERPL-SCNC: 4.1 MMOL/L (ref 3.4–5.3)
SODIUM SERPL-SCNC: 138 MMOL/L (ref 133–144)

## 2021-03-22 PROCEDURE — 80048 BASIC METABOLIC PNL TOTAL CA: CPT | Performed by: PATHOLOGY

## 2021-03-22 PROCEDURE — 83036 HEMOGLOBIN GLYCOSYLATED A1C: CPT | Mod: 90 | Performed by: PATHOLOGY

## 2021-03-22 PROCEDURE — 36415 COLL VENOUS BLD VENIPUNCTURE: CPT | Performed by: PATHOLOGY

## 2021-03-31 ENCOUNTER — IMMUNIZATION (OUTPATIENT)
Dept: NURSING | Facility: CLINIC | Age: 51
End: 2021-03-31
Payer: COMMERCIAL

## 2021-03-31 PROCEDURE — 91300 PR COVID VAC PFIZER DIL RECON 30 MCG/0.3 ML IM: CPT

## 2021-03-31 PROCEDURE — 0001A PR COVID VAC PFIZER DIL RECON 30 MCG/0.3 ML IM: CPT

## 2021-04-09 NOTE — PROGRESS NOTES
Outcome for 21 1:25 PM :Left Voicemail for patient to call back  Outcome for 04/10/21 2:57 PM :Left Voicemail for patient to call back   Outcome for 21 8:39 AM :Left Voicemail for patient to call back    This visit was converted from an in person visit to a virtual visit due to the ongoing COVID-19 pandemic.    Start time: 936  End time:     HPI:  Ms. Painting is a 48 yo woman on the phone for follow up of type 1 diabetes.  She also sees Dr. Ness. Since her last visit, her glucose has been high.  Her a1c is up to 8.1%.  She has not yet started her dexcom because her transmitter has .  She is looking forward to starting this.  She lost insurance coverage for a while and was unable to make her appointments.  She has not been working and is looking for a job, which has been stressful.  Her diet has not changed much.  At work, she would be moving around more.     She is testing her blood sugars 4 times a day for the past three months.      Recent glucose:   239  Averages- 14 days- 177  219/172  95/167/236  250/  179/133  289/169/244  264/243    She has noticed all of her mornings have been high lately.  One morning she had an omelette- 4 hours she was good, but then it had gone up.      She had been  taking Lantus 22 units once daily. She has been high every morning.  She went up to 23 units.  She takes Humalog 3 units/15g CHO with meals plus correction of /25 over 175 mg/dL.  She takes Lantus 23 units daily.  She had her eye exam deferred due to coronavirus pandemic.  She continues on simvastatin.  She has no other concerns today.        ROS   GENERAL: weight is stable.  No fevers, chills, malaise, night sweats.   HEENT: no dysphagia, diplopia, neck pain or tenderness, dry/scratchy eyes, URI, cough, sinus drainage, tinnitus, sinus pressure  CV: no chest pain, pressure, palpitations, skipped beats, LOC  LUNGS: no SOB, MCGOVERN, cough, sputum production, wheezing   ABDOMEN: no diarrhea,  constipation, abdominal pain  EXTREMITIES: no rashes, ulcers, edema.  Frozen shoulder better.    NEUROLOGY: no changes in vision, tingling or numbness in hands or feet.   MSK: no muscle aches or pains, weakness      Personal Hx   Is a .  Previously worked at the "[employer].  She lost her job in 2020. Lives alone in a condo.  Drinks socially once a week or so.    Past Medical History  Dyslipidemia  Type I Diabetes Mellitus in her late 20's. Initially diagnosed as type 2, then found to have antibody positivity.    Physical Exam   There were no vitals taken for this visit.  GENERAL: healthy, alert and no distress  RESP: no audible wheeze, cough, or visible cyanosis.  No visible retractions or increased work of breathing.  Able to speak fully in complete sentences.  PSYCH: mentation appears normal, affect normal/bright, judgement and insight intact, normal speech and appearance well-groomed    RESULTS  Lab Results   Component Value Date    A1C 8.1 (H) 03/22/2021    A1C 7.0 (H) 08/13/2020    A1C 7.7 (H) 11/06/2017    A1C 6.6 (A) 08/05/2013    A1C 7.5 (A) 02/04/2013    HEMOGLOBINA1 6.3 (A) 01/21/2020    HEMOGLOBINA1 7.1 (A) 09/16/2019    HEMOGLOBINA1 6.6 (A) 06/03/2019    HEMOGLOBINA1 7.2 (A) 08/20/2018    HEMOGLOBINA1 6.9 (A) 05/07/2018       TSH   Date Value Ref Range Status   08/13/2020 2.05 0.40 - 4.00 mU/L Final   09/16/2019 2.29 0.40 - 4.00 mU/L Final   08/14/2017 2.17 0.40 - 4.00 mU/L Final   10/24/2016 2.80 0.40 - 4.00 mU/L Final   08/13/2007 1.86 0.4 - 5.0 mU/L Final     T4 Free   Date Value Ref Range Status   08/13/2007 1.02 0.70 - 1.85 ng/dL Final       ALT   Date Value Ref Range Status   09/16/2019 15 0 - 50 U/L Final   06/04/2012 12 0 - 50 U/L Final   ]    Recent Labs   Lab Test 08/13/20  1638 09/16/19  0820 10/20/14  1325 10/20/14  1325 08/05/13  1700   CHOL 134 164   < > 174 189   HDL 72 69   < > 70 59   LDL 50 80   < > 89 102   TRIG 61 74   < > 71 141   CHOLHDLRATIO  --   --   --  2.5 3.2     < > = values in this interval not displayed.       Lab Results   Component Value Date     03/22/2021      Lab Results   Component Value Date    POTASSIUM 4.1 03/22/2021     Lab Results   Component Value Date    CHLORIDE 106 03/22/2021     Lab Results   Component Value Date    MICHELLE 8.7 03/22/2021     Lab Results   Component Value Date    CO2 24 03/22/2021     Lab Results   Component Value Date    BUN 12 03/22/2021     Lab Results   Component Value Date    CR 0.72 03/22/2021       GFR Estimate   Date Value Ref Range Status   03/22/2021 >90 >60 mL/min/[1.73_m2] Final     Comment:     Non  GFR Calc  Starting 12/18/2018, serum creatinine based estimated GFR (eGFR) will be   calculated using the Chronic Kidney Disease Epidemiology Collaboration   (CKD-EPI) equation.     08/13/2020 88 >60 mL/min/[1.73_m2] Final     Comment:     Non  GFR Calc  Starting 12/18/2018, serum creatinine based estimated GFR (eGFR) will be   calculated using the Chronic Kidney Disease Epidemiology Collaboration   (CKD-EPI) equation.     09/16/2019 >90 >60 mL/min/[1.73_m2] Final     Comment:     Non  GFR Calc  Starting 12/18/2018, serum creatinine based estimated GFR (eGFR) will be   calculated using the Chronic Kidney Disease Epidemiology Collaboration   (CKD-EPI) equation.       GFR Estimate If Black   Date Value Ref Range Status   03/22/2021 >90 >60 mL/min/[1.73_m2] Final     Comment:      GFR Calc  Starting 12/18/2018, serum creatinine based estimated GFR (eGFR) will be   calculated using the Chronic Kidney Disease Epidemiology Collaboration   (CKD-EPI) equation.     08/13/2020 >90 >60 mL/min/[1.73_m2] Final     Comment:      GFR Calc  Starting 12/18/2018, serum creatinine based estimated GFR (eGFR) will be   calculated using the Chronic Kidney Disease Epidemiology Collaboration   (CKD-EPI) equation.     09/16/2019 >90 >60 mL/min/[1.73_m2] Final     Comment:       GFR Calc  Starting 12/18/2018, serum creatinine based estimated GFR (eGFR) will be   calculated using the Chronic Kidney Disease Epidemiology Collaboration   (CKD-EPI) equation.         Lab Results   Component Value Date    MICROL 11 08/13/2020     No results found for: MICROALBUMIN  No results found for: CPEPT, GADAB, ISCAB    No results found for: B12]    Most recent eye exam date: : Not Found       Assessment   Assessment/Plan:      1.  Type 1 DM- Glucose is higher than in the past.  A1c is up to 8.1%.  She will increase lantus to 25 units and start dexcom.  She will let me know if she has lows.  Encouraged her to start walking 1-2 times a day.     2.  Risk factors- BP historically under good control.  Creatinine normal.  No MA.  Lipids ok on statin. Will schedule eye exam.     3.  F/U in 3 months with Dr. Ness.  Call sooner with concerns.      39 minutes spent on the date of the encounter doing chart review, review of test results, review of continuous glucose sensor, insulin pump data, interpretation of glucose data, patient visit and documentation, counseling/coordination of care, and discussion of follow up plan for worsening hyper and hypoglycemia.  The patient understood and is satisfied with today's visit.       Melonie Coughlin PA-C, MPAS   [facility]  Department of Medicine  Division of Endocrinology and Diabetes

## 2021-04-12 ENCOUNTER — VIRTUAL VISIT (OUTPATIENT)
Dept: ENDOCRINOLOGY | Facility: CLINIC | Age: 51
End: 2021-04-12
Payer: COMMERCIAL

## 2021-04-12 DIAGNOSIS — E10.9 WELL CONTROLLED TYPE 1 DIABETES MELLITUS (H): Primary | ICD-10-CM

## 2021-04-12 DIAGNOSIS — E10.9 TYPE 1 DIABETES MELLITUS WITHOUT COMPLICATION (H): ICD-10-CM

## 2021-04-12 PROCEDURE — 99214 OFFICE O/P EST MOD 30 MIN: CPT | Mod: 95 | Performed by: PHYSICIAN ASSISTANT

## 2021-04-12 RX ORDER — PROCHLORPERAZINE 25 MG/1
SUPPOSITORY RECTAL
Qty: 1 EACH | Refills: 3 | Status: SHIPPED | OUTPATIENT
Start: 2021-04-12 | End: 2021-08-17

## 2021-04-12 NOTE — LETTER
2021       RE: Coral Painting  [address]  [place] [address]     Dear Colleague,    Thank you for referring your patient, Coral Painting, to the [facility] [place] [facility]. Please see a copy of my visit note below.    Outcome for 21 1:25 PM :Left Voicemail for patient to call back  Outcome for 04/10/21 2:57 PM :Left Voicemail for patient to call back   Outcome for 21 8:39 AM :Left Voicemail for patient to call back    This visit was converted from an in person visit to a virtual visit due to the ongoing COVID-19 pandemic.    Start time: 936  End time:     HPI:  Ms. Painting is a 50 yo woman on the phone for follow up of type 1 diabetes.  She also sees Dr. Ness. Since her last visit, her glucose has been high.  Her a1c is up to 8.1%.  She has not yet started her dexcom because her transmitter has .  She is looking forward to starting this.  She lost insurance coverage for a while and was unable to make her appointments.  She has not been working and is looking for a job, which has been stressful.  Her diet has not changed much.  At work, she would be moving around more.     She is testing her blood sugars 4 times a day for the past three months.      Recent glucose:   239  Averages- 14 days- 177  219/172  95/167/236  250/  179/133  289/169/244  264/243    She has noticed all of her mornings have been high lately.  One morning she had an omelette- 4 hours she was good, but then it had gone up.      She had been  taking Lantus 22 units once daily. She has been high every morning.  She went up to 23 units.  She takes Humalog 3 units/15g CHO with meals plus correction of  over 175 mg/dL.  She takes Lantus 23 units daily.  She had her eye exam deferred due to coronavirus pandemic.  She continues on simvastatin.  She has no other concerns today.        ROS   GENERAL: weight is  stable.  No fevers, chills, malaise, night sweats.   HEENT: no dysphagia, diplopia, neck pain or tenderness, dry/scratchy eyes, URI, cough, sinus drainage, tinnitus, sinus pressure  CV: no chest pain, pressure, palpitations, skipped beats, LOC  LUNGS: no SOB, MCGOVERN, cough, sputum production, wheezing   ABDOMEN: no diarrhea, constipation, abdominal pain  EXTREMITIES: no rashes, ulcers, edema.  Frozen shoulder better.    NEUROLOGY: no changes in vision, tingling or numbness in hands or feet.   MSK: no muscle aches or pains, weakness      Personal Hx   Is a .  Previously worked at the [employer].  She lost her job in 2020. Lives alone in a condo.  Drinks socially once a week or so.    Past Medical History  Dyslipidemia  Type I Diabetes Mellitus in her late 20's. Initially diagnosed as type 2, then found to have antibody positivity.    Physical Exam   There were no vitals taken for this visit.  GENERAL: healthy, alert and no distress  RESP: no audible wheeze, cough, or visible cyanosis.  No visible retractions or increased work of breathing.  Able to speak fully in complete sentences.  PSYCH: mentation appears normal, affect normal/bright, judgement and insight intact, normal speech and appearance well-groomed    RESULTS  Lab Results   Component Value Date    A1C 8.1 (H) 03/22/2021    A1C 7.0 (H) 08/13/2020    A1C 7.7 (H) 11/06/2017    A1C 6.6 (A) 08/05/2013    A1C 7.5 (A) 02/04/2013    HEMOGLOBINA1 6.3 (A) 01/21/2020    HEMOGLOBINA1 7.1 (A) 09/16/2019    HEMOGLOBINA1 6.6 (A) 06/03/2019    HEMOGLOBINA1 7.2 (A) 08/20/2018    HEMOGLOBINA1 6.9 (A) 05/07/2018       TSH   Date Value Ref Range Status   08/13/2020 2.05 0.40 - 4.00 mU/L Final   09/16/2019 2.29 0.40 - 4.00 mU/L Final   08/14/2017 2.17 0.40 - 4.00 mU/L Final   10/24/2016 2.80 0.40 - 4.00 mU/L Final   08/13/2007 1.86 0.4 - 5.0 mU/L Final     T4 Free   Date Value Ref Range Status   08/13/2007 1.02 0.70 - 1.85 ng/dL Final       ALT   Date Value Ref  Range Status   09/16/2019 15 0 - 50 U/L Final   06/04/2012 12 0 - 50 U/L Final   ]    Recent Labs   Lab Test 08/13/20  1638 09/16/19  0820 10/20/14  1325 10/20/14  1325 08/05/13  1700   CHOL 134 164   < > 174 189   HDL 72 69   < > 70 59   LDL 50 80   < > 89 102   TRIG 61 74   < > 71 141   CHOLHDLRATIO  --   --   --  2.5 3.2    < > = values in this interval not displayed.       Lab Results   Component Value Date     03/22/2021      Lab Results   Component Value Date    POTASSIUM 4.1 03/22/2021     Lab Results   Component Value Date    CHLORIDE 106 03/22/2021     Lab Results   Component Value Date    MICHELLE 8.7 03/22/2021     Lab Results   Component Value Date    CO2 24 03/22/2021     Lab Results   Component Value Date    BUN 12 03/22/2021     Lab Results   Component Value Date    CR 0.72 03/22/2021       GFR Estimate   Date Value Ref Range Status   03/22/2021 >90 >60 mL/min/[1.73_m2] Final     Comment:     Non  GFR Calc  Starting 12/18/2018, serum creatinine based estimated GFR (eGFR) will be   calculated using the Chronic Kidney Disease Epidemiology Collaboration   (CKD-EPI) equation.     08/13/2020 88 >60 mL/min/[1.73_m2] Final     Comment:     Non  GFR Calc  Starting 12/18/2018, serum creatinine based estimated GFR (eGFR) will be   calculated using the Chronic Kidney Disease Epidemiology Collaboration   (CKD-EPI) equation.     09/16/2019 >90 >60 mL/min/[1.73_m2] Final     Comment:     Non  GFR Calc  Starting 12/18/2018, serum creatinine based estimated GFR (eGFR) will be   calculated using the Chronic Kidney Disease Epidemiology Collaboration   (CKD-EPI) equation.       GFR Estimate If Black   Date Value Ref Range Status   03/22/2021 >90 >60 mL/min/[1.73_m2] Final     Comment:      GFR Calc  Starting 12/18/2018, serum creatinine based estimated GFR (eGFR) will be   calculated using the Chronic Kidney Disease Epidemiology Collaboration   (CKD-EPI)  equation.     08/13/2020 >90 >60 mL/min/[1.73_m2] Final     Comment:      GFR Calc  Starting 12/18/2018, serum creatinine based estimated GFR (eGFR) will be   calculated using the Chronic Kidney Disease Epidemiology Collaboration   (CKD-EPI) equation.     09/16/2019 >90 >60 mL/min/[1.73_m2] Final     Comment:      GFR Calc  Starting 12/18/2018, serum creatinine based estimated GFR (eGFR) will be   calculated using the Chronic Kidney Disease Epidemiology Collaboration   (CKD-EPI) equation.         Lab Results   Component Value Date    MICROL 11 08/13/2020     No results found for: MICROALBUMIN  No results found for: CPEPT, GADAB, ISCAB    No results found for: B12]    Most recent eye exam date: : Not Found       Assessment   Assessment/Plan:      1.  Type 1 DM- Glucose is higher than in the past.  A1c is up to 8.1%.  She will increase lantus to 25 units and start dexcom.  She will let me know if she has lows.  Encouraged her to start walking 1-2 times a day.     2.  Risk factors- BP historically under good control.  Creatinine normal.  No MA.  Lipids ok on statin. Will schedule eye exam.     3.  F/U in 3 months with Dr. Ness.  Call sooner with concerns.      39 minutes spent on the date of the encounter doing chart review, review of test results, review of continuous glucose sensor, insulin pump data, interpretation of glucose data, patient visit and documentation, counseling/coordination of care, and discussion of follow up plan for worsening hyper and hypoglycemia.  The patient understood and is satisfied with today's visit.       Melonie Coughlin PA-C, MPAS   [facility]  Department of Medicine  Division of Endocrinology and Diabetes

## 2021-04-12 NOTE — PATIENT INSTRUCTIONS
Start up dexcom sensor.     Schedule eye exam.     Increase Lantus to 25 units.      Let me know if you have low sugars.      Make a routine.

## 2021-04-21 ENCOUNTER — IMMUNIZATION (OUTPATIENT)
Dept: NURSING | Facility: CLINIC | Age: 51
End: 2021-04-21
Attending: INTERNAL MEDICINE
Payer: COMMERCIAL

## 2021-04-21 PROCEDURE — 0002A PR COVID VAC PFIZER DIL RECON 30 MCG/0.3 ML IM: CPT

## 2021-04-21 PROCEDURE — 91300 PR COVID VAC PFIZER DIL RECON 30 MCG/0.3 ML IM: CPT

## 2021-06-21 DIAGNOSIS — E10.9 WELL CONTROLLED TYPE 1 DIABETES MELLITUS (H): ICD-10-CM

## 2021-06-21 RX ORDER — INSULIN LISPRO 100 [IU]/ML
INJECTION, SOLUTION INTRAVENOUS; SUBCUTANEOUS
Qty: 80 ML | Refills: 3 | Status: SHIPPED | OUTPATIENT
Start: 2021-06-21 | End: 2023-05-08

## 2021-06-21 NOTE — TELEPHONE ENCOUNTER
[facility] Call Center    Phone Message    May a detailed message be left on voicemail: yes     Reason for Call: Medication Question or concern regarding medication   Prescription Clarification  Name of Medication: humalog  Prescribing Provider: Dr. Ness   Pharmacy:    [id number] - [place], [place] - [address] AT [facility] [address] & [facility]     What on the order needs clarification? Patient was wondering if Dr. Ness could refill her prescription for her. This will be her first time refilling it here since she's switched insurance. Please call patient if there's any questions.           Action Taken: Message routed to:  [facility] Endo    Travel Screening: Not Applicable

## 2021-07-07 DIAGNOSIS — E10.9 WELL CONTROLLED TYPE 1 DIABETES MELLITUS (H): ICD-10-CM

## 2021-07-07 RX ORDER — PROCHLORPERAZINE 25 MG/1
SUPPOSITORY RECTAL
Qty: 9 EACH | Refills: 3 | Status: SHIPPED | OUTPATIENT
Start: 2021-07-07 | End: 2021-08-06

## 2021-07-07 NOTE — TELEPHONE ENCOUNTER
[facility] Call Center    Phone Message    May a detailed message be left on voicemail: yes     Reason for Call: Medication Refill Request    Has the patient contacted the pharmacy for the refill? Yes   Name of medication being requested: Continuous Blood Gluc Sensor (DEXCOM G6 SENSOR) [facility]  Provider who prescribed the medication: Melonie Coughlin  Pharmacy:    [facility] #[id number] - [place], [place] - [address] AT [facility] & [facility]    Date medication is needed: ASAP         Action Taken: Message routed to:  [facility] ENDO    Travel Screening: Not Applicable

## 2021-07-07 NOTE — TELEPHONE ENCOUNTER
Last Clinic Visit: 4/12/21, [place] 8/2/21    Call to Coral, message left of refills sent to requested pharmacy

## 2021-07-30 RX ORDER — FERROUS SULFATE 325(65) MG
TABLET ORAL
COMMUNITY
Start: 2021-07-06 | End: 2022-02-28

## 2021-07-30 NOTE — PROGRESS NOTES
Outcome for 07/30/21 10:15 AM :Left Voicemail for patient to call back  Outcome for 07/30/21 12:00 PM :Glucose data sent in [employer] Message   Outcome for 08/01/21 9:56 PM :Patient is sharing data via clinic device website and patient has been instructed to update data on website. Find login information by using [email or web address]  uploaded to dexcom        This visit was converted from an in person visit to a virtual visit due to the ongoing COVID-19 pandemic.    Start time: 0703  End time: 0726    HPI:  Ms. Painting is a 49 yo woman here for follow up of type 1 diabetes.  She also sees Dr. Ness. Since her last visit, she has been doing quite well.  At her last visit, her a1c had climbed to 8.1%, which was quite unusual for her.  She has a new job.  [employer].  She has different insurance.  Humalog is no longer covered. She will need to go back to Novolog. She is working from home.      She started the Dexcom and has found this really helpful.  Overall, it has been very accurate, however she has had a few false lows.  She feels like the sensor has helped in predicting her glucose trends.  She finds more highs when she is eating out.  She is usually taking her Novolog before she eats.  This is harder when she goes out to eat.     Overall average glucose for the past 2 weeks is 145 mg/dL (CV 39%).  Predicted a1c is 6.8%. Recent glucose is as follows:         She had been  taking Lantus 25 units once daily. She takes Humalog 3 units/15g CHO with meals plus correction of 1/25 over 175 mg/dL.   She had her eye exam deferred due to coronavirus pandemic.  She continues on simvastatin.  She found out she is anemic.  She is on ferrous sulfate.  Hair was thinning and has since improved.  She is feeling less tired.  She was having heavy periods, but this is now easing up.  She did a stool sample, which did not show any blood.  She has never had a colonoscopy and is not ready to schedule this now.      She has no  other concerns today.        ROS   GENERAL: weight is stable.  No fevers, chills, malaise, night sweats.   HEENT: no dysphagia, diplopia, neck pain or tenderness, dry/scratchy eyes, URI, cough, sinus drainage, tinnitus, sinus pressure  CV: no chest pain, pressure, palpitations, skipped beats, LOC  LUNGS: no SOB, MCGOVERN, cough, sputum production, wheezing   ABDOMEN: no diarrhea, constipation, abdominal pain  EXTREMITIES: no rashes, ulcers, edema.  Frozen shoulder better.    NEUROLOGY: no changes in vision, tingling or numbness in hands or feet.   MSK: no muscle aches or pains, weakness      Personal Hx   Is a .  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  Drinks socially once a week or so.    Past Medical History  Dyslipidemia  Type I Diabetes Mellitus in her late 20's. Initially diagnosed as type 2, then found to have antibody positivity.    Physical Exam   There were no vitals taken for this visit.  GENERAL: healthy, alert and no distress  RESP: no audible wheeze, cough, or visible cyanosis.  No visible retractions or increased work of breathing.  Able to speak fully in complete sentences.  PSYCH: mentation appears normal, affect normal/bright, judgement and insight intact, normal speech and appearance well-groomed    RESULTS  Lab Results   Component Value Date    A1C 8.1 (H) 03/22/2021    A1C 7.0 (H) 08/13/2020    A1C 7.7 (H) 11/06/2017    A1C 6.6 (A) 08/05/2013    A1C 7.5 (A) 02/04/2013    HEMOGLOBINA1 6.3 (A) 01/21/2020    HEMOGLOBINA1 7.1 (A) 09/16/2019    HEMOGLOBINA1 6.6 (A) 06/03/2019    HEMOGLOBINA1 7.2 (A) 08/20/2018    HEMOGLOBINA1 6.9 (A) 05/07/2018       TSH   Date Value Ref Range Status   08/13/2020 2.05 0.40 - 4.00 mU/L Final   09/16/2019 2.29 0.40 - 4.00 mU/L Final   08/14/2017 2.17 0.40 - 4.00 mU/L Final   10/24/2016 2.80 0.40 - 4.00 mU/L Final   08/13/2007 1.86 0.4 - 5.0 mU/L Final     T4 Free   Date Value Ref Range Status   08/13/2007 1.02  0.70 - 1.85 ng/dL Final       ALT   Date Value Ref Range Status   09/16/2019 15 0 - 50 U/L Final   06/04/2012 12 0 - 50 U/L Final   ]    Recent Labs   Lab Test 08/13/20  1638 09/16/19  0820 10/20/14  1325 08/05/13  1700   CHOL 134 164 174 189   HDL 72 69 70 59   LDL 50 80 89 102   TRIG 61 74 71 141   CHOLHDLRATIO  --   --  2.5 3.2       Lab Results   Component Value Date     03/22/2021      Lab Results   Component Value Date    POTASSIUM 4.1 03/22/2021     Lab Results   Component Value Date    CHLORIDE 106 03/22/2021     Lab Results   Component Value Date    MICHELLE 8.7 03/22/2021     Lab Results   Component Value Date    CO2 24 03/22/2021     Lab Results   Component Value Date    BUN 12 03/22/2021     Lab Results   Component Value Date    CR 0.72 03/22/2021       GFR Estimate   Date Value Ref Range Status   03/22/2021 >90 >60 mL/min/[1.73_m2] Final     Comment:     Non  GFR Calc  Starting 12/18/2018, serum creatinine based estimated GFR (eGFR) will be   calculated using the Chronic Kidney Disease Epidemiology Collaboration   (CKD-EPI) equation.     08/13/2020 88 >60 mL/min/[1.73_m2] Final     Comment:     Non  GFR Calc  Starting 12/18/2018, serum creatinine based estimated GFR (eGFR) will be   calculated using the Chronic Kidney Disease Epidemiology Collaboration   (CKD-EPI) equation.     09/16/2019 >90 >60 mL/min/[1.73_m2] Final     Comment:     Non  GFR Calc  Starting 12/18/2018, serum creatinine based estimated GFR (eGFR) will be   calculated using the Chronic Kidney Disease Epidemiology Collaboration   (CKD-EPI) equation.       GFR Estimate If Black   Date Value Ref Range Status   03/22/2021 >90 >60 mL/min/[1.73_m2] Final     Comment:      GFR Calc  Starting 12/18/2018, serum creatinine based estimated GFR (eGFR) will be   calculated using the Chronic Kidney Disease Epidemiology Collaboration   (CKD-EPI) equation.     08/13/2020 >90 >60  mL/min/[1.73_m2] Final     Comment:      GFR Calc  Starting 12/18/2018, serum creatinine based estimated GFR (eGFR) will be   calculated using the Chronic Kidney Disease Epidemiology Collaboration   (CKD-EPI) equation.     09/16/2019 >90 >60 mL/min/[1.73_m2] Final     Comment:      GFR Calc  Starting 12/18/2018, serum creatinine based estimated GFR (eGFR) will be   calculated using the Chronic Kidney Disease Epidemiology Collaboration   (CKD-EPI) equation.         Lab Results   Component Value Date    MICROL 11 08/13/2020     No results found for: MICROALBUMIN  No results found for: CPEPT, GADAB, ISCAB    No results found for: B12]    Most recent eye exam date: : Not Found       Assessment   Assessment/Plan:      1.  Type 1 DM- Coral's glucose control has improved significantly since adding in the Dexcom CGM.  She is having very minimal hypoglycemia.  No changes today.  She will continue to work on pre-bolusing.  Encouraged increasing physical activity.    2.  Risk factors- BP historically under good control.  Creatinine normal.  No MA.  Lipids ok on statin. Will schedule eye exam. Will check labs.      3.  F/U in 3 months with Dr. Ness.  Call sooner with concerns.      39 minutes spent on the date of the encounter doing chart review, review of test results, review of continuous glucose sensor, interpretation of glucose data, patient visit and documentation, counseling/coordination of care, and discussion of follow up plan for worsening hyper and hypoglycemia.  The patient understood and is satisfied with today's visit.       Melonie Coughlin PA-C, MPAS   [facility]  Department of Medicine  Division of Endocrinology and Diabetes

## 2021-08-02 ENCOUNTER — VIRTUAL VISIT (OUTPATIENT)
Dept: ENDOCRINOLOGY | Facility: CLINIC | Age: 51
End: 2021-08-02
Payer: COMMERCIAL

## 2021-08-02 DIAGNOSIS — E10.9 WELL CONTROLLED TYPE 1 DIABETES MELLITUS (H): Primary | ICD-10-CM

## 2021-08-02 PROCEDURE — 99214 OFFICE O/P EST MOD 30 MIN: CPT | Mod: 95 | Performed by: PHYSICIAN ASSISTANT

## 2021-08-02 NOTE — LETTER
8/2/2021       RE: Coral Painting  [address]  [place] [address]     Dear Colleague,    Thank you for referring your patient, Coral Painting, to the [facility] [place] at [facility]. Please see a copy of my visit note below.    Outcome for 07/30/21 10:15 AM :Left Voicemail for patient to call back  Outcome for 07/30/21 12:00 PM :Glucose data sent in [employer] Message   Outcome for 08/01/21 9:56 PM :Patient is sharing data via clinic device website and patient has been instructed to update data on website. Find login information by using .[employer]  uploaded to dexcom        This visit was converted from an in person visit to a virtual visit due to the ongoing COVID-19 pandemic.    Start time: 0703  End time: 0726    HPI:  Ms. Painting is a 49 yo woman here for follow up of type 1 diabetes.  She also sees Dr. Ness. Since her last visit, she has been doing quite well.  At her last visit, her a1c had climbed to 8.1%, which was quite unusual for her.  She has a new job.  [employer].  She has different insurance.  Humalog is no longer covered. She will need to go back to Novolog. She is working from home.      She started the Dexcom and has found this really helpful.  Overall, it has been very accurate, however she has had a few false lows.  She feels like the sensor has helped in predicting her glucose trends.  She finds more highs when she is eating out.  She is usually taking her Novolog before she eats.  This is harder when she goes out to eat.     Overall average glucose for the past 2 weeks is 145 mg/dL (CV 39%).  Predicted a1c is 6.8%. Recent glucose is as follows:         She had been  taking Lantus 25 units once daily. She takes Humalog 3 units/15g CHO with meals plus correction of 1/25 over 175 mg/dL.   She had her eye exam deferred due to coronavirus pandemic.  She continues on  simvastatin.  She found out she is anemic.  She is on ferrous sulfate.  Hair was thinning and has since improved.  She is feeling less tired.  She was having heavy periods, but this is now easing up.  She did a stool sample, which did not show any blood.  She has never had a colonoscopy and is not ready to schedule this now.      She has no other concerns today.        ROS   GENERAL: weight is stable.  No fevers, chills, malaise, night sweats.   HEENT: no dysphagia, diplopia, neck pain or tenderness, dry/scratchy eyes, URI, cough, sinus drainage, tinnitus, sinus pressure  CV: no chest pain, pressure, palpitations, skipped beats, LOC  LUNGS: no SOB, MCGOVERN, cough, sputum production, wheezing   ABDOMEN: no diarrhea, constipation, abdominal pain  EXTREMITIES: no rashes, ulcers, edema.  Frozen shoulder better.    NEUROLOGY: no changes in vision, tingling or numbness in hands or feet.   MSK: no muscle aches or pains, weakness      Personal Hx   Is a .  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  Drinks socially once a week or so.    Past Medical History  Dyslipidemia  Type I Diabetes Mellitus in her late 20's. Initially diagnosed as type 2, then found to have antibody positivity.    Physical Exam   There were no vitals taken for this visit.  GENERAL: healthy, alert and no distress  RESP: no audible wheeze, cough, or visible cyanosis.  No visible retractions or increased work of breathing.  Able to speak fully in complete sentences.  PSYCH: mentation appears normal, affect normal/bright, judgement and insight intact, normal speech and appearance well-groomed    RESULTS  Lab Results   Component Value Date    A1C 8.1 (H) 03/22/2021    A1C 7.0 (H) 08/13/2020    A1C 7.7 (H) 11/06/2017    A1C 6.6 (A) 08/05/2013    A1C 7.5 (A) 02/04/2013    HEMOGLOBINA1 6.3 (A) 01/21/2020    HEMOGLOBINA1 7.1 (A) 09/16/2019    HEMOGLOBINA1 6.6 (A) 06/03/2019    HEMOGLOBINA1 7.2 (A)  08/20/2018    HEMOGLOBINA1 6.9 (A) 05/07/2018       TSH   Date Value Ref Range Status   08/13/2020 2.05 0.40 - 4.00 mU/L Final   09/16/2019 2.29 0.40 - 4.00 mU/L Final   08/14/2017 2.17 0.40 - 4.00 mU/L Final   10/24/2016 2.80 0.40 - 4.00 mU/L Final   08/13/2007 1.86 0.4 - 5.0 mU/L Final     T4 Free   Date Value Ref Range Status   08/13/2007 1.02 0.70 - 1.85 ng/dL Final       ALT   Date Value Ref Range Status   09/16/2019 15 0 - 50 U/L Final   06/04/2012 12 0 - 50 U/L Final   ]    Recent Labs   Lab Test 08/13/20  1638 09/16/19  0820 10/20/14  1325 08/05/13  1700   CHOL 134 164 174 189   HDL 72 69 70 59   LDL 50 80 89 102   TRIG 61 74 71 141   CHOLHDLRATIO  --   --  2.5 3.2       Lab Results   Component Value Date     03/22/2021      Lab Results   Component Value Date    POTASSIUM 4.1 03/22/2021     Lab Results   Component Value Date    CHLORIDE 106 03/22/2021     Lab Results   Component Value Date    MICHELLE 8.7 03/22/2021     Lab Results   Component Value Date    CO2 24 03/22/2021     Lab Results   Component Value Date    BUN 12 03/22/2021     Lab Results   Component Value Date    CR 0.72 03/22/2021       GFR Estimate   Date Value Ref Range Status   03/22/2021 >90 >60 mL/min/[1.73_m2] Final     Comment:     Non  GFR Calc  Starting 12/18/2018, serum creatinine based estimated GFR (eGFR) will be   calculated using the Chronic Kidney Disease Epidemiology Collaboration   (CKD-EPI) equation.     08/13/2020 88 >60 mL/min/[1.73_m2] Final     Comment:     Non  GFR Calc  Starting 12/18/2018, serum creatinine based estimated GFR (eGFR) will be   calculated using the Chronic Kidney Disease Epidemiology Collaboration   (CKD-EPI) equation.     09/16/2019 >90 >60 mL/min/[1.73_m2] Final     Comment:     Non  GFR Calc  Starting 12/18/2018, serum creatinine based estimated GFR (eGFR) will be   calculated using the Chronic Kidney Disease Epidemiology Collaboration   (CKD-EPI)  equation.       GFR Estimate If Black   Date Value Ref Range Status   03/22/2021 >90 >60 mL/min/[1.73_m2] Final     Comment:      GFR Calc  Starting 12/18/2018, serum creatinine based estimated GFR (eGFR) will be   calculated using the Chronic Kidney Disease Epidemiology Collaboration   (CKD-EPI) equation.     08/13/2020 >90 >60 mL/min/[1.73_m2] Final     Comment:      GFR Calc  Starting 12/18/2018, serum creatinine based estimated GFR (eGFR) will be   calculated using the Chronic Kidney Disease Epidemiology Collaboration   (CKD-EPI) equation.     09/16/2019 >90 >60 mL/min/[1.73_m2] Final     Comment:      GFR Calc  Starting 12/18/2018, serum creatinine based estimated GFR (eGFR) will be   calculated using the Chronic Kidney Disease Epidemiology Collaboration   (CKD-EPI) equation.         Lab Results   Component Value Date    MICROL 11 08/13/2020     No results found for: MICROALBUMIN  No results found for: CPEPT, GADAB, ISCAB    No results found for: B12]    Most recent eye exam date: : Not Found       Assessment   Assessment/Plan:      1.  Type 1 DM- Coral's glucose control has improved significantly since adding in the Dexcom CGM.  She is having very minimal hypoglycemia.  No changes today.  She will continue to work on pre-bolusing.  Encouraged increasing physical activity.    2.  Risk factors- BP historically under good control.  Creatinine normal.  No MA.  Lipids ok on statin. Will schedule eye exam. Will check labs.      3.  F/U in 3 months with Dr. Ness.  Call sooner with concerns.      39 minutes spent on the date of the encounter doing chart review, review of test results, review of continuous glucose sensor, interpretation of glucose data, patient visit and documentation, counseling/coordination of care, and discussion of follow up plan for worsening hyper and hypoglycemia.  The patient understood and is satisfied with today's visit.       Melonie Coughlin,  MARLON, MPAS   [facility]  Department of Medicine  Division of Endocrinology and Diabetes

## 2021-08-05 ENCOUNTER — TELEPHONE (OUTPATIENT)
Dept: ENDOCRINOLOGY | Facility: CLINIC | Age: 51
End: 2021-08-05

## 2021-08-06 DIAGNOSIS — E10.9 TYPE 1 DIABETES MELLITUS WITHOUT COMPLICATION (H): ICD-10-CM

## 2021-08-06 DIAGNOSIS — E10.9 WELL CONTROLLED TYPE 1 DIABETES MELLITUS (H): ICD-10-CM

## 2021-08-06 RX ORDER — PROCHLORPERAZINE 25 MG/1
SUPPOSITORY RECTAL
Qty: 9 EACH | Refills: 3 | Status: SHIPPED | OUTPATIENT
Start: 2021-08-06 | End: 2021-08-17

## 2021-08-10 ENCOUNTER — TELEPHONE (OUTPATIENT)
Dept: ENDOCRINOLOGY | Facility: CLINIC | Age: 51
End: 2021-08-10

## 2021-08-10 NOTE — TELEPHONE ENCOUNTER
Prior Authorization Retail Medication Request    Medication/Dose: blood glucose (NO BRAND SPECIFIED) test strip  ICD code (if different than what is on RX):E10.9  Rationale:Type 1 diabetes     Insurance Name:[employer]  Insurance ID: [id number]      Pharmacy Information (if different than what is on RX)  Name:    Phone:

## 2021-08-11 NOTE — TELEPHONE ENCOUNTER
Prior Authorization Not Needed per Insurance-pharmacy needs to dispense covered brand (Both Contour Next and Accu-Chek Guide are covered without PA).    Medication: blood glucose (NO BRAND SPECIFIED) test strip-Not Needed  Insurance Company: [employer] - Phone [phone] Fax [phone]  Expected CoPay:      Pharmacy Filling the Rx:    Pharmacy Notified:    Patient Notified:

## 2021-08-17 ENCOUNTER — TELEPHONE (OUTPATIENT)
Dept: ENDOCRINOLOGY | Facility: CLINIC | Age: 51
End: 2021-08-17

## 2021-08-17 DIAGNOSIS — E10.9 TYPE 1 DIABETES MELLITUS WITHOUT COMPLICATION (H): ICD-10-CM

## 2021-08-17 DIAGNOSIS — E10.9 WELL CONTROLLED TYPE 1 DIABETES MELLITUS (H): ICD-10-CM

## 2021-08-17 RX ORDER — PROCHLORPERAZINE 25 MG/1
SUPPOSITORY RECTAL
Qty: 9 EACH | Refills: 3 | Status: SHIPPED | OUTPATIENT
Start: 2021-08-17 | End: 2021-08-18

## 2021-08-17 RX ORDER — PROCHLORPERAZINE 25 MG/1
SUPPOSITORY RECTAL
Qty: 1 EACH | Refills: 3 | Status: SHIPPED | OUTPATIENT
Start: 2021-08-17 | End: 2021-08-26

## 2021-08-17 NOTE — TELEPHONE ENCOUNTER
[facility] Call Center    Phone Message    May a detailed message be left on voicemail: yes     Reason for Call: Other: Pt has been trying to get her refills for a couple of weeks, but there have been issues. Pt's current insurance is [employer], but the prior auths seem to continue going through her previous insurance even though her insurance is updated in her chart. Per her current insurance ([employer]) she is needing prior authorizations for her refills. Please call [employer] directly for these prior authorizations at: [phone]    Pt is requesting that a one month refill be sent to [id number] - [place], [place] - [address] AT [facility] for her:  Continuous Blood Gluc Sensor (DEXCOM G6 SENSOR) MISC and blood glucose (NO BRAND SPECIFIED) test strips.  (Pt is out of sensors and out to test strips, so she cannot wait to get them via mail order pharmacy.)    Pt is requesting that 3 month refills be sent to [facility] [place] - [address] [facility] for her Rx: insulin glargine (LANTUS SOLOSTAR) 100 UNIT/ML pen and pen needles    (see previous messages about this in chart 8/10/21 and 8/6/21 and mychart message 8/5/21) Clinic backline was unavailable, and Pt will be in training today and unable to take calls.    Action Taken: Message routed to:  [facility] endo    Travel Screening: Not Applicable

## 2021-08-18 DIAGNOSIS — E10.9 WELL CONTROLLED TYPE 1 DIABETES MELLITUS (H): ICD-10-CM

## 2021-08-18 DIAGNOSIS — E10.9 TYPE 1 DIABETES MELLITUS WITHOUT COMPLICATION (H): ICD-10-CM

## 2021-08-18 RX ORDER — PROCHLORPERAZINE 25 MG/1
SUPPOSITORY RECTAL
Qty: 9 EACH | Refills: 3 | Status: SHIPPED | OUTPATIENT
Start: 2021-08-18 | End: 2021-08-26

## 2021-08-19 ENCOUNTER — TELEPHONE (OUTPATIENT)
Dept: ENDOCRINOLOGY | Facility: CLINIC | Age: 51
End: 2021-08-19

## 2021-08-19 NOTE — TELEPHONE ENCOUNTER
PA Initiation    Medication: Continuous Blood Gluc Sensor (DEXCOM G6 SENSOR) MISC  Insurance Company: [employer] - Phone [phone] Fax [phone]  Pharmacy Filling the Rx: [id number] - [place], [place] - [address] AT [facility]  Filling Pharmacy Phone: [phone]  Filling Pharmacy Fax: [phone]  Start Date: 8/19/2021

## 2021-08-24 NOTE — TELEPHONE ENCOUNTER
Prior Authorization Approval    Authorization Effective Date: 8/23/2021  Authorization Expiration Date: 8/23/2022  Medication: Continuous Blood Gluc Sensor (DEXCOM G6 SENSOR) MISC--APPROVED  Approved Dose/Quantity:   Reference #:     Insurance Company: [employer] - Phone [phone] Fax [phone]  Expected CoPay:       CoPay Card Available:      Foundation Assistance Needed:    Which Pharmacy is filling the prescription (Not needed for infusion/clinic administered): [id number] - [place], [place] - [address] AT [facility]  Pharmacy Notified: Yes  Patient Notified: Yes **Instructed pharmacy to notify patient when script is ready to /ship.**

## 2021-08-26 ENCOUNTER — TELEPHONE (OUTPATIENT)
Dept: ENDOCRINOLOGY | Facility: CLINIC | Age: 51
End: 2021-08-26

## 2021-08-26 DIAGNOSIS — E10.9 WELL CONTROLLED TYPE 1 DIABETES MELLITUS (H): ICD-10-CM

## 2021-08-26 RX ORDER — PROCHLORPERAZINE 25 MG/1
SUPPOSITORY RECTAL
Qty: 1 EACH | Refills: 3 | Status: SHIPPED | OUTPATIENT
Start: 2021-08-26 | End: 2022-10-07

## 2021-08-26 NOTE — TELEPHONE ENCOUNTER
[facility] Call Center    Phone Message    May a detailed message be left on voicemail: yes     Reason for Call: Medication Question or concern regarding medication   Prescription Clarification  Name of Medication: Dexcom Transmitter  Prescribing Provider: Melonie Coughlin   Pharmacy: [facility]   What on the order needs clarification? Pharmacy is calling stating they received part of the Dexcom pkg for the pt.  They did not receive the transmitter portion of the Rx.    Please escribe that over today          Action Taken: Message routed to:  [facility] Endo    Travel Screening: Not Applicable

## 2021-08-27 ENCOUNTER — MYC MEDICAL ADVICE (OUTPATIENT)
Dept: ENDOCRINOLOGY | Facility: CLINIC | Age: 51
End: 2021-08-27

## 2021-08-27 DIAGNOSIS — E10.9 WELL CONTROLLED TYPE 1 DIABETES MELLITUS (H): ICD-10-CM

## 2021-08-27 RX ORDER — PEN NEEDLE, DIABETIC 31 GX5/16"
NEEDLE, DISPOSABLE MISCELLANEOUS
Qty: 400 EACH | Refills: 3 | Status: SHIPPED | OUTPATIENT
Start: 2021-08-27 | End: 2022-12-01

## 2021-08-28 ENCOUNTER — MYC MEDICAL ADVICE (OUTPATIENT)
Dept: ENDOCRINOLOGY | Facility: CLINIC | Age: 51
End: 2021-08-28

## 2021-08-28 DIAGNOSIS — E10.9 WELL CONTROLLED TYPE 1 DIABETES MELLITUS (H): ICD-10-CM

## 2021-08-30 RX ORDER — PROCHLORPERAZINE 25 MG/1
SUPPOSITORY RECTAL
Qty: 9 EACH | Refills: 3 | Status: SHIPPED | OUTPATIENT
Start: 2021-08-30 | End: 2022-07-23

## 2021-09-25 ENCOUNTER — HEALTH MAINTENANCE LETTER (OUTPATIENT)
Age: 51
End: 2021-09-25

## 2021-10-04 DIAGNOSIS — E78.00 HIGH CHOLESTEROL: ICD-10-CM

## 2021-10-04 RX ORDER — SIMVASTATIN 40 MG
40 TABLET ORAL AT BEDTIME
Qty: 90 TABLET | Refills: 3 | Status: SHIPPED | OUTPATIENT
Start: 2021-10-04 | End: 2022-11-03

## 2021-10-04 NOTE — TELEPHONE ENCOUNTER
[facility] Protocol Jbvawy82/04/2021 09:33 AM   LDL on file in past 12 months     Lab orders in place

## 2021-11-11 ENCOUNTER — LAB (OUTPATIENT)
Dept: LAB | Facility: CLINIC | Age: 51
End: 2021-11-11

## 2021-11-11 ENCOUNTER — IMMUNIZATION (OUTPATIENT)
Dept: NURSING | Facility: CLINIC | Age: 51
End: 2021-11-11
Payer: COMMERCIAL

## 2021-11-11 ENCOUNTER — TRANSFERRED RECORDS (OUTPATIENT)
Dept: HEALTH INFORMATION MANAGEMENT | Facility: CLINIC | Age: 51
End: 2021-11-11

## 2021-11-11 LAB
ANION GAP SERPL CALCULATED.3IONS-SCNC: 8 MMOL/L (ref 3–14)
BUN SERPL-MCNC: 10 MG/DL (ref 7–30)
CALCIUM SERPL-MCNC: 8.5 MG/DL (ref 8.5–10.1)
CHLORIDE BLD-SCNC: 106 MMOL/L (ref 94–109)
CHOLEST SERPL-MCNC: 137 MG/DL
CO2 SERPL-SCNC: 22 MMOL/L (ref 20–32)
CREAT SERPL-MCNC: 0.67 MG/DL (ref 0.52–1.04)
CREAT UR-MCNC: 180 MG/DL
FASTING STATUS PATIENT QL REPORTED: YES
GFR SERPL CREATININE-BSD FRML MDRD: >90 ML/MIN/1.73M2
GLUCOSE BLD-MCNC: 208 MG/DL (ref 70–99)
HBA1C MFR BLD: 6.6 % (ref 0–5.6)
HDLC SERPL-MCNC: 50 MG/DL
HOLD SPECIMEN: NORMAL
LDLC SERPL CALC-MCNC: 72 MG/DL
MICROALBUMIN UR-MCNC: 14 MG/L
MICROALBUMIN/CREAT UR: 7.78 MG/G CR (ref 0–25)
NONHDLC SERPL-MCNC: 87 MG/DL
POTASSIUM BLD-SCNC: 4.6 MMOL/L (ref 3.4–5.3)
SODIUM SERPL-SCNC: 136 MMOL/L (ref 133–144)
TRIGL SERPL-MCNC: 74 MG/DL
VIT B12 SERPL-MCNC: 244 PG/ML (ref 193–986)

## 2021-11-11 PROCEDURE — 82043 UR ALBUMIN QUANTITATIVE: CPT | Performed by: PATHOLOGY

## 2021-11-11 PROCEDURE — 83036 HEMOGLOBIN GLYCOSYLATED A1C: CPT | Performed by: PATHOLOGY

## 2021-11-11 PROCEDURE — 0004A PR COVID VAC PFIZER DIL RECON 30 MCG/0.3 ML IM: CPT

## 2021-11-11 PROCEDURE — 90682 RIV4 VACC RECOMBINANT DNA IM: CPT

## 2021-11-11 PROCEDURE — 36415 COLL VENOUS BLD VENIPUNCTURE: CPT | Performed by: PATHOLOGY

## 2021-11-11 PROCEDURE — 90471 IMMUNIZATION ADMIN: CPT

## 2021-11-11 PROCEDURE — 82607 VITAMIN B-12: CPT | Mod: 90 | Performed by: PATHOLOGY

## 2021-11-11 PROCEDURE — 80061 LIPID PANEL: CPT | Performed by: PATHOLOGY

## 2021-11-11 PROCEDURE — 80048 BASIC METABOLIC PNL TOTAL CA: CPT | Performed by: PATHOLOGY

## 2021-11-11 PROCEDURE — 91300 PR COVID VAC PFIZER DIL RECON 30 MCG/0.3 ML IM: CPT

## 2021-11-11 PROCEDURE — 99000 SPECIMEN HANDLING OFFICE-LAB: CPT | Performed by: PATHOLOGY

## 2021-11-21 ENCOUNTER — HOSPITAL ENCOUNTER (EMERGENCY)
Facility: HOSPITAL | Age: 51
Discharge: HOME | End: 2021-11-21
Attending: EMERGENCY MEDICINE | Admitting: EMERGENCY MEDICINE
Payer: COMMERCIAL

## 2021-11-21 ENCOUNTER — APPOINTMENT (OUTPATIENT)
Dept: ULTRASOUND IMAGING | Facility: HOSPITAL | Age: 51
End: 2021-11-21
Attending: EMERGENCY MEDICINE
Payer: COMMERCIAL

## 2021-11-21 VITALS
SYSTOLIC BLOOD PRESSURE: 131 MMHG | TEMPERATURE: 98.1 F | WEIGHT: 180 LBS | HEART RATE: 96 BPM | OXYGEN SATURATION: 100 % | BODY MASS INDEX: 34.34 KG/M2 | RESPIRATION RATE: 20 BRPM | DIASTOLIC BLOOD PRESSURE: 57 MMHG

## 2021-11-21 DIAGNOSIS — N92.4 EXCESSIVE BLEEDING IN PREMENOPAUSAL PERIOD: ICD-10-CM

## 2021-11-21 DIAGNOSIS — D25.9 UTERINE LEIOMYOMA, UNSPECIFIED LOCATION: ICD-10-CM

## 2021-11-21 DIAGNOSIS — D64.9 ANEMIA, UNSPECIFIED TYPE: ICD-10-CM

## 2021-11-21 LAB
ABO/RH(D): NORMAL
ANTIBODY SCREEN: NEGATIVE
APTT PPP: 31 SECONDS (ref 22–38)
BASOPHILS # BLD AUTO: 0.1 10E3/UL (ref 0–0.2)
BASOPHILS NFR BLD AUTO: 0 %
EOSINOPHIL # BLD AUTO: 0 10E3/UL (ref 0–0.7)
EOSINOPHIL NFR BLD AUTO: 0 %
ERYTHROCYTE [DISTWIDTH] IN BLOOD BY AUTOMATED COUNT: 14.7 % (ref 10–15)
HCG SERPL QL: NEGATIVE
HCT VFR BLD AUTO: 30.7 % (ref 35–47)
HGB BLD-MCNC: 9.5 G/DL (ref 11.7–15.7)
IMM GRANULOCYTES # BLD: 0.1 10E3/UL
IMM GRANULOCYTES NFR BLD: 0 %
INR PPP: 1.16 (ref 0.9–1.15)
LYMPHOCYTES # BLD AUTO: 0.7 10E3/UL (ref 0.8–5.3)
LYMPHOCYTES NFR BLD AUTO: 5 %
MCH RBC QN AUTO: 29.2 PG (ref 26.5–33)
MCHC RBC AUTO-ENTMCNC: 30.9 G/DL (ref 31.5–36.5)
MCV RBC AUTO: 95 FL (ref 78–100)
MONOCYTES # BLD AUTO: 0.9 10E3/UL (ref 0–1.3)
MONOCYTES NFR BLD AUTO: 7 %
NEUTROPHILS # BLD AUTO: 11.7 10E3/UL (ref 1.6–8.3)
NEUTROPHILS NFR BLD AUTO: 88 %
NRBC # BLD AUTO: 0 10E3/UL
NRBC BLD AUTO-RTO: 0 /100
PLATELET # BLD AUTO: 288 10E3/UL (ref 150–450)
RBC # BLD AUTO: 3.25 10E6/UL (ref 3.8–5.2)
SPECIMEN EXPIRATION DATE: NORMAL
WBC # BLD AUTO: 13.4 10E3/UL (ref 4–11)

## 2021-11-21 PROCEDURE — 86900 BLOOD TYPING SEROLOGIC ABO: CPT | Performed by: EMERGENCY MEDICINE

## 2021-11-21 PROCEDURE — 96375 TX/PRO/DX INJ NEW DRUG ADDON: CPT

## 2021-11-21 PROCEDURE — 85025 COMPLETE CBC W/AUTO DIFF WBC: CPT | Performed by: EMERGENCY MEDICINE

## 2021-11-21 PROCEDURE — 250N000011 HC RX IP 250 OP 636: Performed by: EMERGENCY MEDICINE

## 2021-11-21 PROCEDURE — 76830 TRANSVAGINAL US NON-OB: CPT

## 2021-11-21 PROCEDURE — 258N000003 HC RX IP 258 OP 636: Performed by: EMERGENCY MEDICINE

## 2021-11-21 PROCEDURE — 96374 THER/PROPH/DIAG INJ IV PUSH: CPT

## 2021-11-21 PROCEDURE — 36415 COLL VENOUS BLD VENIPUNCTURE: CPT | Performed by: EMERGENCY MEDICINE

## 2021-11-21 PROCEDURE — 96361 HYDRATE IV INFUSION ADD-ON: CPT

## 2021-11-21 PROCEDURE — 85730 THROMBOPLASTIN TIME PARTIAL: CPT | Performed by: EMERGENCY MEDICINE

## 2021-11-21 PROCEDURE — 85610 PROTHROMBIN TIME: CPT | Performed by: EMERGENCY MEDICINE

## 2021-11-21 PROCEDURE — 99285 EMERGENCY DEPT VISIT HI MDM: CPT | Mod: 25

## 2021-11-21 PROCEDURE — 84703 CHORIONIC GONADOTROPIN ASSAY: CPT | Performed by: EMERGENCY MEDICINE

## 2021-11-21 RX ORDER — FERROUS SULFATE 325(65) MG
325 TABLET ORAL
Qty: 30 TABLET | Refills: 1 | Status: SHIPPED | OUTPATIENT
Start: 2021-11-21 | End: 2021-12-21

## 2021-11-21 RX ORDER — MORPHINE SULFATE 4 MG/ML
4 INJECTION, SOLUTION INTRAMUSCULAR; INTRAVENOUS
Status: DISCONTINUED | OUTPATIENT
Start: 2021-11-21 | End: 2021-11-21 | Stop reason: HOSPADM

## 2021-11-21 RX ORDER — ONDANSETRON 4 MG/1
4-8 TABLET, ORALLY DISINTEGRATING ORAL EVERY 8 HOURS PRN
Qty: 10 TABLET | Refills: 0 | Status: SHIPPED | OUTPATIENT
Start: 2021-11-21 | End: 2021-11-24

## 2021-11-21 RX ORDER — SODIUM CHLORIDE 9 MG/ML
INJECTION, SOLUTION INTRAVENOUS CONTINUOUS
Status: DISCONTINUED | OUTPATIENT
Start: 2021-11-21 | End: 2021-11-21 | Stop reason: HOSPADM

## 2021-11-21 RX ORDER — KETOROLAC TROMETHAMINE 15 MG/ML
15 INJECTION, SOLUTION INTRAMUSCULAR; INTRAVENOUS ONCE
Status: COMPLETED | OUTPATIENT
Start: 2021-11-21 | End: 2021-11-21

## 2021-11-21 RX ORDER — ONDANSETRON 2 MG/ML
4 INJECTION INTRAMUSCULAR; INTRAVENOUS EVERY 30 MIN PRN
Status: DISCONTINUED | OUTPATIENT
Start: 2021-11-21 | End: 2021-11-21 | Stop reason: HOSPADM

## 2021-11-21 RX ORDER — IBUPROFEN 800 MG/1
800 TABLET, FILM COATED ORAL EVERY 8 HOURS PRN
Qty: 60 TABLET | Refills: 0 | Status: SHIPPED | OUTPATIENT
Start: 2021-11-21 | End: 2021-11-29

## 2021-11-21 RX ADMIN — ONDANSETRON 4 MG: 2 INJECTION INTRAMUSCULAR; INTRAVENOUS at 16:46

## 2021-11-21 RX ADMIN — KETOROLAC TROMETHAMINE 15 MG: 15 INJECTION, SOLUTION INTRAMUSCULAR; INTRAVENOUS at 18:10

## 2021-11-21 RX ADMIN — MORPHINE SULFATE 4 MG: 4 INJECTION INTRAVENOUS at 16:49

## 2021-11-21 RX ADMIN — SODIUM CHLORIDE 1000 ML: 9 INJECTION, SOLUTION INTRAVENOUS at 16:46

## 2021-11-21 RX ADMIN — SODIUM CHLORIDE: 9 INJECTION, SOLUTION INTRAVENOUS at 18:09

## 2021-11-21 ASSESSMENT — ENCOUNTER SYMPTOMS
FEVER: 0
HEADACHES: 0
SHORTNESS OF BREATH: 0
HEMATURIA: 0
NECK STIFFNESS: 0
CONFUSION: 0
EYE REDNESS: 0
DIARRHEA: 0
SORE THROAT: 0
ABDOMINAL PAIN: 1
VOMITING: 1
DIZZINESS: 0
BLOOD IN STOOL: 0
NUMBNESS: 0
ARTHRALGIAS: 0
COUGH: 0
LIGHT-HEADEDNESS: 0
DIFFICULTY URINATING: 0
DYSURIA: 0
NAUSEA: 1
CHILLS: 0
COLOR CHANGE: 0

## 2021-11-21 NOTE — ED PROVIDER NOTES
EMERGENCY DEPARTMENT ENCOUNTER     NAME: Coral Painting   AGE: 51 year old female   YOB: 1970   MRN: [id number]   EVALUATION DATE & TIME: 11/21/2021  4:27 PM   PCP: Sagrario Cheung     Chief Complaint   Patient presents with     Abdominal Pain   :    FINAL IMPRESSION       1. Excessive bleeding in premenopausal period    2. Uterine leiomyoma, unspecified location    3. Anemia, unspecified type           ED COURSE & MEDICAL DECISION MAKING      4:35 PM I met with the patient, obtained history, performed an initial exam, and discussed options and plan for diagnostics and treatment here in the ED.  6:12 PM I reevaluated and updated patient on plan for discharge. Patient is comfortable with plan for discharge.      Pertinent Labs & Imaging studies reviewed. (See chart for details)   51 year old female  presents to the Emergency Department for evaluation of pelvic pain and vaginal bleeding.  Patient is perimenopausal and had gone a few months without a period.  When this started, she has had approximately 2 days of heavy bleeding with painful cramps that cause nausea and vomiting. Initial Vitals Reviewed. Initial exam notable for patient who initially was uncomfortable appearing, mildly tachycardic, holding an empty emesis bag and gagging but not vomiting.  She had suprapubic discomfort.  Differential includes anemia, pregnancy related issue, uterine fibroid, dysfunctional uterine bleeding.  Labs show a hemoglobin of 9.5 and I do not have a recent baseline to compare.  This is not low enough to require transfusion therapy, but I did discuss with her that it is also anemic and she should start taking iron.  Pregnancy is negative.  I did an ultrasound which shows likely a fundal uterine fibroid which is causing the menorrhagia.  She is symptomatically controlled with initial opiates followed by Toradol here.  At this time, I think she is a good candidate for outpatient management with OB as  perhaps they could discuss treatment options for this fibroid.  At her age may be even a hysterectomy would be a possibility, and patient has stated she would be very open to this.  I did discuss the case with Dr. Melgar of [facility] who was in agreement with outpatient follow-up after reviewing the ultrasound result.  She is comfortable with discharge at this time, sent home with a prescription for iron and return precautions.           At the conclusion of the encounter I discussed the results of all of the tests and the disposition. The questions were answered. The patient or family acknowledged understanding and was agreeable with the care plan.         MEDICATIONS GIVEN IN THE EMERGENCY:   Medications   0.9% sodium chloride BOLUS (0 mLs Intravenous Stopped 11/21/21 1808)     Followed by   sodium chloride 0.9% infusion ( Intravenous New Bag 11/21/21 1809)   ondansetron (ZOFRAN) injection 4 mg (4 mg Intravenous Given 11/21/21 1646)   morphine (PF) injection 4 mg (4 mg Intravenous Given 11/21/21 1649)   ketorolac (TORADOL) injection 15 mg (15 mg Intravenous Given 11/21/21 1810)      NEW PRESCRIPTIONS STARTED AT TODAY'S ER VISIT   New Prescriptions    FERROUS SULFATE (FEROSUL) 325 (65 FE) MG TABLET    Take 1 tablet (325 mg) by mouth daily (with breakfast)     ================================================================   HISTORY OF PRESENT ILLNESS       Patient information was obtained from: Patient  Use of Intrepreter: N/A     Coral Painting is a 51 year old female with history of DM1 who presents by EMS for abdominal pain.     Patient presents with suprapubic abdominal pain and increased vaginal bleeding that has been ongoing since Friday (11/19). Patient started her menstrual period, though she has not had one for the past couple of months She reports changing a super plus tampon every hour or two. She suspects that her pain is related to her vaginal bleeding. Patient also has associated nausea  and vomiting, but nothing is coming back up as she has not eaten. Patient has been drinking water to keep up with fluids. She does not see a regular gynecologist. She has experienced one other instance that was similar to her symptoms now, but not this bad. Denies any other symptoms or medical concerns at this time.     ================================================================    REVIEW OF SYSTEMS       Review of Systems   Constitutional: Negative for chills and fever.   HENT: Negative for congestion and sore throat.    Eyes: Negative for redness.   Respiratory: Negative for cough and shortness of breath.    Cardiovascular: Negative for chest pain.   Gastrointestinal: Positive for abdominal pain (suprapubic), nausea and vomiting. Negative for blood in stool and diarrhea.   Genitourinary: Positive for vaginal bleeding. Negative for difficulty urinating, dysuria and hematuria.   Musculoskeletal: Negative for arthralgias and neck stiffness.   Skin: Negative for color change and rash.   Neurological: Negative for dizziness, light-headedness, numbness and headaches.   Psychiatric/Behavioral: Negative for confusion.   All other systems reviewed and are negative.       PAST HISTORY     PAST MEDICAL HISTORY:   Past Medical History:   Diagnosis Date     Type 1 Diabetes Mellitus     Created by Conversion Replacement Utility updated for latest IMO load       PAST SURGICAL HISTORY:   History reviewed. No pertinent surgical history.   CURRENT MEDICATIONS:   ferrous sulfate (FEROSUL) 325 (65 Fe) MG tablet  blood glucose (NO BRAND SPECIFIED) test strip  blood glucose (ONETOUCH ULTRA) test strip  blood glucose monitoring (NO BRAND SPECIFIED) meter device kit  blood glucose monitoring (SOFTCLIX) lancets  Continuous Blood Gluc  (DEXCOM G6 ) SUJEY  Continuous Blood Gluc Sensor (DEXCOM G6 SENSOR) MISC  Continuous Blood Gluc Transmit (DEXCOM G6 TRANSMITTER) MISC  FEROSUL 325 (65 Fe) MG tablet  HUMALOG KWIKPEN 100  UNIT/ML soln  insulin glargine (LANTUS SOLOSTAR) 100 UNIT/ML pen  insulin pen needle (B-D U/F) 31G X 8 MM miscellaneous  insulin pen needle 31G X 8 MM  Lancets Misc. (ACCU-CHEK SOFTCLIX LANCET DEV) KIT  simvastatin (ZOCOR) 40 MG tablet      ALLERGIES:   No Known Allergies   FAMILY HISTORY:   History reviewed. No pertinent family history.   SOCIAL HISTORY:   Social History     Socioeconomic History     Marital status: Single     Spouse name: Not on file     Number of children: Not on file     Years of education: Not on file     Highest education level: Not on file   Occupational History     Not on file   Tobacco Use     Smoking status: Never Smoker     Smokeless tobacco: Never Used   Substance and Sexual Activity     Alcohol use: Not on file     Drug use: Not on file     Sexual activity: Not on file   Other Topics Concern     Not on file   Social History Narrative     Not on file     Social Determinants of Health     Financial Resource Strain: Not on file   Food Insecurity: Not on file   Transportation Needs: Not on file   Physical Activity: Not on file   Stress: Not on file   Social Connections: Not on file   Intimate Partner Violence: Not on file   Housing Stability: Not on file        VITALS  Patient Vitals for the past 24 hrs:   BP Temp Temp src Pulse Resp SpO2 Weight   11/21/21 1800 (!) 166/89 -- -- 101 -- 100 % --   11/21/21 1715 -- -- -- 95 -- 100 % --   11/21/21 1700 -- -- -- 91 -- 100 % --   11/21/21 1628 (!) 158/69 98.1  F (36.7  C) Oral 91 20 100 % 81.6 kg (180 lb)        ================================================================    PHYSICAL EXAM     VITAL SIGNS: BP (!) 166/89   Pulse 101   Temp 98.1  F (36.7  C) (Oral)   Resp 20   Wt 81.6 kg (180 lb)   SpO2 100%   BMI 34.34 kg/m     Constitutional:  Awake, pale. Appears uncomfortable.   HENT:  Atraumatic, oropharynx without exudate or erythema, membranes moist  Lymph:  No adenopathy  Eyes: EOM intact, PERRL, no injection  Neck:  Supple  Respiratory:  Clear to auscultation bilaterally, no wheezes or crackles   Cardiovascular:  Regular rate and rhythm, single S1 and S2   GI: Suprapubic tenderness. nondistended, no rebound or guarding. Holding empty emesis bag.   Musculoskeletal:  Moves all extremities, no lower extremity edema, no deformities    Skin:  Warm, dry  Neurologic:  Alert and oriented x3, no focal deficits noted       ================================================================  LAB       All pertinent labs reviewed and interpreted.   Labs Ordered and Resulted from Time of ED Arrival to Time of ED Departure   INR - Abnormal       Result Value    INR 1.16 (*)    CBC WITH PLATELETS AND DIFFERENTIAL - Abnormal    WBC Count 13.4 (*)     RBC Count 3.25 (*)     Hemoglobin 9.5 (*)     Hematocrit 30.7 (*)     MCV 95      MCH 29.2      MCHC 30.9 (*)     RDW 14.7      Platelet Count 288      % Neutrophils 88      % Lymphocytes 5      % Monocytes 7      % Eosinophils 0      % Basophils 0      % Immature Granulocytes 0      NRBCs per 100 WBC 0      Absolute Neutrophils 11.7 (*)     Absolute Lymphocytes 0.7 (*)     Absolute Monocytes 0.9      Absolute Eosinophils 0.0      Absolute Basophils 0.1      Absolute Immature Granulocytes 0.1 (*)     Absolute NRBCs 0.0     PARTIAL THROMBOPLASTIN TIME - Normal    aPTT 31     HCG QUALITATIVE PREGNANCY - Normal    hCG Serum Qualitative Negative     TYPE AND SCREEN, ADULT   ABO/RH TYPE AND SCREEN        ===============================================================  RADIOLOGY       Reviewed all pertinent imaging. Please see official radiology report.   US Pelvic Complete with Transvaginal   Final Result   IMPRESSION:   1.  Very large uterine mass likely representing a fibroid, obscuring the fundal endometrial canal. GYN consultation recommended.   2.  Benign-appearing cyst right adnexa.                     ================================================================  EKG         I have independently  reviewed and interpreted the EKG(s) documented above.     ================================================================  PROCEDURES         I, Sara Behmanesh , am serving as a scribe to document services personally performed by Dr. Parkinson based on my observation and the provider's statements to me. I, Patrizia Parkinson MD attest that Sara Behmanesh is acting in a scribe capacity, has observed my performance of the services and has documented them in accordance with my direction.     Patrizia Parkinson M.D.   Emergency Medicine   [facility] EMERGENCY DEPARTMENT  [address] [place] [id number]  [phone]  Dept: [phone]        Patrizia Parkinson MD  11/21/21 1828       Patrizia Parkinson MD  11/21/21 [id number]

## 2021-11-21 NOTE — ED NOTES
Bed: JNED-08  Expected date: 11/21/21  Expected time:   Means of arrival: Ambulance  Comments:  SPF  51 F  Abd pain

## 2021-11-21 NOTE — ED TRIAGE NOTES
Patient arrives via EMS for complaints of lower abdominal cramping since Friday. Patient states she started her period on Thursday, which was heavier and more painful than usual. Patient states she hasn't had period in a few months. At times patient states she is soaking a heavy tampon in 30 minutes.

## 2021-11-22 NOTE — DISCHARGE INSTRUCTIONS
As we discussed, your hemoglobin is 9.5 which is not low enough to need a blood transfusion.  I do recommend you start taking some iron which will help build your hemoglobin back up and I prescribed this today.  The ultrasound shows what is likely a uterine fibroid which is typically a benign tumor that causes heavy bleeding and is probably the culprit.  Tomorrow morning, call the number above and tell them you were seen in the ER for heavy bleeding from a fibroid discovered on ultrasound and need a follow-up appointment.

## 2021-11-24 NOTE — PROGRESS NOTES
Coral is a 51 year old who is being evaluated via a billable video visit.      How would you like to obtain your AVS? [employer]  If the video visit is dropped, the invitation should be resent by: Send to e-mail at: [email or web address]  Will anyone else be joining your video visit? No      Outcome for 11/24/21 8:29 AM: [employer] message sent   CAMILLA Oneill    Outcome for 11/26/21 9:19 AM: Left Voicemail    Judy King CMA    Outcome for 11/26/21 2:31 PM: Data uploaded on Dexcom   Judy King CMA      This visit was converted from an in person visit to a virtual visit due to the ongoing COVID-19 pandemic.    Start time: 0700  End time: 0726    HPI:  Ms. Painting is a 52 yo woman here for follow up of type 1 diabetes.  She also sees Dr. Ness. She reports that recently her blood sugars have been much higher. She started having heavy menstrual bleeding and pain and went to the ER last week.   She was diagnosed with a very large fibroid.  Her glucose has been higher.  She feels like a lot had to do with pain and stress.  She didn't eat for almost 4 days, yet her glucose remained high.  She is still bleeding slightly.  Her hemoglobin was down to 9.5.  She just started taking the iron pills with orange juice (she does not like the iron supplements).  She is feeling tired.  No chest pain, shortness of breath, breathing difficulties.  She will be meeting with surgeon later this week to decide how to proceed.     She started the Dexcom earlier this year and has found this really helpful.  Recent glucose is as follows:     Overall average glucose for the past 2 weeks is 193 mg/dL (CV 32%).  Up from 145 mg/dL at last visit.  Recent glucose is as follows:             She had been  taking Lantus 25 units every . She takes Humalog 3 units/15g CHO with meals plus correction of 1/25 over 175 mg/dL.  She feels like she is having to do this more often.      She had a recent eye exam with no changes, per  her report.  She continues on simvastatin.   She did a stool sample, which did not show any blood.  She has never had a colonoscopy and is not ready to schedule this now.      She has no other concerns today.        ROS   GENERAL: weight is stable.  No fevers, chills, malaise, night sweats.   HEENT: no dysphagia, diplopia, neck pain or tenderness, dry/scratchy eyes, URI, cough, sinus drainage, tinnitus, sinus pressure  CV: no chest pain, pressure, palpitations, skipped beats, LOC  LUNGS: no SOB, MCGOVERN, cough, sputum production, wheezing   ABDOMEN: no diarrhea, constipation, abdominal pain  EXTREMITIES: no rashes, ulcers, edema.  Frozen shoulder better.    NEUROLOGY: no changes in vision, tingling or numbness in hands or feet.   MSK: no muscle aches or pains, weakness      Personal Hx   Is a .  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  Drinks socially once a week or so.    Past Medical History  Dyslipidemia  Type I Diabetes Mellitus in her late 20's. Initially diagnosed as type 2, then found to have antibody positivity.    Physical Exam   There were no vitals taken for this visit.  GENERAL: healthy, alert and no distress  RESP: no audible wheeze, cough, or visible cyanosis.  No visible retractions or increased work of breathing.  Able to speak fully in complete sentences.  PSYCH: mentation appears normal, affect normal/bright, judgement and insight intact, normal speech and appearance well-groomed    RESULTS  Lab Results   Component Value Date    A1C 6.6 (H) 11/11/2021    A1C 8.1 (H) 03/22/2021    A1C 7.0 (H) 08/13/2020    A1C 7.7 (H) 11/06/2017    A1C 6.6 (A) 08/05/2013    A1C 7.5 (A) 02/04/2013    HEMOGLOBINA1 6.3 (A) 01/21/2020    HEMOGLOBINA1 7.1 (A) 09/16/2019    HEMOGLOBINA1 6.6 (A) 06/03/2019    HEMOGLOBINA1 7.2 (A) 08/20/2018    HEMOGLOBINA1 6.9 (A) 05/07/2018       TSH   Date Value Ref Range Status   08/13/2020 2.05 0.40 - 4.00  mU/L Final   09/16/2019 2.29 0.40 - 4.00 mU/L Final   08/14/2017 2.17 0.40 - 4.00 mU/L Final   10/24/2016 2.80 0.40 - 4.00 mU/L Final   08/13/2007 1.86 0.4 - 5.0 mU/L Final     T4 Free   Date Value Ref Range Status   08/13/2007 1.02 0.70 - 1.85 ng/dL Final       ALT   Date Value Ref Range Status   09/16/2019 15 0 - 50 U/L Final   06/04/2012 12 0 - 50 U/L Final   ]    Recent Labs   Lab Test 11/11/21  0913 08/13/20  1638 08/14/17  0907 10/20/14  1325   CHOL 137 134   < > 174   HDL 50 72   < > 70   LDL 72 50   < > 89   TRIG 74 61   < > 71   CHOLHDLRATIO  --   --   --  2.5    < > = values in this interval not displayed.       Lab Results   Component Value Date     11/11/2021     03/22/2021      Lab Results   Component Value Date    POTASSIUM 4.6 11/11/2021    POTASSIUM 4.1 03/22/2021     Lab Results   Component Value Date    CHLORIDE 106 11/11/2021    CHLORIDE 106 03/22/2021     Lab Results   Component Value Date    MICHELLE 8.5 11/11/2021    MICHELLE 8.7 03/22/2021     Lab Results   Component Value Date    CO2 22 11/11/2021    CO2 24 03/22/2021     Lab Results   Component Value Date    BUN 10 11/11/2021    BUN 12 03/22/2021     Lab Results   Component Value Date    CR 0.67 11/11/2021    CR 0.72 03/22/2021       GFR Estimate   Date Value Ref Range Status   11/11/2021 >90 >60 mL/min/1.73m2 Final     Comment:     As of July 11, 2021, eGFR is calculated by the CKD-EPI creatinine equation, without race adjustment. eGFR can be influenced by muscle mass, exercise, and diet. The reported eGFR is an estimation only and is only applicable if the renal function is stable.   03/22/2021 >90 >60 mL/min/[1.73_m2] Final     Comment:     Non  GFR Calc  Starting 12/18/2018, serum creatinine based estimated GFR (eGFR) will be   calculated using the Chronic Kidney Disease Epidemiology Collaboration   (CKD-EPI) equation.     08/13/2020 88 >60 mL/min/[1.73_m2] Final     Comment:     Non  GFR Calc  Starting  12/18/2018, serum creatinine based estimated GFR (eGFR) will be   calculated using the Chronic Kidney Disease Epidemiology Collaboration   (CKD-EPI) equation.     09/16/2019 >90 >60 mL/min/[1.73_m2] Final     Comment:     Non  GFR Calc  Starting 12/18/2018, serum creatinine based estimated GFR (eGFR) will be   calculated using the Chronic Kidney Disease Epidemiology Collaboration   (CKD-EPI) equation.       GFR Estimate If Black   Date Value Ref Range Status   03/22/2021 >90 >60 mL/min/[1.73_m2] Final     Comment:      GFR Calc  Starting 12/18/2018, serum creatinine based estimated GFR (eGFR) will be   calculated using the Chronic Kidney Disease Epidemiology Collaboration   (CKD-EPI) equation.     08/13/2020 >90 >60 mL/min/[1.73_m2] Final     Comment:      GFR Calc  Starting 12/18/2018, serum creatinine based estimated GFR (eGFR) will be   calculated using the Chronic Kidney Disease Epidemiology Collaboration   (CKD-EPI) equation.     09/16/2019 >90 >60 mL/min/[1.73_m2] Final     Comment:      GFR Calc  Starting 12/18/2018, serum creatinine based estimated GFR (eGFR) will be   calculated using the Chronic Kidney Disease Epidemiology Collaboration   (CKD-EPI) equation.         Lab Results   Component Value Date    MICROL 14 11/11/2021    MICROL 11 08/13/2020     No results found for: MICROALBUMIN  No results found for: CPEPT, GADAB, ISCAB    Vitamin B12   Date Value Ref Range Status   11/11/2021 244 193 - 986 pg/mL Final   ]    Most recent eye exam date: : Not Found   She had eye appointment 11/11/21.   Assessment   Assessment/Plan:      1.  Type 1 DM-   Coral's glucose is higher recently, likely due to increased pain and stress.  Last a1c was 6.6%, however she is anemic, so this may be falsely low. We made the following changes today (instructions given to patient):     Increase Lantus to 27 units daily.  If you start waking up low, back down to 25.      Consider In-pen [email or web address].     Increase Novolog to 3.5 units/carb (approximately 1/4g).      If you find you're still high, change correction to 1/20 mg/dL.    Pre-meal:   120- 1 unit  140- 2 units  160- 3 units  180- 4 units.     Bedtime:   160- 1 unit  180- 2 units  200- 3 units, etc.     Let me know if you start dropping low or if you remain high.      2.  Risk factors- BP historically under good control.  Creatinine normal.  No MA.  Lipids ok on statin. Had recent eye exam.     3.  F/U in 3 months with Dr. Ness.  Call sooner with concerns.      39 minutes spent on the date of the encounter doing chart review, review of test results, review of continuous glucose sensor, interpretation of glucose data, patient visit and documentation, counseling/coordination of care, and discussion of follow up plan for worsening hyper and hypoglycemia.  The patient understood and is satisfied with today's visit.       Melonie Coughlin PA-C, MPAS   [facility]  Department of Medicine  Division of Endocrinology and Diabetes

## 2021-11-26 ENCOUNTER — TELEPHONE (OUTPATIENT)
Dept: ENDOCRINOLOGY | Facility: CLINIC | Age: 51
End: 2021-11-26
Payer: COMMERCIAL

## 2021-11-29 ENCOUNTER — VIRTUAL VISIT (OUTPATIENT)
Dept: ENDOCRINOLOGY | Facility: CLINIC | Age: 51
End: 2021-11-29
Payer: COMMERCIAL

## 2021-11-29 DIAGNOSIS — E10.65 TYPE 1 DIABETES MELLITUS WITH HYPERGLYCEMIA (H): Primary | ICD-10-CM

## 2021-11-29 PROCEDURE — 99214 OFFICE O/P EST MOD 30 MIN: CPT | Mod: 95 | Performed by: PHYSICIAN ASSISTANT

## 2021-11-29 NOTE — LETTER
11/29/2021       RE: Coral Painting  [address]  [facility] [address]     Dear Colleague,    Thank you for referring your patient, Coral Painting, to the [facility] ENDOCRINOLOGY [facility] [place] [facility]. Please see a copy of my visit note below.    Coral is a 51 year old who is being evaluated via a billable video visit.      How would you like to obtain your AVS? SynergEyeshart  If the video visit is dropped, the invitation should be resent by: Send to e-mail at: [email or web address]  Will anyone else be joining your video visit? No      Outcome for 11/24/21 8:29 AM: [employer] message sent   CAMILLA Oneill    Outcome for 11/26/21 9:19 AM: Left Voicemail    Judy King CMA    Outcome for 11/26/21 2:31 PM: Data uploaded on Dexcom   Judy King CMA      This visit was converted from an in person visit to a virtual visit due to the ongoing COVID-19 pandemic.    Start time: 0700  End time: 0726    HPI:  Ms. Painting is a 52 yo woman here for follow up of type 1 diabetes.  She also sees Dr. Ness. She reports that recently her blood sugars have been much higher. She started having heavy menstrual bleeding and pain and went to the ER last week.   She was diagnosed with a very large fibroid.  Her glucose has been higher.  She feels like a lot had to do with pain and stress.  She didn't eat for almost 4 days, yet her glucose remained high.  She is still bleeding slightly.  Her hemoglobin was down to 9.5.  She just started taking the iron pills with orange juice (she does not like the iron supplements).  She is feeling tired.  No chest pain, shortness of breath, breathing difficulties.  She will be meeting with surgeon later this week to decide how to proceed.     She started the Dexcom earlier this year and has found this really helpful.  Recent glucose is as follows:     Overall average glucose for the past 2 weeks is  193 mg/dL (CV 32%).  Up from 145 mg/dL at last visit.  Recent glucose is as follows:             She had been  taking Lantus 25 units every . She takes Humalog 3 units/15g CHO with meals plus correction of 1/25 over 175 mg/dL.  She feels like she is having to do this more often.      She had a recent eye exam with no changes, per her report.  She continues on simvastatin.   She did a stool sample, which did not show any blood.  She has never had a colonoscopy and is not ready to schedule this now.      She has no other concerns today.        ROS   GENERAL: weight is stable.  No fevers, chills, malaise, night sweats.   HEENT: no dysphagia, diplopia, neck pain or tenderness, dry/scratchy eyes, URI, cough, sinus drainage, tinnitus, sinus pressure  CV: no chest pain, pressure, palpitations, skipped beats, LOC  LUNGS: no SOB, MCGOVERN, cough, sputum production, wheezing   ABDOMEN: no diarrhea, constipation, abdominal pain  EXTREMITIES: no rashes, ulcers, edema.  Frozen shoulder better.    NEUROLOGY: no changes in vision, tingling or numbness in hands or feet.   MSK: no muscle aches or pains, weakness      Personal Hx   Is a .  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  Drinks socially once a week or so.    Past Medical History  Dyslipidemia  Type I Diabetes Mellitus in her late 20's. Initially diagnosed as type 2, then found to have antibody positivity.    Physical Exam   There were no vitals taken for this visit.  GENERAL: healthy, alert and no distress  RESP: no audible wheeze, cough, or visible cyanosis.  No visible retractions or increased work of breathing.  Able to speak fully in complete sentences.  PSYCH: mentation appears normal, affect normal/bright, judgement and insight intact, normal speech and appearance well-groomed    RESULTS  Lab Results   Component Value Date    A1C 6.6 (H) 11/11/2021    A1C 8.1 (H) 03/22/2021    A1C 7.0 (H)  08/13/2020    A1C 7.7 (H) 11/06/2017    A1C 6.6 (A) 08/05/2013    A1C 7.5 (A) 02/04/2013    HEMOGLOBINA1 6.3 (A) 01/21/2020    HEMOGLOBINA1 7.1 (A) 09/16/2019    HEMOGLOBINA1 6.6 (A) 06/03/2019    HEMOGLOBINA1 7.2 (A) 08/20/2018    HEMOGLOBINA1 6.9 (A) 05/07/2018       TSH   Date Value Ref Range Status   08/13/2020 2.05 0.40 - 4.00 mU/L Final   09/16/2019 2.29 0.40 - 4.00 mU/L Final   08/14/2017 2.17 0.40 - 4.00 mU/L Final   10/24/2016 2.80 0.40 - 4.00 mU/L Final   08/13/2007 1.86 0.4 - 5.0 mU/L Final     T4 Free   Date Value Ref Range Status   08/13/2007 1.02 0.70 - 1.85 ng/dL Final       ALT   Date Value Ref Range Status   09/16/2019 15 0 - 50 U/L Final   06/04/2012 12 0 - 50 U/L Final   ]    Recent Labs   Lab Test 11/11/21  0913 08/13/20  1638 08/14/17  0907 10/20/14  1325   CHOL 137 134   < > 174   HDL 50 72   < > 70   LDL 72 50   < > 89   TRIG 74 61   < > 71   CHOLHDLRATIO  --   --   --  2.5    < > = values in this interval not displayed.       Lab Results   Component Value Date     11/11/2021     03/22/2021      Lab Results   Component Value Date    POTASSIUM 4.6 11/11/2021    POTASSIUM 4.1 03/22/2021     Lab Results   Component Value Date    CHLORIDE 106 11/11/2021    CHLORIDE 106 03/22/2021     Lab Results   Component Value Date    MICHELLE 8.5 11/11/2021    MICHELLE 8.7 03/22/2021     Lab Results   Component Value Date    CO2 22 11/11/2021    CO2 24 03/22/2021     Lab Results   Component Value Date    BUN 10 11/11/2021    BUN 12 03/22/2021     Lab Results   Component Value Date    CR 0.67 11/11/2021    CR 0.72 03/22/2021       GFR Estimate   Date Value Ref Range Status   11/11/2021 >90 >60 mL/min/1.73m2 Final     Comment:     As of July 11, 2021, eGFR is calculated by the CKD-EPI creatinine equation, without race adjustment. eGFR can be influenced by muscle mass, exercise, and diet. The reported eGFR is an estimation only and is only applicable if the renal function is stable.   03/22/2021 >90 >60  mL/min/[1.73_m2] Final     Comment:     Non  GFR Calc  Starting 12/18/2018, serum creatinine based estimated GFR (eGFR) will be   calculated using the Chronic Kidney Disease Epidemiology Collaboration   (CKD-EPI) equation.     08/13/2020 88 >60 mL/min/[1.73_m2] Final     Comment:     Non  GFR Calc  Starting 12/18/2018, serum creatinine based estimated GFR (eGFR) will be   calculated using the Chronic Kidney Disease Epidemiology Collaboration   (CKD-EPI) equation.     09/16/2019 >90 >60 mL/min/[1.73_m2] Final     Comment:     Non  GFR Calc  Starting 12/18/2018, serum creatinine based estimated GFR (eGFR) will be   calculated using the Chronic Kidney Disease Epidemiology Collaboration   (CKD-EPI) equation.       GFR Estimate If Black   Date Value Ref Range Status   03/22/2021 >90 >60 mL/min/[1.73_m2] Final     Comment:      GFR Calc  Starting 12/18/2018, serum creatinine based estimated GFR (eGFR) will be   calculated using the Chronic Kidney Disease Epidemiology Collaboration   (CKD-EPI) equation.     08/13/2020 >90 >60 mL/min/[1.73_m2] Final     Comment:      GFR Calc  Starting 12/18/2018, serum creatinine based estimated GFR (eGFR) will be   calculated using the Chronic Kidney Disease Epidemiology Collaboration   (CKD-EPI) equation.     09/16/2019 >90 >60 mL/min/[1.73_m2] Final     Comment:      GFR Calc  Starting 12/18/2018, serum creatinine based estimated GFR (eGFR) will be   calculated using the Chronic Kidney Disease Epidemiology Collaboration   (CKD-EPI) equation.         Lab Results   Component Value Date    MICROL 14 11/11/2021    MICROL 11 08/13/2020     No results found for: MICROALBUMIN  No results found for: CPEPT, GADAB, ISCAB    Vitamin B12   Date Value Ref Range Status   11/11/2021 244 193 - 986 pg/mL Final   ]    Most recent eye exam date: : Not Found   She had eye appointment 11/11/21.   Assessment    Assessment/Plan:      1.  Type 1 DM-   Coral's glucose is higher recently, likely due to increased pain and stress.  Last a1c was 6.6%, however she is anemic, so this may be falsely low. We made the following changes today (instructions given to patient):     Increase Lantus to 27 units daily.  If you start waking up low, back down to 25.     Consider In-pen [email or web address].     Increase Novolog to 3.5 units/carb (approximately 1/4g).      If you find you're still high, change correction to 1/20 mg/dL.    Pre-meal:   120- 1 unit  140- 2 units  160- 3 units  180- 4 units.     Bedtime:   160- 1 unit  180- 2 units  200- 3 units, etc.     Let me know if you start dropping low or if you remain high.      2.  Risk factors- BP historically under good control.  Creatinine normal.  No MA.  Lipids ok on statin. Had recent eye exam.     3.  F/U in 3 months with Dr. Ness.  Call sooner with concerns.      39 minutes spent on the date of the encounter doing chart review, review of test results, review of continuous glucose sensor, interpretation of glucose data, patient visit and documentation, counseling/coordination of care, and discussion of follow up plan for worsening hyper and hypoglycemia.  The patient understood and is satisfied with today's visit.       Melonie Coughlin PA-C, MPAS   [facility]  Department of Medicine  Division of Endocrinology and Diabetes

## 2021-11-29 NOTE — PATIENT INSTRUCTIONS
Nice seeing you today, Coral!     Increase Lantus to 27 units daily.  If you start waking up low, back down to 25.     Consider In-pen [email or web address].     Increase Novolog to 3.5 units/carb (approximately 1/4g).      If you find you're still high, change correction to 1/20 mg/dL.    Pre-meal:   120- 1 unit  140- 2 units  160- 3 units  180- 4 units.     Bedtime:   160- 1 unit  180- 2 units  200- 3 units, etc.     Let me know if you start dropping low or if you remain high.

## 2022-01-04 ENCOUNTER — TELEPHONE (OUTPATIENT)
Dept: ENDOCRINOLOGY | Facility: CLINIC | Age: 52
End: 2022-01-04

## 2022-01-04 DIAGNOSIS — E10.9 TYPE 1 DIABETES MELLITUS WITHOUT COMPLICATION (H): Primary | ICD-10-CM

## 2022-01-04 RX ORDER — INSULIN ASPART 100 [IU]/ML
INJECTION, SOLUTION INTRAVENOUS; SUBCUTANEOUS
Qty: 75 ML | Refills: 1 | Status: SHIPPED | OUTPATIENT
Start: 2022-01-04 | End: 2023-02-20

## 2022-01-04 NOTE — TELEPHONE ENCOUNTER
Formulary Change.     RE    May a detailed message be left on voicemail: yes      Reason for Call: Medication Question or concern regarding medication   Prescription Clarification  Name of Medication: HUMALOG KWIKPEN 100 UNIT/ML soln  Prescribing Provider: Melonie Coughlin              [facility] [place] - [id number]              What on the order needs clarification?      Pt called in stating that insurance is no longer covering Humalog and that they will now cover Novalog. Pt is requesting new prescription of Novalog be sent to the Pharmacy listed above. Pt says she already discussed with provider about the change in medication.

## 2022-01-04 NOTE — TELEPHONE ENCOUNTER
[facility] Call Center    Phone Message    May a detailed message be left on voicemail: yes     Reason for Call: Medication Question or concern regarding medication   Prescription Clarification  Name of Medication: HUMALOG KWIKPEN 100 UNIT/ML soln  Prescribing Provider: Melonie Coughlin   [facility] [place] - [id number] [facility]   What on the order needs clarification?     Pt called in stating that insurance is no longer covering Humalog and that they will now cover Novalog. Pt is requesting new prescription of Novalog be sent to the Pharmacy listed above. Pt says she already discussed with provider about the change in medication.           Action Taken: Other: Endo    Travel Screening: Not Applicable

## 2022-02-06 ENCOUNTER — MYC MEDICAL ADVICE (OUTPATIENT)
Dept: ENDOCRINOLOGY | Facility: CLINIC | Age: 52
End: 2022-02-06

## 2022-02-06 DIAGNOSIS — E10.65 TYPE 1 DIABETES MELLITUS WITH HYPERGLYCEMIA (H): Primary | ICD-10-CM

## 2022-02-09 ENCOUNTER — TELEPHONE (OUTPATIENT)
Dept: ENDOCRINOLOGY | Facility: CLINIC | Age: 52
End: 2022-02-09

## 2022-02-09 DIAGNOSIS — E10.9 TYPE 1 DIABETES MELLITUS WITHOUT COMPLICATION (H): Primary | ICD-10-CM

## 2022-02-09 NOTE — TELEPHONE ENCOUNTER
[facility] Call Center    Phone Message    May a detailed message be left on voicemail: yes     Reason for Call: Order(s): Other:     Reason for requested: Per Patient is wanting to have an order for A1C put in. Patient had tried to schedule but is needing the order put in. Please advise    Patient is needing the order put in ASAP.     Date needed: asap    Provider name: Ced      Action Taken: Message routed to:  [facility] Endo    Travel Screening: Not Applicable

## 2022-02-14 NOTE — TELEPHONE ENCOUNTER
Patient is needing order to be put in for A1C. Patient called to put in the request on 2/9/2022 for orders after trying to schedule and no orders have been placed. Patient is wanting to get a call back. Please advise.

## 2022-02-21 ENCOUNTER — LAB (OUTPATIENT)
Dept: LAB | Facility: CLINIC | Age: 52
End: 2022-02-21

## 2022-02-21 DIAGNOSIS — E10.65 TYPE 1 DIABETES MELLITUS WITH HYPERGLYCEMIA (H): ICD-10-CM

## 2022-02-21 LAB — HBA1C MFR BLD: 7.2 % (ref 0–5.6)

## 2022-02-21 PROCEDURE — 36415 COLL VENOUS BLD VENIPUNCTURE: CPT

## 2022-02-21 PROCEDURE — 83036 HEMOGLOBIN GLYCOSYLATED A1C: CPT

## 2022-02-22 NOTE — PROGRESS NOTES
Outcome for 02/22/22 12:08 PM: [employer] message sent  Majo Price CAMILLA  Outcome for 02/24/22 3:10 PM: Left Voicemail    Hyacinth Chávez on 2/24/2022 at 3:10 PM  Outcome for 02/25/22 9:46 AM: [employer] Mobile message sent  CAMILLA Bonilla  is being evaluated via a billable video visit.      How would you like to obtain your AVS? Insuritastania  For the video visit, send the invitation by: Send to e-mail at: [email or web address]  Will anyone else be joining your video visit? No      Patient denies any changes since echeck-in regarding medication and allergies and states all information entered during echeck-in remains accurate.    Start time: 1238  End time: 1302    HPI:  Ms. Painting is a 52 yo woman here for follow up of type 1 diabetes.  She also sees Dr. Ness. At her last visit, she had been struggling with very heavy menstrual bleeding and pain due to a very large fibroid. At that time, her glucose was running much higher, but has improved considerably since she was able to manage this issue medically.  She is glad to know she does not need to have a hysterectomy at this point.      Recently, Coral started having more low blood sugars, so she backed off her Lantus from 27 units to 24 units. She then started waking up high, so she recently increased it back to 25 units in the last couple of days.  She feels things are getting better now. She takes Novolog 3 units/15g CHO with meals plus correction of 1/25 over 175 mg/dL.  She feels like she is having to do this more often.  We previously talked about the In-pen, but she has not looked into it yet.  She continues using the dexcom sensor.     Recent glucose is as follows:     Overall average glucose for the past 2 weeks is 169 mg/dL (CV 32%).                  She had a recent eye exam with no changes, per her report.  She continues on simvastatin.     She has no other concerns today.        ROS   GENERAL: weight is stable.  No fevers, chills,  malaise, night sweats.   HEENT: no dysphagia, diplopia, neck pain or tenderness, dry/scratchy eyes, URI, cough, sinus drainage, tinnitus, sinus pressure  CV: no chest pain, pressure, palpitations, skipped beats, LOC  LUNGS: no SOB, MCGOVERN, cough, sputum production, wheezing   ABDOMEN: no diarrhea, constipation, abdominal pain  EXTREMITIES: no rashes, ulcers, edema.  Frozen shoulder better.    NEUROLOGY: no changes in vision, tingling or numbness in hands or feet.   MSK: no muscle aches or pains, weakness      Personal Hx   Is a .  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  Drinks socially once a week or so.    Past Medical History  Dyslipidemia  Type I Diabetes Mellitus in her late 20's. Initially diagnosed as type 2, then found to have antibody positivity.    Physical Exam   There were no vitals taken for this visit.  GENERAL: healthy, alert and no distress  RESP: no audible wheeze, cough, or visible cyanosis.  No visible retractions or increased work of breathing.  Able to speak fully in complete sentences.  PSYCH: mentation appears normal, affect normal/bright, judgement and insight intact, normal speech and appearance well-groomed    RESULTS  Lab Results   Component Value Date    A1C 7.2 (H) 02/21/2022    A1C 6.6 (H) 11/11/2021    A1C 8.1 (H) 03/22/2021    A1C 7.0 (H) 08/13/2020    A1C 7.7 (H) 11/06/2017    A1C 6.6 (A) 08/05/2013    A1C 7.5 (A) 02/04/2013    HEMOGLOBINA1 6.3 (A) 01/21/2020    HEMOGLOBINA1 7.1 (A) 09/16/2019    HEMOGLOBINA1 6.6 (A) 06/03/2019    HEMOGLOBINA1 7.2 (A) 08/20/2018    HEMOGLOBINA1 6.9 (A) 05/07/2018       TSH   Date Value Ref Range Status   08/13/2020 2.05 0.40 - 4.00 mU/L Final   09/16/2019 2.29 0.40 - 4.00 mU/L Final   08/14/2017 2.17 0.40 - 4.00 mU/L Final   10/24/2016 2.80 0.40 - 4.00 mU/L Final   08/13/2007 1.86 0.4 - 5.0 mU/L Final     T4 Free   Date Value Ref Range Status   08/13/2007 1.02 0.70 - 1.85  ng/dL Final       ALT   Date Value Ref Range Status   09/16/2019 15 0 - 50 U/L Final   06/04/2012 12 0 - 50 U/L Final   ]    Recent Labs   Lab Test 11/11/21  0913 08/13/20  1638 08/14/17  0907 10/20/14  1325   CHOL 137 134   < > 174   HDL 50 72   < > 70   LDL 72 50   < > 89   TRIG 74 61   < > 71   CHOLHDLRATIO  --   --   --  2.5    < > = values in this interval not displayed.       Lab Results   Component Value Date     11/11/2021     03/22/2021      Lab Results   Component Value Date    POTASSIUM 4.6 11/11/2021    POTASSIUM 4.1 03/22/2021     Lab Results   Component Value Date    CHLORIDE 106 11/11/2021    CHLORIDE 106 03/22/2021     Lab Results   Component Value Date    MICHELLE 8.5 11/11/2021    MICHELLE 8.7 03/22/2021     Lab Results   Component Value Date    CO2 22 11/11/2021    CO2 24 03/22/2021     Lab Results   Component Value Date    BUN 10 11/11/2021    BUN 12 03/22/2021     Lab Results   Component Value Date    CR 0.67 11/11/2021    CR 0.72 03/22/2021       GFR Estimate   Date Value Ref Range Status   11/11/2021 >90 >60 mL/min/1.73m2 Final     Comment:     As of July 11, 2021, eGFR is calculated by the CKD-EPI creatinine equation, without race adjustment. eGFR can be influenced by muscle mass, exercise, and diet. The reported eGFR is an estimation only and is only applicable if the renal function is stable.   03/22/2021 >90 >60 mL/min/[1.73_m2] Final     Comment:     Non  GFR Calc  Starting 12/18/2018, serum creatinine based estimated GFR (eGFR) will be   calculated using the Chronic Kidney Disease Epidemiology Collaboration   (CKD-EPI) equation.     08/13/2020 88 >60 mL/min/[1.73_m2] Final     Comment:     Non  GFR Calc  Starting 12/18/2018, serum creatinine based estimated GFR (eGFR) will be   calculated using the Chronic Kidney Disease Epidemiology Collaboration   (CKD-EPI) equation.     09/16/2019 >90 >60 mL/min/[1.73_m2] Final     Comment:     Non   GFR Calc  Starting 12/18/2018, serum creatinine based estimated GFR (eGFR) will be   calculated using the Chronic Kidney Disease Epidemiology Collaboration   (CKD-EPI) equation.       GFR Estimate If Black   Date Value Ref Range Status   03/22/2021 >90 >60 mL/min/[1.73_m2] Final     Comment:      GFR Calc  Starting 12/18/2018, serum creatinine based estimated GFR (eGFR) will be   calculated using the Chronic Kidney Disease Epidemiology Collaboration   (CKD-EPI) equation.     08/13/2020 >90 >60 mL/min/[1.73_m2] Final     Comment:      GFR Calc  Starting 12/18/2018, serum creatinine based estimated GFR (eGFR) will be   calculated using the Chronic Kidney Disease Epidemiology Collaboration   (CKD-EPI) equation.     09/16/2019 >90 >60 mL/min/[1.73_m2] Final     Comment:      GFR Calc  Starting 12/18/2018, serum creatinine based estimated GFR (eGFR) will be   calculated using the Chronic Kidney Disease Epidemiology Collaboration   (CKD-EPI) equation.         Lab Results   Component Value Date    MICROL 14 11/11/2021    MICROL 11 08/13/2020     No results found for: MICROALBUMIN  No results found for: CPEPT, GADAB, ISCAB    Vitamin B12   Date Value Ref Range Status   11/11/2021 244 193 - 986 pg/mL Final   ]    Most recent eye exam date: : Not Found   She had eye appointment 11/11/21.   Assessment   Assessment/Plan:      1.  Type 1 DM-   Coral's a1c has climbed to 7.2%.  Her recent glucose is a bit higher.  We discussed ways of making her diabetes management easier, including an In-pen bluetooth insulin pen for dosing calculations or a tubeless Omnipod pump.  She is interested in learning more about both, but she is unsure if she wants to make any changes at this time.  I have placed a referral to diabetes education to review options.  She will schedule at her convenience.  For no, we discussed the importance of taking her insulin 15 minutes before eating.     2.  Risk  factors- BP historically under good control.  Creatinine normal.  No MA.  Lipids ok on statin. Had recent eye exam.     3.  F/U in 3 months with Dr. Ness.  Call sooner with concerns.      39 minutes spent on the date of the encounter doing chart review, review of test results, review of continuous glucose sensor, interpretation of glucose data, patient visit and documentation, counseling/coordination of care, and discussion of follow up plan for worsening hyper and hypoglycemia.  The patient understood and is satisfied with today's visit.       Melonie Coughlin PA-C, MPAS   [facility]  Department of Medicine  Division of Endocrinology and Diabetes

## 2022-02-28 ENCOUNTER — VIRTUAL VISIT (OUTPATIENT)
Dept: ENDOCRINOLOGY | Facility: CLINIC | Age: 52
End: 2022-02-28
Payer: COMMERCIAL

## 2022-02-28 DIAGNOSIS — E10.9 WELL CONTROLLED TYPE 1 DIABETES MELLITUS (H): Primary | ICD-10-CM

## 2022-02-28 PROCEDURE — 99214 OFFICE O/P EST MOD 30 MIN: CPT | Mod: 95 | Performed by: PHYSICIAN ASSISTANT

## 2022-02-28 NOTE — PATIENT INSTRUCTIONS
[email or web address]    Consider the Omnipod pump     Schedule an appointment in diabetes education to review options.      Please reach out to me if you would like me to order either of these.      Please work on taking your insulin 15 minutes before eating.  This will prevent your glucose from going too high after you eat.      No changes in dosing today.      If you have concerns, please send me a [employer] message M-Th, call the clinic at [phone], or call [phone] after hours/weekends and ask to speak with the endocrinologist on call.      Take careMelonie

## 2022-02-28 NOTE — LETTER
2/28/2022       RE: Coral Painting  [address]  [facility] [address]     Dear Colleague,    Thank you for referring your patient, Coral Painting, to the [facility] [place] at [facility]. Please see a copy of my visit note below.    Outcome for 02/22/22 12:08 PM: [employer] message sent  CAMILLA Bonilla  Outcome for 02/24/22 3:10 PM: Left Voicemail    Hyacinth Chávez on 2/24/2022 at 3:10 PM  Outcome for 02/25/22 9:46 AM: [employer] message sent  CAMILLA Bonilla    Coral Painting  is being evaluated via a billable video visit.      How would you like to obtain your AVS? [employer]  For the video visit, send the invitation by: Send to e-mail at: [email or web address]  Will anyone else be joining your video visit? No      Patient denies any changes since echeck-in regarding medication and allergies and states all information entered during echeck-in remains accurate.    Start time: 1238  End time: 1302    HPI:  Ms. Painting is a 52 yo woman here for follow up of type 1 diabetes.  She also sees Dr. Ness. At her last visit, she had been struggling with very heavy menstrual bleeding and pain due to a very large fibroid. At that time, her glucose was running much higher, but has improved considerably since she was able to manage this issue medically.  She is glad to know she does not need to have a hysterectomy at this point.      Recently, Coral started having more low blood sugars, so she backed off her Lantus from 27 units to 24 units. She then started waking up high, so she recently increased it back to 25 units in the last couple of days.  She feels things are getting better now. She takes Novolog 3 units/15g CHO with meals plus correction of 1/25 over 175 mg/dL.  She feels like she is having to do this more often.  We previously talked about the In-pen, but she has not looked into it yet.  She continues  using the dexcom sensor.     Recent glucose is as follows:     Overall average glucose for the past 2 weeks is 169 mg/dL (CV 32%).                  She had a recent eye exam with no changes, per her report.  She continues on simvastatin.     She has no other concerns today.        ROS   GENERAL: weight is stable.  No fevers, chills, malaise, night sweats.   HEENT: no dysphagia, diplopia, neck pain or tenderness, dry/scratchy eyes, URI, cough, sinus drainage, tinnitus, sinus pressure  CV: no chest pain, pressure, palpitations, skipped beats, LOC  LUNGS: no SOB, MCGOVERN, cough, sputum production, wheezing   ABDOMEN: no diarrhea, constipation, abdominal pain  EXTREMITIES: no rashes, ulcers, edema.  Frozen shoulder better.    NEUROLOGY: no changes in vision, tingling or numbness in hands or feet.   MSK: no muscle aches or pains, weakness      Personal Hx   Is a .  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  Drinks socially once a week or so.    Past Medical History  Dyslipidemia  Type I Diabetes Mellitus in her late 20's. Initially diagnosed as type 2, then found to have antibody positivity.    Physical Exam   There were no vitals taken for this visit.  GENERAL: healthy, alert and no distress  RESP: no audible wheeze, cough, or visible cyanosis.  No visible retractions or increased work of breathing.  Able to speak fully in complete sentences.  PSYCH: mentation appears normal, affect normal/bright, judgement and insight intact, normal speech and appearance well-groomed    RESULTS  Lab Results   Component Value Date    A1C 7.2 (H) 02/21/2022    A1C 6.6 (H) 11/11/2021    A1C 8.1 (H) 03/22/2021    A1C 7.0 (H) 08/13/2020    A1C 7.7 (H) 11/06/2017    A1C 6.6 (A) 08/05/2013    A1C 7.5 (A) 02/04/2013    HEMOGLOBINA1 6.3 (A) 01/21/2020    HEMOGLOBINA1 7.1 (A) 09/16/2019    HEMOGLOBINA1 6.6 (A) 06/03/2019    HEMOGLOBINA1 7.2 (A) 08/20/2018    HEMOGLOBINA1  6.9 (A) 05/07/2018       TSH   Date Value Ref Range Status   08/13/2020 2.05 0.40 - 4.00 mU/L Final   09/16/2019 2.29 0.40 - 4.00 mU/L Final   08/14/2017 2.17 0.40 - 4.00 mU/L Final   10/24/2016 2.80 0.40 - 4.00 mU/L Final   08/13/2007 1.86 0.4 - 5.0 mU/L Final     T4 Free   Date Value Ref Range Status   08/13/2007 1.02 0.70 - 1.85 ng/dL Final       ALT   Date Value Ref Range Status   09/16/2019 15 0 - 50 U/L Final   06/04/2012 12 0 - 50 U/L Final   ]    Recent Labs   Lab Test 11/11/21  0913 08/13/20  1638 08/14/17  0907 10/20/14  1325   CHOL 137 134   < > 174   HDL 50 72   < > 70   LDL 72 50   < > 89   TRIG 74 61   < > 71   CHOLHDLRATIO  --   --   --  2.5    < > = values in this interval not displayed.       Lab Results   Component Value Date     11/11/2021     03/22/2021      Lab Results   Component Value Date    POTASSIUM 4.6 11/11/2021    POTASSIUM 4.1 03/22/2021     Lab Results   Component Value Date    CHLORIDE 106 11/11/2021    CHLORIDE 106 03/22/2021     Lab Results   Component Value Date    MICHELLE 8.5 11/11/2021    MICHELLE 8.7 03/22/2021     Lab Results   Component Value Date    CO2 22 11/11/2021    CO2 24 03/22/2021     Lab Results   Component Value Date    BUN 10 11/11/2021    BUN 12 03/22/2021     Lab Results   Component Value Date    CR 0.67 11/11/2021    CR 0.72 03/22/2021       GFR Estimate   Date Value Ref Range Status   11/11/2021 >90 >60 mL/min/1.73m2 Final     Comment:     As of July 11, 2021, eGFR is calculated by the CKD-EPI creatinine equation, without race adjustment. eGFR can be influenced by muscle mass, exercise, and diet. The reported eGFR is an estimation only and is only applicable if the renal function is stable.   03/22/2021 >90 >60 mL/min/[1.73_m2] Final     Comment:     Non  GFR Calc  Starting 12/18/2018, serum creatinine based estimated GFR (eGFR) will be   calculated using the Chronic Kidney Disease Epidemiology Collaboration   (CKD-EPI) equation.      08/13/2020 88 >60 mL/min/[1.73_m2] Final     Comment:     Non  GFR Calc  Starting 12/18/2018, serum creatinine based estimated GFR (eGFR) will be   calculated using the Chronic Kidney Disease Epidemiology Collaboration   (CKD-EPI) equation.     09/16/2019 >90 >60 mL/min/[1.73_m2] Final     Comment:     Non  GFR Calc  Starting 12/18/2018, serum creatinine based estimated GFR (eGFR) will be   calculated using the Chronic Kidney Disease Epidemiology Collaboration   (CKD-EPI) equation.       GFR Estimate If Black   Date Value Ref Range Status   03/22/2021 >90 >60 mL/min/[1.73_m2] Final     Comment:      GFR Calc  Starting 12/18/2018, serum creatinine based estimated GFR (eGFR) will be   calculated using the Chronic Kidney Disease Epidemiology Collaboration   (CKD-EPI) equation.     08/13/2020 >90 >60 mL/min/[1.73_m2] Final     Comment:      GFR Calc  Starting 12/18/2018, serum creatinine based estimated GFR (eGFR) will be   calculated using the Chronic Kidney Disease Epidemiology Collaboration   (CKD-EPI) equation.     09/16/2019 >90 >60 mL/min/[1.73_m2] Final     Comment:      GFR Calc  Starting 12/18/2018, serum creatinine based estimated GFR (eGFR) will be   calculated using the Chronic Kidney Disease Epidemiology Collaboration   (CKD-EPI) equation.         Lab Results   Component Value Date    MICROL 14 11/11/2021    MICROL 11 08/13/2020     No results found for: MICROALBUMIN  No results found for: CPEPT, GADAB, ISCAB    Vitamin B12   Date Value Ref Range Status   11/11/2021 244 193 - 986 pg/mL Final   ]    Most recent eye exam date: : Not Found   She had eye appointment 11/11/21.   Assessment   Assessment/Plan:      1.  Type 1 DM-   Coral's a1c has climbed to 7.2%.  Her recent glucose is a bit higher.  We discussed ways of making her diabetes management easier, including an In-pen bluetooth insulin pen for dosing calculations or a  tubeless Omnipod pump.  She is interested in learning more about both, but she is unsure if she wants to make any changes at this time.  I have placed a referral to diabetes education to review options.  She will schedule at her convenience.  For no, we discussed the importance of taking her insulin 15 minutes before eating.     2.  Risk factors- BP historically under good control.  Creatinine normal.  No MA.  Lipids ok on statin. Had recent eye exam.     3.  F/U in 3 months with Dr. Ness.  Call sooner with concerns.      39 minutes spent on the date of the encounter doing chart review, review of test results, review of continuous glucose sensor, interpretation of glucose data, patient visit and documentation, counseling/coordination of care, and discussion of follow up plan for worsening hyper and hypoglycemia.  The patient understood and is satisfied with today's visit.       Melonie Coughlin PA-C, MPAS   [facility]  Department of Medicine  Division of Endocrinology and Diabetes

## 2022-03-12 ENCOUNTER — HEALTH MAINTENANCE LETTER (OUTPATIENT)
Age: 52
End: 2022-03-12

## 2022-05-13 ENCOUNTER — OFFICE VISIT (OUTPATIENT)
Dept: URGENT CARE | Facility: URGENT CARE | Age: 52
End: 2022-05-13
Payer: COMMERCIAL

## 2022-05-13 VITALS
HEART RATE: 86 BPM | OXYGEN SATURATION: 98 % | WEIGHT: 180 LBS | DIASTOLIC BLOOD PRESSURE: 84 MMHG | RESPIRATION RATE: 18 BRPM | HEIGHT: 60 IN | BODY MASS INDEX: 35.34 KG/M2 | SYSTOLIC BLOOD PRESSURE: 137 MMHG | TEMPERATURE: 98.6 F

## 2022-05-13 DIAGNOSIS — S46.912A STRAIN OF LEFT SHOULDER, INITIAL ENCOUNTER: Primary | ICD-10-CM

## 2022-05-13 PROCEDURE — 99203 OFFICE O/P NEW LOW 30 MIN: CPT | Performed by: FAMILY MEDICINE

## 2022-05-13 RX ORDER — CYCLOBENZAPRINE HCL 10 MG
10 TABLET ORAL 3 TIMES DAILY PRN
Qty: 30 TABLET | Refills: 0 | Status: SHIPPED | OUTPATIENT
Start: 2022-05-13 | End: 2023-05-08

## 2022-05-24 ENCOUNTER — LAB (OUTPATIENT)
Dept: LAB | Facility: CLINIC | Age: 52
End: 2022-05-24
Payer: COMMERCIAL

## 2022-05-24 DIAGNOSIS — E10.65 TYPE 1 DIABETES MELLITUS WITH HYPERGLYCEMIA (H): ICD-10-CM

## 2022-05-24 LAB — HBA1C MFR BLD: 7.3 % (ref 0–5.6)

## 2022-05-24 PROCEDURE — 36415 COLL VENOUS BLD VENIPUNCTURE: CPT

## 2022-05-24 PROCEDURE — 83036 HEMOGLOBIN GLYCOSYLATED A1C: CPT

## 2022-05-24 NOTE — PROGRESS NOTES
Coral Painting  is being evaluated via a billable video visit.      How would you like to obtain your AVS? [employer]  For the video visit, send the invitation by: Send to e-mail at: [email or web address]  Will anyone else be joining your video visit? No      Outcome for 05/24/22 2:30 PM: [employer] message sent  CAMILLA Bonilla  Outcome for 05/26/22 2:16 PM: Dexcom emailed to provider  CAMILLA Alonso     This 51-year-old woman with longstanding type 1 diabetes was seen in follow-up.  She is comanaged with Melonie Coughlin who saw her a few months ago.  She currently is taking Lantus 25 units at about 9:30 in the evening as well as NovoLog 3 units for each 15 g of carb.  She wears a Dexcom sensor and the data are below.  Overall she reports she is doing pretty well.  She is frustrated that about half the time she thinks she is above target overnight without any difference in how she manages her sugars.  She also think she is more variable during the day than she should be.  She admits that she tries to take her Lantus at the same time every night and usually does but often misses giving her mealtime insulin 15 minutes before meal.  She is not having any problems with hypoglycemia.  The sensor always lets her know when she is approaching a low and she manages it by just ingesting a small amount of carbohydrate.  She is walking for exercise but admits she is working at home and not getting as much exercise as she should.  Overall she feels okay.  She did develop abdominal pain and vaginal bleeding from a fibroid earlier this year.  This is being treated medically with good success.  She is also developed some pain in her left shoulder and neck.  She saw her primary care doctor and may go back to see them again if the symptoms do not clear..  She denies chest pain or shortness of breath.  She reports she is gained some weight through COVID.  She has no problems with abdominal pain, GI or  function.      She  saw her eye doctor in November 2021.  There was no evidence of retinopathy on that exam.  She has no neuropathy in her feet.  She denies having any foot ulcers.                Patient Active Problem List   Diagnosis     Type 1 diabetes mellitus without complication (H)     Type 1 Diabetes Mellitus     Current Outpatient Medications   Medication     blood glucose (NO BRAND SPECIFIED) test strip     blood glucose (ONETOUCH ULTRA) test strip     blood glucose monitoring (NO BRAND SPECIFIED) meter device kit     blood glucose monitoring (SOFTCLIX) lancets     Continuous Blood Gluc  (DEXCOM G6 ) SUJEY     Continuous Blood Gluc Sensor (DEXCOM G6 SENSOR) MISC     Continuous Blood Gluc Transmit (DEXCOM G6 TRANSMITTER) MISC     cyclobenzaprine (FLEXERIL) 10 MG tablet     HUMALOG KWIKPEN 100 UNIT/ML soln     insulin glargine (LANTUS SOLOSTAR) 100 UNIT/ML pen     insulin pen needle (B-D U/F) 31G X 8 MM miscellaneous     insulin pen needle 31G X 8 MM     Lancets Misc. (ACCU-CHEK SOFTCLIX LANCET DEV) KIT     NOVOLOG FLEXPEN 100 UNIT/ML soln     simvastatin (ZOCOR) 40 MG tablet     No current facility-administered medications for this visit.             1203 5/13/2022   1703 Most Recent Value     Temp: -- 98.6  F (37  C) 98.6  F (37  C)  as of 5/13/2022    Pulse: -- 86 86  as of 5/13/2022    BP: -- 137/84 137/84  as of 5/13/2022    Resp: -- 18 18  as of 5/13/2022    SpO2: -- 98 % 98%  as of 5/13/2022    Body Mass Index:   None    Height: -- 1.524 m (5') 1.524 m (5')  as of 5/13/2022    Weight: -- 81.6 kg (180 lb) 81.6 kg (180 lb)  as of 5/13/2022      On exam she is in no acute distress.  Cranial nerves are grossly intact.  Mood is neutral.        Recent Labs   Lab Test 05/24/22  1454 02/21/22  0916 11/11/21  0913 11/11/21  0913 03/22/21  1102 08/13/20  1645 08/13/20  1638 01/21/20  0000 09/16/19  0820 09/16/19 0818 09/16/19  0000   A1C 7.3* 7.2*   < > 6.6* 8.1*  --  7.0*  --   --   --   --    HEMOGLOBINA1  --    --   --   --   --   --   --  6.3*  --   --  7.1*   TSH  --   --   --   --   --   --  2.05  --  2.29  --   --    LDL  --   --   --  72  --   --  50  --  80  --   --    HDL  --   --   --  50  --   --  72  --  69  --   --    TRIG  --   --   --  74  --   --  61  --  74  --   --    CR  --   --   --  0.67 0.72  --  0.78  --  0.68  --   --    MICROL  --   --   --  14  --  11  --   --   --    < >  --     < > = values in this interval not displayed.   Assessment and plan:    1.  Diabetes control.  Her A1c is close to target and she is not having hypoglycemia.  Some of her variability could be due to the use of Lantus as her basal insulin.  I suggested we try Tresiba or Toujeo since they have a longer half-life and are flatter in their pharmacokinetic pattern.  She just refilled her Lantus but she would like to make the switch next time she gets insulin.  She is also having trouble giving insulin before her meal.  I suggested we try Fiasp and see if she has more success with postprandial control.  She will let me know when it is time to order more prandial insulin so I can start this new drug for her.  I made no changes in her doses today.  We did discuss hybrid closed-loop pumps but she is not interested in wearing another device.    2.  Diabetes complications.  She is up-to-date with her eye visits and has no retinopathy.  She has no symptoms of neuropathy.  Her renal function is normal.    3.CVD risk.  Her blood pressure was normal earlier this year.  Her LDL is at target and she is on Zocor.    F/u with Melonie Coughlin in 3 mo and me in 6 months    I spent 20 minutes on the video visit with the patient (start time 3: 3 0 and end time 3: 5 0).  On the day of visit I spent an additional 20 minutes reviewing and interpreting her CGM data, reviewing and interpreting her labs, and doing documentation.    Henna Ness MD

## 2022-05-31 ENCOUNTER — VIRTUAL VISIT (OUTPATIENT)
Dept: ENDOCRINOLOGY | Facility: CLINIC | Age: 52
End: 2022-05-31
Payer: COMMERCIAL

## 2022-05-31 DIAGNOSIS — E10.9 TYPE 1 DIABETES MELLITUS WITHOUT COMPLICATION (H): Primary | ICD-10-CM

## 2022-05-31 PROCEDURE — 99215 OFFICE O/P EST HI 40 MIN: CPT | Mod: 95 | Performed by: INTERNAL MEDICINE

## 2022-05-31 NOTE — PATIENT INSTRUCTIONS
Let me know when it is time to renew your insulin prescriptions so I can order the Tresiba and Fiasp.

## 2022-05-31 NOTE — NURSING NOTE
Patient denies any changes since echeck-in regarding medication and allergies and states all information entered during echeck-in remains accurate.  Hyacinth Chávez on 5/31/2022 at 3:21 PM

## 2022-05-31 NOTE — LETTER
5/31/2022       RE: Coral Painting  [address]  [facility] [address]     Dear Colleague,    Thank you for referring your patient, Coral Painting, to the [facility]CRINOLOG[facility] [place] at [facility]. Please see a copy of my visit note below.    Coral Painting  is being evaluated via a billable video visit.      How would you like to obtain your AVS? [employer]  For the video visit, send the invitation by: Send to e-mail at: [email or web address]  Will anyone else be joining your video visit? No      Outcome for 05/24/22 2:30 PM: [employer] message sent  CAIMLLA Bonilla  Outcome for 05/26/22 2:16 PM: Dexcom emailed to provider  CAMILLA Alonso     This 51-year-old woman with longstanding type 1 diabetes was seen in follow-up.  She is comanaged with Melonie Coughlin who saw her a few months ago.  She currently is taking Lantus 25 units at about 9:30 in the evening as well as NovoLog 3 units for each 15 g of carb.  She wears a Dexcom sensor and the data are below.  Overall she reports she is doing pretty well.  She is frustrated that about half the time she thinks she is above target overnight without any difference in how she manages her sugars.  She also think she is more variable during the day than she should be.  She admits that she tries to take her Lantus at the same time every night and usually does but often misses giving her mealtime insulin 15 minutes before meal.  She is not having any problems with hypoglycemia.  The sensor always lets her know when she is approaching a low and she manages it by just ingesting a small amount of carbohydrate.  She is walking for exercise but admits she is working at home and not getting as much exercise as she should.  Overall she feels okay.  She did develop abdominal pain and vaginal bleeding from a fibroid earlier this year.  This is being treated medically with good success.   She is also developed some pain in her left shoulder and neck.  She saw her primary care doctor and may go back to see them again if the symptoms do not clear..  She denies chest pain or shortness of breath.  She reports she is gained some weight through COVID.  She has no problems with abdominal pain, GI or  function.      She saw her eye doctor in November 2021.  There was no evidence of retinopathy on that exam.  She has no neuropathy in her feet.  She denies having any foot ulcers.                Patient Active Problem List   Diagnosis     Type 1 diabetes mellitus without complication (H)     Type 1 Diabetes Mellitus     Current Outpatient Medications   Medication     blood glucose (NO BRAND SPECIFIED) test strip     blood glucose (ONETOUCH ULTRA) test strip     blood glucose monitoring (NO BRAND SPECIFIED) meter device kit     blood glucose monitoring (SOFTCLIX) lancets     Continuous Blood Gluc  (DEXCOM G6 ) SUJEY     Continuous Blood Gluc Sensor (DEXCOM G6 SENSOR) MISC     Continuous Blood Gluc Transmit (DEXCOM G6 TRANSMITTER) MISC     cyclobenzaprine (FLEXERIL) 10 MG tablet     HUMALOG KWIKPEN 100 UNIT/ML soln     insulin glargine (LANTUS SOLOSTAR) 100 UNIT/ML pen     insulin pen needle (B-D U/F) 31G X 8 MM miscellaneous     insulin pen needle 31G X 8 MM     Lancets Misc. (ACCU-CHEK SOFTCLIX LANCET DEV) KIT     NOVOLOG FLEXPEN 100 UNIT/ML soln     simvastatin (ZOCOR) 40 MG tablet     No current facility-administered medications for this visit.             1203 5/13/2022   1703 Most Recent Value     Temp: -- 98.6  F (37  C) 98.6  F (37  C)  as of 5/13/2022    Pulse: -- 86 86  as of 5/13/2022    BP: -- 137/84 137/84  as of 5/13/2022    Resp: -- 18 18  as of 5/13/2022    SpO2: -- 98 % 98%  as of 5/13/2022    Body Mass Index:   None    Height: -- 1.524 m (5') 1.524 m (5')  as of 5/13/2022    Weight: -- 81.6 kg (180 lb) 81.6 kg (180 lb)  as of 5/13/2022      On exam she is in no acute distress.   Cranial nerves are grossly intact.  Mood is neutral.        Recent Labs   Lab Test 05/24/22  1454 02/21/22  0916 11/11/21  0913 11/11/21  0913 03/22/21  1102 08/13/20  1645 08/13/20  1638 01/21/20  0000 09/16/19  0820 09/16/19  0818 09/16/19  0000   A1C 7.3* 7.2*   < > 6.6* 8.1*  --  7.0*  --   --   --   --    HEMOGLOBINA1  --   --   --   --   --   --   --  6.3*  --   --  7.1*   TSH  --   --   --   --   --   --  2.05  --  2.29  --   --    LDL  --   --   --  72  --   --  50  --  80  --   --    HDL  --   --   --  50  --   --  72  --  69  --   --    TRIG  --   --   --  74  --   --  61  --  74  --   --    CR  --   --   --  0.67 0.72  --  0.78  --  0.68  --   --    MICROL  --   --   --  14  --  11  --   --   --    < >  --     < > = values in this interval not displayed.   Assessment and plan:    1.  Diabetes control.  Her A1c is close to target and she is not having hypoglycemia.  Some of her variability could be due to the use of Lantus as her basal insulin.  I suggested we try Tresiba or Toujeo since they have a longer half-life and are flatter in their pharmacokinetic pattern.  She just refilled her Lantus but she would like to make the switch next time she gets insulin.  She is also having trouble giving insulin before her meal.  I suggested we try Fiasp and see if she has more success with postprandial control.  She will let me know when it is time to order more prandial insulin so I can start this new drug for her.  I made no changes in her doses today.  We did discuss hybrid closed-loop pumps but she is not interested in wearing another device.    2.  Diabetes complications.  She is up-to-date with her eye visits and has no retinopathy.  She has no symptoms of neuropathy.  Her renal function is normal.    3.CVD risk.  Her blood pressure was normal earlier this year.  Her LDL is at target and she is on Zocor.    F/u with Melonie Coughlin in 3 mo and me in 6 months    I spent 20 minutes on the video visit with the  patient (start time 3: 3 0 and end time 3: 5 0).  On the day of visit I spent an additional 20 minutes reviewing and interpreting her CGM data, reviewing and interpreting her labs, and doing documentation.    Henna Ness MD

## 2022-06-01 ENCOUNTER — TELEPHONE (OUTPATIENT)
Dept: ENDOCRINOLOGY | Facility: CLINIC | Age: 52
End: 2022-06-01

## 2022-06-01 NOTE — TELEPHONE ENCOUNTER
Attempted to reach patient to schedule follow up in the Endocrinology Clinic. No answer, LM on VM to call office back.    Schedule with Melonie Coughlin with 3 months and Dr. Ness in 6 months.Hyacinth Chávez on 6/1/2022 at 9:36 AM

## 2022-07-22 DIAGNOSIS — E10.9 WELL CONTROLLED TYPE 1 DIABETES MELLITUS (H): ICD-10-CM

## 2022-07-23 RX ORDER — PROCHLORPERAZINE 25 MG/1
SUPPOSITORY RECTAL
Qty: 9 EACH | Refills: 3 | Status: SHIPPED | OUTPATIENT
Start: 2022-07-23 | End: 2023-09-02

## 2022-07-23 NOTE — TELEPHONE ENCOUNTER
Continuous Blood Gluc Sensor (DEXCOM G6 SENSOR) MISC      5/31/2022  [facility] [place]     Henna Ness MD    Endocrinology, Diabetes, and Metabolism

## 2022-07-25 NOTE — PROGRESS NOTES
Coral is a 51 year old who is being evaluated via a billable video visit.      How would you like to obtain your AVS? [employer]  If the video visit is dropped, the invitation should be resent by: Send to e-mail at: [email or web address]  Will anyone else be joining your video visit? No      Outcome for 07/25/22 5:31 PM: [employer] message sent  CAMILLA Anaya     Start time: 0803  End time:     HPI:  Ms. Painting is a 50 yo woman here for follow up of type 1 diabetes.  She also sees Dr. Ness. Her glucose has been under much better control lately.  She has been working on taking her boluses 15 minutes ahead of her meals more often. She also switched from Lantus to Semglee and she feels like this has been helpful.  She has been doing a few walks here and there, but not much activity.  She is working from home exclusively.  She is currently taking Semglee 25 units daily.  She takes Novolog 3 units/15g CHO with meals plus correction of 1/25 over 175 mg/dL.  She sometimes backs off her her mealtime coverage a bit.   We previously talked about the In-pen, but she has not looked into it yet.  She continues using the dexcom sensor.     Recent glucose is as follows:     Overall average glucose for the past 2 weeks is 148 (down from 169 at last visit) mg/dL (CV 38%).                Typical diet:   Breakfast- cereal with banana (cheerios or grape nuts)  In the summer, she feels like she eats better.    Sometimes eats salads all week, more fruit in the summer.    Tuna sandwich, more pork lately. She doesn't like a lot of vegetables, mostly fruits.     She had a recent eye exam with no changes, per her report.  She continues on simvastatin.     She has no other concerns today.        ROS   GENERAL: weight is stable.  No fevers, chills, malaise, night sweats.   HEENT: no dysphagia, diplopia, neck pain or tenderness, dry/scratchy eyes, URI, cough, sinus drainage, tinnitus, sinus pressure  CV: no chest pain, pressure,  palpitations, skipped beats, LOC  LUNGS: no SOB, MCGOVERN, cough, sputum production, wheezing   ABDOMEN: no diarrhea, constipation, abdominal pain  EXTREMITIES: no rashes, ulcers, occasional edema.  Frozen shoulder better.    NEUROLOGY: no changes in vision, tingling or numbness in hands or feet.   MSK: no muscle aches or pains, weakness      Personal Hx   Is a .  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  Drinks socially once a week or so.    Past Medical History  Dyslipidemia  Type I Diabetes Mellitus in her late 20's. Initially diagnosed as type 2, then found to have antibody positivity.    Physical Exam   There were no vitals taken for this visit.  GENERAL: healthy, alert and no distress  RESP: no audible wheeze, cough, or visible cyanosis.  No visible retractions or increased work of breathing.  Able to speak fully in complete sentences.  PSYCH: mentation appears normal, affect normal/bright, judgement and insight intact, normal speech and appearance well-groomed    RESULTS  Lab Results   Component Value Date    A1C 7.3 (H) 05/24/2022    A1C 7.2 (H) 02/21/2022    A1C 6.6 (H) 11/11/2021    A1C 8.1 (H) 03/22/2021    A1C 7.0 (H) 08/13/2020    A1C 7.7 (H) 11/06/2017    A1C 6.6 (A) 08/05/2013    A1C 7.5 (A) 02/04/2013    HEMOGLOBINA1 6.3 (A) 01/21/2020    HEMOGLOBINA1 7.1 (A) 09/16/2019    HEMOGLOBINA1 6.6 (A) 06/03/2019    HEMOGLOBINA1 7.2 (A) 08/20/2018    HEMOGLOBINA1 6.9 (A) 05/07/2018       TSH   Date Value Ref Range Status   08/13/2020 2.05 0.40 - 4.00 mU/L Final   09/16/2019 2.29 0.40 - 4.00 mU/L Final   08/14/2017 2.17 0.40 - 4.00 mU/L Final   10/24/2016 2.80 0.40 - 4.00 mU/L Final   08/13/2007 1.86 0.4 - 5.0 mU/L Final     T4 Free   Date Value Ref Range Status   08/13/2007 1.02 0.70 - 1.85 ng/dL Final       ALT   Date Value Ref Range Status   09/16/2019 15 0 - 50 U/L Final   06/04/2012 12 0 - 50 U/L Final   ]    Recent Labs   Lab Test  11/11/21  0913 08/13/20  1638 08/14/17  0907 10/20/14  1325   CHOL 137 134   < > 174   HDL 50 72   < > 70   LDL 72 50   < > 89   TRIG 74 61   < > 71   CHOLHDLRATIO  --   --   --  2.5    < > = values in this interval not displayed.       Lab Results   Component Value Date     11/11/2021     03/22/2021      Lab Results   Component Value Date    POTASSIUM 4.6 11/11/2021    POTASSIUM 4.1 03/22/2021     Lab Results   Component Value Date    CHLORIDE 106 11/11/2021    CHLORIDE 106 03/22/2021     Lab Results   Component Value Date    MICHELLE 8.5 11/11/2021    MICHELLE 8.7 03/22/2021     Lab Results   Component Value Date    CO2 22 11/11/2021    CO2 24 03/22/2021     Lab Results   Component Value Date    BUN 10 11/11/2021    BUN 12 03/22/2021     Lab Results   Component Value Date    CR 0.67 11/11/2021    CR 0.72 03/22/2021       GFR Estimate   Date Value Ref Range Status   11/11/2021 >90 >60 mL/min/1.73m2 Final     Comment:     As of July 11, 2021, eGFR is calculated by the CKD-EPI creatinine equation, without race adjustment. eGFR can be influenced by muscle mass, exercise, and diet. The reported eGFR is an estimation only and is only applicable if the renal function is stable.   03/22/2021 >90 >60 mL/min/[1.73_m2] Final     Comment:     Non  GFR Calc  Starting 12/18/2018, serum creatinine based estimated GFR (eGFR) will be   calculated using the Chronic Kidney Disease Epidemiology Collaboration   (CKD-EPI) equation.     08/13/2020 88 >60 mL/min/[1.73_m2] Final     Comment:     Non  GFR Calc  Starting 12/18/2018, serum creatinine based estimated GFR (eGFR) will be   calculated using the Chronic Kidney Disease Epidemiology Collaboration   (CKD-EPI) equation.     09/16/2019 >90 >60 mL/min/[1.73_m2] Final     Comment:     Non  GFR Calc  Starting 12/18/2018, serum creatinine based estimated GFR (eGFR) will be   calculated using the Chronic Kidney Disease Epidemiology  Collaboration   (CKD-EPI) equation.       GFR Estimate If Black   Date Value Ref Range Status   03/22/2021 >90 >60 mL/min/[1.73_m2] Final     Comment:      GFR Calc  Starting 12/18/2018, serum creatinine based estimated GFR (eGFR) will be   calculated using the Chronic Kidney Disease Epidemiology Collaboration   (CKD-EPI) equation.     08/13/2020 >90 >60 mL/min/[1.73_m2] Final     Comment:      GFR Calc  Starting 12/18/2018, serum creatinine based estimated GFR (eGFR) will be   calculated using the Chronic Kidney Disease Epidemiology Collaboration   (CKD-EPI) equation.     09/16/2019 >90 >60 mL/min/[1.73_m2] Final     Comment:      GFR Calc  Starting 12/18/2018, serum creatinine based estimated GFR (eGFR) will be   calculated using the Chronic Kidney Disease Epidemiology Collaboration   (CKD-EPI) equation.         Lab Results   Component Value Date    MICROL 14 11/11/2021    MICROL 11 08/13/2020     No results found for: MICROALBUMIN  No results found for: CPEPT, GADAB, ISCAB    Vitamin B12   Date Value Ref Range Status   11/11/2021 244 193 - 986 pg/mL Final   ]    Most recent eye exam date: : Not Found     She had eye appointment 11/11/21.     Assessment   Assessment/Plan:      1.  Type 1 DM-   Coral is doing very well.  Glucose average is down to 148 mg/dL and her variability is lower.  We have previously talked about dm technologies that might be helpful including In-pen and Omnipod 5 pump, but she feels comfortable with her current treatment strategy now.  Encouraged good behavior changes.  Discussed heart healthy eating ideas and encouraged patient to increase consumption of fruits, vegetables and whole grains (high fiber diet).     2.  Risk factors- BP historically under good control.  Creatinine normal.  No MA.  Lipids ok on statin. Had recent eye exam. Will check a1c/tsh.      3.  F/U in 3 months with Dr. Ness.  Call sooner with concerns.      39 minutes  spent on the date of the encounter doing chart review, review of test results, review of continuous glucose sensor, interpretation of glucose data, patient visit and documentation, counseling/coordination of care, and discussion of follow up plan for worsening hyper and hypoglycemia.  The patient understood and is satisfied with today's visit.       Melonie Coughlin PA-C, MPAS   [facility]  Department of Medicine  Division of Endocrinology and Diabetes

## 2022-08-01 ENCOUNTER — VIRTUAL VISIT (OUTPATIENT)
Dept: ENDOCRINOLOGY | Facility: CLINIC | Age: 52
End: 2022-08-01
Payer: COMMERCIAL

## 2022-08-01 DIAGNOSIS — E10.9 TYPE 1 DIABETES MELLITUS WITHOUT COMPLICATION (H): Primary | ICD-10-CM

## 2022-08-01 PROCEDURE — 99214 OFFICE O/P EST MOD 30 MIN: CPT | Mod: 95 | Performed by: PHYSICIAN ASSISTANT

## 2022-08-01 NOTE — PATIENT INSTRUCTIONS
Consider doing 1/6g (back off from 1/5g)    Women should try to eat at least 21 to 25 grams of fiber a day, while men should aim for 30 to 38 grams a day along with plenty of water.    Here's a look at how much dietary fiber is found in some common foods. When buying packaged foods, check the Nutrition Facts label for fiber content. It can vary among brands.    Fruits     Serving size  Total fiber (grams)*  Avocado    1 cup   9.8   Raspberries    1 cup   8.0  Pear     1 medium  5.5  Apple, with skin   1 medium  4.5  Banana    1 medium  3.0  Orange    1 medium  3.0  Strawberries    1 cup   3.0    Vegetables    Serving size  Total fiber (grams)*  Green peas, boiled   1 cup   9.0  Broccoli, boiled   1 cup chopped   5.0  Turnip greens, boiled   1 cup   5.0  [place] sprouts, boiled  1 cup   4.0  Potato, with skin, baked  1 medium  4.0  Sweet corn, boiled   1 cup   3.5  Cauliflower, raw   1 cup chopped  2.0  Carrot, raw    1 medium  1.5    Grains     Serving size  Total fiber (grams)*  Spaghetti, whole-wheat, cooked 1 cup   6.0  Barley, pearled, cooked  1 cup   6.0  Bran flakes    3/4 cup  5.5  Quinoa, cooked   1 cup   5.0  Oat bran muffin   1 medium  5.0  Oatmeal, instant, cooked  1 cup   5.0  Popcorn, air-popped   3 cups   3.5  Brown rice, cooked   1 cup   3.5  Bread, whole-wheat   1 slice   2.0  Bread, rye    1 slice   2.0    Legumes, nuts and seeds  Serving size  Total fiber (grams)*  Split peas, boiled   1 cup   16.0  Lentils, boiled    1 cup   15.5  Black beans, boiled   1 cup   15.0  Baked beans, canned   1 cup   10.0  Ion seeds    1 ounce  10.0  Almonds    1 ounce (23 nuts) 3.5  Pistachios    1 ounce (49 nuts) 3.0  Sunflower kernels   1 ounce  3.0  *Rounded to nearest 0.5 gram.    Source: USDA National Nutrient Database

## 2022-08-01 NOTE — LETTER
8/1/2022       RE: Coral Painting  [address]  [place] [address]     Dear Colleague,    Thank you for referring your patient, Coral Painting, to the [facility] [place] [facility]. Please see a copy of my visit note below.    Coral is a 51 year old who is being evaluated via a billable video visit.      How would you like to obtain your AVS? [email or web address]  If the video visit is dropped, the invitation should be resent by: Send to e-mail at: [email or web address]  Will anyone else be joining your video visit? No      Outcome for 07/25/22 5:31 PM: [employer] message sent  CAMILLA Anaya     Start time: 0803  End time:     HPI:  Ms. Painting is a 50 yo woman here for follow up of type 1 diabetes.  She also sees Dr. Ness. Her glucose has been under much better control lately.  She has been working on taking her boluses 15 minutes ahead of her meals more often. She also switched from Lantus to Semglee and she feels like this has been helpful.  She has been doing a few walks here and there, but not much activity.  She is working from home exclusively.  She is currently taking Semglee 25 units daily.  She takes Novolog 3 units/15g CHO with meals plus correction of 1/25 over 175 mg/dL.  She sometimes backs off her her mealtime coverage a bit.   We previously talked about the In-pen, but she has not looked into it yet.  She continues using the dexcom sensor.     Recent glucose is as follows:     Overall average glucose for the past 2 weeks is 148 (down from 169 at last visit) mg/dL (CV 38%).                Typical diet:   Breakfast- cereal with banana (cheerios or grape nuts)  In the summer, she feels like she eats better.    Sometimes eats salads all week, more fruit in the summer.    Tuna sandwich, more pork lately. She doesn't like a lot of vegetables, mostly fruits.     She had a recent eye exam with no  changes, per her report.  She continues on simvastatin.     She has no other concerns today.        ROS   GENERAL: weight is stable.  No fevers, chills, malaise, night sweats.   HEENT: no dysphagia, diplopia, neck pain or tenderness, dry/scratchy eyes, URI, cough, sinus drainage, tinnitus, sinus pressure  CV: no chest pain, pressure, palpitations, skipped beats, LOC  LUNGS: no SOB, MCGOVERN, cough, sputum production, wheezing   ABDOMEN: no diarrhea, constipation, abdominal pain  EXTREMITIES: no rashes, ulcers, occasional edema.  Frozen shoulder better.    NEUROLOGY: no changes in vision, tingling or numbness in hands or feet.   MSK: no muscle aches or pains, weakness      Personal Hx   Is a .  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  Drinks socially once a week or so.    Past Medical History  Dyslipidemia  Type I Diabetes Mellitus in her late 20's. Initially diagnosed as type 2, then found to have antibody positivity.    Physical Exam   There were no vitals taken for this visit.  GENERAL: healthy, alert and no distress  RESP: no audible wheeze, cough, or visible cyanosis.  No visible retractions or increased work of breathing.  Able to speak fully in complete sentences.  PSYCH: mentation appears normal, affect normal/bright, judgement and insight intact, normal speech and appearance well-groomed    RESULTS  Lab Results   Component Value Date    A1C 7.3 (H) 05/24/2022    A1C 7.2 (H) 02/21/2022    A1C 6.6 (H) 11/11/2021    A1C 8.1 (H) 03/22/2021    A1C 7.0 (H) 08/13/2020    A1C 7.7 (H) 11/06/2017    A1C 6.6 (A) 08/05/2013    A1C 7.5 (A) 02/04/2013    HEMOGLOBINA1 6.3 (A) 01/21/2020    HEMOGLOBINA1 7.1 (A) 09/16/2019    HEMOGLOBINA1 6.6 (A) 06/03/2019    HEMOGLOBINA1 7.2 (A) 08/20/2018    HEMOGLOBINA1 6.9 (A) 05/07/2018       TSH   Date Value Ref Range Status   08/13/2020 2.05 0.40 - 4.00 mU/L Final   09/16/2019 2.29 0.40 - 4.00 mU/L Final    08/14/2017 2.17 0.40 - 4.00 mU/L Final   10/24/2016 2.80 0.40 - 4.00 mU/L Final   08/13/2007 1.86 0.4 - 5.0 mU/L Final     T4 Free   Date Value Ref Range Status   08/13/2007 1.02 0.70 - 1.85 ng/dL Final       ALT   Date Value Ref Range Status   09/16/2019 15 0 - 50 U/L Final   06/04/2012 12 0 - 50 U/L Final   ]    Recent Labs   Lab Test 11/11/21  0913 08/13/20  1638 08/14/17  0907 10/20/14  1325   CHOL 137 134   < > 174   HDL 50 72   < > 70   LDL 72 50   < > 89   TRIG 74 61   < > 71   CHOLHDLRATIO  --   --   --  2.5    < > = values in this interval not displayed.       Lab Results   Component Value Date     11/11/2021     03/22/2021      Lab Results   Component Value Date    POTASSIUM 4.6 11/11/2021    POTASSIUM 4.1 03/22/2021     Lab Results   Component Value Date    CHLORIDE 106 11/11/2021    CHLORIDE 106 03/22/2021     Lab Results   Component Value Date    MICHELLE 8.5 11/11/2021    MICHELLE 8.7 03/22/2021     Lab Results   Component Value Date    CO2 22 11/11/2021    CO2 24 03/22/2021     Lab Results   Component Value Date    BUN 10 11/11/2021    BUN 12 03/22/2021     Lab Results   Component Value Date    CR 0.67 11/11/2021    CR 0.72 03/22/2021       GFR Estimate   Date Value Ref Range Status   11/11/2021 >90 >60 mL/min/1.73m2 Final     Comment:     As of July 11, 2021, eGFR is calculated by the CKD-EPI creatinine equation, without race adjustment. eGFR can be influenced by muscle mass, exercise, and diet. The reported eGFR is an estimation only and is only applicable if the renal function is stable.   03/22/2021 >90 >60 mL/min/[1.73_m2] Final     Comment:     Non  GFR Calc  Starting 12/18/2018, serum creatinine based estimated GFR (eGFR) will be   calculated using the Chronic Kidney Disease Epidemiology Collaboration   (CKD-EPI) equation.     08/13/2020 88 >60 mL/min/[1.73_m2] Final     Comment:     Non  GFR Calc  Starting 12/18/2018, serum creatinine based estimated GFR  (eGFR) will be   calculated using the Chronic Kidney Disease Epidemiology Collaboration   (CKD-EPI) equation.     09/16/2019 >90 >60 mL/min/[1.73_m2] Final     Comment:     Non  GFR Calc  Starting 12/18/2018, serum creatinine based estimated GFR (eGFR) will be   calculated using the Chronic Kidney Disease Epidemiology Collaboration   (CKD-EPI) equation.       GFR Estimate If Black   Date Value Ref Range Status   03/22/2021 >90 >60 mL/min/[1.73_m2] Final     Comment:      GFR Calc  Starting 12/18/2018, serum creatinine based estimated GFR (eGFR) will be   calculated using the Chronic Kidney Disease Epidemiology Collaboration   (CKD-EPI) equation.     08/13/2020 >90 >60 mL/min/[1.73_m2] Final     Comment:      GFR Calc  Starting 12/18/2018, serum creatinine based estimated GFR (eGFR) will be   calculated using the Chronic Kidney Disease Epidemiology Collaboration   (CKD-EPI) equation.     09/16/2019 >90 >60 mL/min/[1.73_m2] Final     Comment:      GFR Calc  Starting 12/18/2018, serum creatinine based estimated GFR (eGFR) will be   calculated using the Chronic Kidney Disease Epidemiology Collaboration   (CKD-EPI) equation.         Lab Results   Component Value Date    MICROL 14 11/11/2021    MICROL 11 08/13/2020     No results found for: MICROALBUMIN  No results found for: CPEPT, GADAB, ISCAB    Vitamin B12   Date Value Ref Range Status   11/11/2021 244 193 - 986 pg/mL Final   ]    Most recent eye exam date: : Not Found     She had eye appointment 11/11/21.     Assessment   Assessment/Plan:      1.  Type 1 DM-   Coral is doing very well.  Glucose average is down to 148 mg/dL and her variability is lower.  We have previously talked about dm technologies that might be helpful including In-pen and Omnipod 5 pump, but she feels comfortable with her current treatment strategy now.  Encouraged good behavior changes.  Discussed heart healthy eating ideas and  encouraged patient to increase consumption of fruits, vegetables and whole grains (high fiber diet).     2.  Risk factors- BP historically under good control.  Creatinine normal.  No MA.  Lipids ok on statin. Had recent eye exam. Will check a1c/tsh.      3.  F/U in 3 months with Dr. Ness.  Call sooner with concerns.      39 minutes spent on the date of the encounter doing chart review, review of test results, review of continuous glucose sensor, interpretation of glucose data, patient visit and documentation, counseling/coordination of care, and discussion of follow up plan for worsening hyper and hypoglycemia.  The patient understood and is satisfied with today's visit.       Melonie Coughlin PA-C, MPAS   [facility]  Department of Medicine  Division of Endocrinology and Diabetes

## 2022-08-25 ENCOUNTER — LAB (OUTPATIENT)
Dept: LAB | Facility: CLINIC | Age: 52
End: 2022-08-25
Payer: COMMERCIAL

## 2022-08-25 DIAGNOSIS — E10.65 TYPE 1 DIABETES MELLITUS WITH HYPERGLYCEMIA (H): ICD-10-CM

## 2022-08-25 DIAGNOSIS — E10.9 TYPE 1 DIABETES MELLITUS WITHOUT COMPLICATION (H): ICD-10-CM

## 2022-08-25 LAB
HBA1C MFR BLD: 6.8 % (ref 0–5.6)
TSH SERPL DL<=0.005 MIU/L-ACNC: 3.03 MU/L (ref 0.4–4)

## 2022-08-25 PROCEDURE — 84443 ASSAY THYROID STIM HORMONE: CPT

## 2022-08-25 PROCEDURE — 36415 COLL VENOUS BLD VENIPUNCTURE: CPT

## 2022-08-25 PROCEDURE — 83036 HEMOGLOBIN GLYCOSYLATED A1C: CPT

## 2022-08-29 DIAGNOSIS — E10.9 TYPE 1 DIABETES MELLITUS WITHOUT COMPLICATION (H): Primary | ICD-10-CM

## 2022-08-29 RX ORDER — INSULIN DEGLUDEC 100 U/ML
24 INJECTION, SOLUTION SUBCUTANEOUS DAILY
Qty: 9 ML | Refills: 3 | Status: SHIPPED | OUTPATIENT
Start: 2022-08-29 | End: 2022-08-30

## 2022-08-30 ENCOUNTER — TELEPHONE (OUTPATIENT)
Dept: ENDOCRINOLOGY | Facility: CLINIC | Age: 52
End: 2022-08-30

## 2022-08-30 DIAGNOSIS — E10.9 TYPE 1 DIABETES MELLITUS WITHOUT COMPLICATION (H): ICD-10-CM

## 2022-08-30 RX ORDER — INSULIN DEGLUDEC 100 U/ML
24 INJECTION, SOLUTION SUBCUTANEOUS DAILY
Qty: 15 ML | Refills: 3 | Status: SHIPPED | OUTPATIENT
Start: 2022-08-30 | End: 2023-02-20

## 2022-08-30 NOTE — TELEPHONE ENCOUNTER
Script sent to correct pharmacy which cancels te order at Jeny Devine RN on 8/30/2022 at 10:40 AM

## 2022-08-30 NOTE — TELEPHONE ENCOUNTER
[facility] Call Center    Phone Message    May a detailed message be left on voicemail: yes     Reason for Call: Medication Question or concern regarding medication   Prescription Clarification    Name of Medication:   * insulin degludec (TRESIBA FLEXTOUCH) 100 UNIT/ML pen    Prescribing Provider: Grover     [facility] [place] [facility] [id number] [facility]      What on the order needs clarification? Per Patient is wanting to get a call back. Patient states the prescription was sent to the wrong pharmacy and wanting to speak with the nurse to get it sent to the correct pharmacy which is listed above. Patient is wanting the prescription that was sent to the previous pharmacy to be cancelled. Please advise.       Action Taken: Message routed to:  [facility] Endo    Travel Screening: Not Applicable

## 2022-10-03 DIAGNOSIS — E10.9 WELL CONTROLLED TYPE 1 DIABETES MELLITUS (H): ICD-10-CM

## 2022-10-06 ENCOUNTER — VIRTUAL VISIT (OUTPATIENT)
Dept: FAMILY MEDICINE | Facility: CLINIC | Age: 52
End: 2022-10-06
Payer: COMMERCIAL

## 2022-10-06 DIAGNOSIS — U07.1 INFECTION DUE TO 2019 NOVEL CORONAVIRUS: Primary | ICD-10-CM

## 2022-10-06 DIAGNOSIS — E10.65 TYPE 1 DIABETES MELLITUS WITH HYPERGLYCEMIA (H): ICD-10-CM

## 2022-10-06 PROCEDURE — 99213 OFFICE O/P EST LOW 20 MIN: CPT | Mod: CS | Performed by: FAMILY MEDICINE

## 2022-10-06 NOTE — PROGRESS NOTES
Coral is a 52 year old who is being evaluated via a billable video visit.      How would you like to obtain your AVS? MyChart  If the video visit is dropped, the invitation should be resent by: Send to e-mail at: [email or web address]  Will anyone else be joining your video visit? No        Assessment & Plan     Infection due to 2019 novel coronavirus   daily feeling better.   After risks, benefits and alternatives discussed  Patient declined paxlovid and I feel this is wise given mild symptoms and daily improvement    Type 1 diabetes mellitus with hyperglycemia (H)   well controlled               BMI:   Estimated body mass index is 35.15 kg/m  as calculated from the following:    Height as of 5/13/22: 1.524 m (5').    Weight as of 5/13/22: 81.6 kg (180 lb).           No follow-ups on file.    Ines Hammond MD  [facility]   Coral is a 52 year old, presenting for the following health issues:  Video Visit (Positive Covid19)      HPI       COVID-19 Symptom Review  How many days ago did these symptoms start? Positive Covid19 test completed 10/3, sx began 10/2    Are any of the following symptoms significant for you?    New or worsening difficulty breathing? No    Worsening cough? Yes, it's a dry cough.     Fever or chills? No    Headache: No    Sore throat: No    Chest pain: No    Diarrhea: No    Body aches? No    What treatments has patient tried? Tylenol, cough drops  Does patient live in a nursing home, group home, or shelter? No  Does patient have a way to get food/medications during quarantined? Yes, I have a friend or family member who can help me.    Symptoms started Sunday/Monday - had a slight fever.  Monday was positive and she re-tested yesterday and was still positive    No fever since Monday night or Tuesday morning.    Symptoms now are mostly just nasal congestion.   No sore throat  No fever  Mild cough occasionally       patient has type I  diabetes.      Review of Systems   Constitutional, HEENT, cardiovascular, pulmonary, gi and gu systems are negative, except as otherwise noted.      Objective           Vitals:  No vitals were obtained today due to virtual visit.    Physical Exam   GENERAL: Healthy, alert and no distress  EYES: Eyes grossly normal to inspection.  No discharge or erythema, or obvious scleral/conjunctival abnormalities.  RESP: No audible wheeze, cough, or visible cyanosis.  No visible retractions or increased work of breathing.    SKIN: Visible skin clear. No significant rash, abnormal pigmentation or lesions.  NEURO: Cranial nerves grossly intact.  Mentation and speech appropriate for age.  PSYCH: Mentation appears normal, affect normal/bright, judgement and insight intact, normal speech and appearance well-groomed.                Video-Visit Details    Video Start Time: 305 pm    Type of service:  Video Visit    Video End Time:3:14 PM    Originating Location (pt. Location): Home    Distant Location (provider location):  [facility]     Platform used for Video Visit: Soniq[employer]

## 2022-10-07 ENCOUNTER — TELEPHONE (OUTPATIENT)
Dept: ENDOCRINOLOGY | Facility: CLINIC | Age: 52
End: 2022-10-07

## 2022-10-07 DIAGNOSIS — E10.9 WELL CONTROLLED TYPE 1 DIABETES MELLITUS (H): ICD-10-CM

## 2022-10-07 RX ORDER — PROCHLORPERAZINE 25 MG/1
SUPPOSITORY RECTAL
Qty: 1 EACH | Refills: 3 | Status: SHIPPED | OUTPATIENT
Start: 2022-10-07 | End: 2023-12-15

## 2022-10-07 NOTE — TELEPHONE ENCOUNTER
PA Initiation    Medication: Transmitter  Insurance Company: [employer] - Phone [phone] Fax [phone]  Pharmacy Filling the Rx:    Filling Pharmacy Phone:    Filling Pharmacy Fax:    Start Date: 10/7/2022

## 2022-10-07 NOTE — TELEPHONE ENCOUNTER
DEXCOM G6    MIS TRANSMIT  Last Written Prescription Date:   8/26/2021  Last Fill Quantity: 1,   # refills: 3  Last Office Visit :  8/1/2022  Future Office visit:   1/17/2023  1 Each, 3 refills sent to pharm       Teresa Abdul RN  Central Triage Red Flags/Med Refills

## 2022-10-12 RX ORDER — PROCHLORPERAZINE 25 MG/1
SUPPOSITORY RECTAL
Qty: 1 EACH | Refills: 3 | Status: CANCELLED | OUTPATIENT
Start: 2022-10-12

## 2022-10-13 NOTE — TELEPHONE ENCOUNTER
Transmitter PA needed and has been initiated and in pending status. Marsha Devine RN on 10/13/2022 at 12:48 PM

## 2022-10-30 DIAGNOSIS — E78.00 HIGH CHOLESTEROL: ICD-10-CM

## 2022-11-03 RX ORDER — SIMVASTATIN 40 MG
TABLET ORAL
Qty: 90 TABLET | Refills: 3 | Status: SHIPPED | OUTPATIENT
Start: 2022-11-03 | End: 2022-11-04

## 2022-11-04 DIAGNOSIS — E78.00 HIGH CHOLESTEROL: ICD-10-CM

## 2022-11-04 RX ORDER — SIMVASTATIN 40 MG
TABLET ORAL
Qty: 90 TABLET | Refills: 3 | Status: SHIPPED | OUTPATIENT
Start: 2022-11-04 | End: 2023-02-16

## 2022-11-09 ENCOUNTER — IMMUNIZATION (OUTPATIENT)
Dept: NURSING | Facility: CLINIC | Age: 52
End: 2022-11-09
Payer: COMMERCIAL

## 2022-11-09 PROCEDURE — 90682 RIV4 VACC RECOMBINANT DNA IM: CPT

## 2022-11-09 PROCEDURE — 91312 COVID-19,PF,PFIZER BOOSTER BIVALENT: CPT

## 2022-11-09 PROCEDURE — 90471 IMMUNIZATION ADMIN: CPT

## 2022-11-09 PROCEDURE — 0124A COVID-19,PF,PFIZER BOOSTER BIVALENT: CPT

## 2022-11-25 DIAGNOSIS — E10.9 WELL CONTROLLED TYPE 1 DIABETES MELLITUS (H): ICD-10-CM

## 2022-12-01 RX ORDER — PEN NEEDLE, DIABETIC 31 GX5/16"
NEEDLE, DISPOSABLE MISCELLANEOUS
Qty: 400 EACH | Refills: 0 | Status: SHIPPED | OUTPATIENT
Start: 2022-12-01 | End: 2023-09-14

## 2023-01-10 NOTE — PROGRESS NOTES
Coral Painting  is being evaluated via a billable video visit.      How would you like to obtain your AVS? B[employer]er [employer]  For the video visit, send the invitation by: Send to e-mail at: [email or web address]  Will anyone else be joining your video visit? No        Outcome for 01/10/23 3:40 PM: [employer] message sent  CAMILLA Alonso   Outcome for 01/13/23 1:06 PM: Left Voicemail   CAMILLA Alonso  Outcome for 01/16/23 10:39 AM: Dexcom emailed to provider  CAMILLA Alonso      This 52-year-old woman with longstanding type 1 diabetes was seen in follow-up.  She is comanaged with Melonie Coughlin who saw her a few months ago.  She currently is taking Tresiba 25 units at about 9:30 in the evening as well as NovoLog 3 units for each 15 g of carb.  She wears a Dexcom sensor and the data are below.  Overall she likes the Tresiba much better than the Lantus.  She feels it gives her much better overnight control and that her fasting sugar is improved.  She continues to work from home and is successful in getting her insulin before she eats.  During the holiday she did have some challenges when she went to parties that provided finger food to eat over hours.  Now that she is back to her usual schedule, her sugars are fine.  She recognizes her lows when the alarm goes off.  She usually does have some symptoms when it is less than 70.  She is not exercising very much.  She works from home.  She is due to see her eye doctor.  She has no paresthesias or foot ulcers.  She denies chest pain or shortness of breath.  She is concerned that her new insurance may not cover her Tresiba.                Current Outpatient Medications   Medication     B-D U/F 31G X 8 MM insulin pen needle     Continuous Blood Gluc  (DEXCOM G6 ) SUJEY     Continuous Blood Gluc Sensor (DEXCOM G6 SENSOR) MISC     Continuous Blood Gluc Transmit (DEXCOM G6 TRANSMITTER) MISC     insulin degludec (TRESIBA FLEXTOUCH) 100 UNIT/ML pen      NOVOLOG FLEXPEN 100 UNIT/ML soln     simvastatin (ZOCOR) 40 MG tablet     blood glucose (NO BRAND SPECIFIED) test strip     blood glucose (ONETOUCH ULTRA) test strip     blood glucose monitoring (NO BRAND SPECIFIED) meter device kit     blood glucose monitoring (SOFTCLIX) lancets     cyclobenzaprine (FLEXERIL) 10 MG tablet     HUMALOG KWIKPEN 100 UNIT/ML soln     insulin glargine (LANTUS SOLOSTAR) 100 UNIT/ML pen     insulin pen needle 31G X 8 MM     Lancets Misc. (ACCU-CHEK SOFTCLIX LANCET DEV) KIT     No current facility-administered medications for this visit.     Social history is significant for working now for [employer].  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       98.6  F (37  C)  as of 5/13/2022      Pulse: 86  as of 5/13/2022      BP: 137/84  as of 5/13/2022      Resp: 18  as of 5/13/2022      SpO2: 98%  as of 5/13/2022      Body Mass Index: None      Height: 1.524 m (5')  as of 5/13/2022      Weight: 81.6 kg (180 lb)  as of 5/13/2022          On exam she is in no acute distress.  Cranial nerves are grossly intact.  Mood is upbeat.  '  Recent Labs   Lab Test 08/25/22  0706 05/24/22  1454 02/21/22  0916 11/11/21  0913 03/22/21  1102 08/13/20  1645 08/13/20  1638 01/21/20  0000 09/16/19  0818 09/16/19  0000   A1C 6.8* 7.3*   < > 6.6* 8.1*  --  7.0*  --   --   --    HEMOGLOBINA1  --   --   --   --   --   --   --  6.3*  --  7.1*   TSH 3.03  --   --   --   --   --  2.05  --    < >  --    LDL  --   --   --  72  --   --  50  --    < >  --    HDL  --   --   --  50  --   --  72  --    < >  --    TRIG  --   --   --  74  --   --  61  --    < >  --    CR  --   --   --  0.67 0.72  --  0.78  --    < >  --    MICROL  --   --   --  14  --  11  --   --    < >  --     < > = values in this interval not displayed.     Assessment and plan:    1.  Diabetes control.  Overall she is doing very well.  I agree with her that the Tresiba provides her better overnight glucose control than it did  Moustapha.  I am hoping her insurance company will continue to cover this insulin for her.  I did encourage her to bolus for the food she eats in the evening.  She has been concerned that she will statin on top of her dinner dose.  I told her that as long as she gives no more insulin than is required for the carbs, she can go ahead and cover that.  That will help with some of the highs that we see between 9 PM and 2 AM.    2.  Diabetes complications.  I reminded her to see the eye doctor.  Her feet are fine.  I will order her renal function tests.    3.hyperlipidemia.  She is on a statin.  I will measure her fasting lipids.    4.CVD risk.  Her blood pressure was well controlled earlier this year.    Follow-up with Melonie Coughlin in 3 to 4 months and me in 6 to 8 months.    I spent 18 minutes with the patient on the video visit today (start time 3: 3 0 and end time 3: 4 8).  On the day of visit I spent an additional 12 minutes reviewing her chart, reviewing her CGM data, ordering labs, and doing documentation.    Henna Ness MD

## 2023-01-13 ENCOUNTER — TELEPHONE (OUTPATIENT)
Dept: ENDOCRINOLOGY | Facility: CLINIC | Age: 53
End: 2023-01-13

## 2023-01-13 NOTE — TELEPHONE ENCOUNTER
Called patient and left voicemail. Patient has an appointment on 1/17/23. Need patient to upload their Dexcom device to site for provider to review prior to their appointment.  Hyacinth Chávez on 1/13/2023 at 1:07 PM

## 2023-01-17 ENCOUNTER — VIRTUAL VISIT (OUTPATIENT)
Dept: ENDOCRINOLOGY | Facility: CLINIC | Age: 53
End: 2023-01-17
Payer: COMMERCIAL

## 2023-01-17 DIAGNOSIS — E10.9 TYPE 1 DIABETES MELLITUS WITHOUT COMPLICATION (H): Primary | ICD-10-CM

## 2023-01-17 PROCEDURE — 99214 OFFICE O/P EST MOD 30 MIN: CPT | Mod: GT | Performed by: INTERNAL MEDICINE

## 2023-01-17 PROCEDURE — 95251 CONT GLUC MNTR ANALYSIS I&R: CPT | Performed by: INTERNAL MEDICINE

## 2023-01-17 NOTE — NURSING NOTE
Patient is unsure of current pharmacy she will be using due to insurance change.    Dianelys Jolley LPN 01/17/23 3:27 PM

## 2023-01-17 NOTE — PATIENT INSTRUCTIONS
Have your lab tests done.  Please contact us to schedule at any of our [place] lab locations  Call [phone] ([phone]), select option [phone]     Schedule your appointment with the eye doctor    Cover for the carbs you eat as a snack in the evening.  You can expect that the food will sign your blood sugar up and if you give the right amount of insulin for the carbs in the food, it will not contribute to lows overnight

## 2023-01-17 NOTE — LETTER
1/17/2023       RE: Coral Painting  [address]  [facility] [address]     Dear Colleague,    Thank you for referring your patient, Coral Painting, to the [facility] END[facility] [place] [facility]. Please see a copy of my visit note below.    Coral Painting  is being evaluated via a billable video visit.      How would you like to obtain your AVS? [employer]et[employer]  For the video visit, send the invitation by: Send to e-mail at: [email or web address]  Will anyone else be joining your video visit? No        Outcome for 01/10/23 3:40 PM: [employer] message sent  CAMILLA Alonso   Outcome for 01/13/23 1:06 PM: Left Voicemail   CAMILLA Alonso  Outcome for 01/16/23 10:39 AM: Dexcom emailed to provider  CAMILLA Alonso      This 52-year-old woman with longstanding type 1 diabetes was seen in follow-up.  She is comanaged with Melonie Coughlin who saw her a few months ago.  She currently is taking Tresiba 25 units at about 9:30 in the evening as well as NovoLog 3 units for each 15 g of carb.  She wears a Dexcom sensor and the data are below.  Overall she likes the Tresiba much better than the Lantus.  She feels it gives her much better overnight control and that her fasting sugar is improved.  She continues to work from home and is successful in getting her insulin before she eats.  During the holiday she did have some challenges when she went to parties that provided finger food to eat over hours.  Now that she is back to her usual schedule, her sugars are fine.  She recognizes her lows when the alarm goes off.  She usually does have some symptoms when it is less than 70.  She is not exercising very much.  She works from home.  She is due to see her eye doctor.  She has no paresthesias or foot ulcers.  She denies chest pain or shortness of breath.  She is concerned that her new insurance may not cover her  Tresiba.                Current Outpatient Medications   Medication     B-D U/F 31G X 8 MM insulin pen needle     Continuous Blood Gluc  (DEXCOM G6 ) SUJEY     Continuous Blood Gluc Sensor (DEXCOM G6 SENSOR) MISC     Continuous Blood Gluc Transmit (DEXCOM G6 TRANSMITTER) MISC     insulin degludec (TRESIBA FLEXTOUCH) 100 UNIT/ML pen     NOVOLOG FLEXPEN 100 UNIT/ML soln     simvastatin (ZOCOR) 40 MG tablet     blood glucose (NO BRAND SPECIFIED) test strip     blood glucose (ONETOUCH ULTRA) test strip     blood glucose monitoring (NO BRAND SPECIFIED) meter device kit     blood glucose monitoring (SOFTCLIX) lancets     cyclobenzaprine (FLEXERIL) 10 MG tablet     HUMALOG KWIKPEN 100 UNIT/ML soln     insulin glargine (LANTUS SOLOSTAR) 100 UNIT/ML pen     insulin pen needle 31G X 8 MM     Lancets Misc. (ACCU-CHEK SOFTCLIX LANCET DEV) KIT     No current facility-administered medications for this visit.     Social history is significant for working now for [employer].  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       98.6  F (37  C)  as of 5/13/2022      Pulse: 86  as of 5/13/2022      BP: 137/84  as of 5/13/2022      Resp: 18  as of 5/13/2022      SpO2: 98%  as of 5/13/2022      Body Mass Index: None      Height: 1.524 m (5')  as of 5/13/2022      Weight: 81.6 kg (180 lb)  as of 5/13/2022          On exam she is in no acute distress.  Cranial nerves are grossly intact.  Mood is upbeat.  '  Recent Labs   Lab Test 08/25/22  0706 05/24/22  1454 02/21/22  0916 11/11/21  0913 03/22/21  1102 08/13/20  1645 08/13/20  1638 01/21/20  0000 09/16/19  0818 09/16/19  0000   A1C 6.8* 7.3*   < > 6.6* 8.1*  --  7.0*  --   --   --    HEMOGLOBINA1  --   --   --   --   --   --   --  6.3*  --  7.1*   TSH 3.03  --   --   --   --   --  2.05  --    < >  --    LDL  --   --   --  72  --   --  50  --    < >  --    HDL  --   --   --  50  --   --  72  --    < >  --    TRIG  --   --   --  74  --   --  61  --     < >  --    CR  --   --   --  0.67 0.72  --  0.78  --    < >  --    MICROL  --   --   --  14  --  11  --   --    < >  --     < > = values in this interval not displayed.     Assessment and plan:    1.  Diabetes control.  Overall she is doing very well.  I agree with her that the Tresiba provides her better overnight glucose control than it did Lantus.  I am hoping her insurance company will continue to cover this insulin for her.  I did encourage her to bolus for the food she eats in the evening.  She has been concerned that she will statin on top of her dinner dose.  I told her that as long as she gives no more insulin than is required for the carbs, she can go ahead and cover that.  That will help with some of the highs that we see between 9 PM and 2 AM.    2.  Diabetes complications.  I reminded her to see the eye doctor.  Her feet are fine.  I will order her renal function tests.    3.hyperlipidemia.  She is on a statin.  I will measure her fasting lipids.    4.CVD risk.  Her blood pressure was well controlled earlier this year.    Follow-up with Melonie Coughlin in 3 to 4 months and me in 6 to 8 months.    I spent 18 minutes with the patient on the video visit today (start time 3: 3 0 and end time 3: 4 8).  On the day of visit I spent an additional 12 minutes reviewing her chart, reviewing her CGM data, ordering labs, and doing documentation.    Henna Ness MD

## 2023-01-18 ENCOUNTER — TELEPHONE (OUTPATIENT)
Dept: ENDOCRINOLOGY | Facility: CLINIC | Age: 53
End: 2023-01-18
Payer: COMMERCIAL

## 2023-01-18 NOTE — TELEPHONE ENCOUNTER
Attempted to reach patient to schedule follow up in the Endocrinology Clinic. No answer, LM on VM to call office back.    Schedule with Henna Ness MD and EVELYN Colindres with directions below:    Follow-up with Melonie Coughlin in 3 to 4 months and with Henna Ness in 6-8 months.  Okay to put her on a CAMI slot.  1 visit should be virtual and the other should be in person her preference.     Dianelys Jolley, VF

## 2023-02-16 DIAGNOSIS — E10.9 WELL CONTROLLED TYPE 1 DIABETES MELLITUS (H): Primary | ICD-10-CM

## 2023-02-16 RX ORDER — ROSUVASTATIN CALCIUM 20 MG/1
20 TABLET, COATED ORAL DAILY
Qty: 90 TABLET | Refills: 3 | Status: SHIPPED | OUTPATIENT
Start: 2023-02-16 | End: 2023-12-12

## 2023-02-20 DIAGNOSIS — E10.9 TYPE 1 DIABETES MELLITUS WITHOUT COMPLICATION (H): ICD-10-CM

## 2023-02-20 RX ORDER — INSULIN ASPART 100 [IU]/ML
INJECTION, SOLUTION INTRAVENOUS; SUBCUTANEOUS
Qty: 75 ML | Refills: 3 | Status: SHIPPED | OUTPATIENT
Start: 2023-02-20 | End: 2023-05-08 | Stop reason: ALTCHOICE

## 2023-02-20 RX ORDER — INSULIN DEGLUDEC 100 U/ML
24 INJECTION, SOLUTION SUBCUTANEOUS DAILY
Qty: 30 ML | Refills: 3 | Status: SHIPPED | OUTPATIENT
Start: 2023-02-20 | End: 2023-05-08 | Stop reason: ALTCHOICE

## 2023-04-21 ENCOUNTER — LAB (OUTPATIENT)
Dept: LAB | Facility: CLINIC | Age: 53
End: 2023-04-21
Payer: COMMERCIAL

## 2023-04-21 DIAGNOSIS — E10.9 TYPE 1 DIABETES MELLITUS WITHOUT COMPLICATION (H): ICD-10-CM

## 2023-04-21 LAB
CHOLEST SERPL-MCNC: 128 MG/DL
CREAT SERPL-MCNC: 0.83 MG/DL (ref 0.51–0.95)
CREAT UR-MCNC: 189 MG/DL
GFR SERPL CREATININE-BSD FRML MDRD: 84 ML/MIN/1.73M2
HBA1C MFR BLD: 6.5 % (ref 0–5.6)
HDLC SERPL-MCNC: 58 MG/DL
LDLC SERPL CALC-MCNC: 48 MG/DL
MICROALBUMIN UR-MCNC: 33.7 MG/L
MICROALBUMIN/CREAT UR: 17.83 MG/G CR (ref 0–25)
NONHDLC SERPL-MCNC: 70 MG/DL
TRIGL SERPL-MCNC: 110 MG/DL

## 2023-04-21 PROCEDURE — 82565 ASSAY OF CREATININE: CPT

## 2023-04-21 PROCEDURE — 82043 UR ALBUMIN QUANTITATIVE: CPT

## 2023-04-21 PROCEDURE — 83036 HEMOGLOBIN GLYCOSYLATED A1C: CPT

## 2023-04-21 PROCEDURE — 82570 ASSAY OF URINE CREATININE: CPT

## 2023-04-21 PROCEDURE — 80061 LIPID PANEL: CPT

## 2023-04-21 PROCEDURE — 36415 COLL VENOUS BLD VENIPUNCTURE: CPT

## 2023-04-22 ENCOUNTER — HEALTH MAINTENANCE LETTER (OUTPATIENT)
Age: 53
End: 2023-04-22

## 2023-05-04 ASSESSMENT — ENCOUNTER SYMPTOMS
HOT FLASHES: 1
HOT FLASHES: 1

## 2023-05-05 ENCOUNTER — TELEPHONE (OUTPATIENT)
Dept: ENDOCRINOLOGY | Facility: CLINIC | Age: 53
End: 2023-05-05
Payer: COMMERCIAL

## 2023-05-05 NOTE — PROGRESS NOTES
Outcome for 05/05/23 11:48 AM :Left Voicemail for patient to call back asked pt to upload Dexcom.  Kelly Bjorn  Outcome for 05/05/23 11:49 AM :Sent patient [employer] message asking them to upload their BG data   Kelly Milan      HPI:  Ms. Painting is a 51 yo woman here for follow up of type 1 diabetes.  She also sees Dr. Ness. Her glucose has been under much better control lately.  She has been working on taking her boluses 15 minutes ahead of her meals more often.  She has been trying to increase her walking with a friend.  She sometimes drops low when she does this.  She has been frustrated with highs in the morning.  Glucose steadily climbs overnight even when she does not have a bedtime snack.      She is currently taking Tresiba 25 units daily.  She takes Novolog 3 units/15g CHO with meals plus correction of 1/25 over 175 mg/dL.  She sometimes backs off her her mealtime coverage a bit, but often forgets.   We previously talked about the In-pen, and pumps but she has privacy concerns and prefers not to have any additional things attached to her body.  She continues using the dexcom sensor.       Overall average glucose for the past 2 weeks is 170 mg/dL (CV 35%).                Typical diet:   Breakfast- cereal with banana (cheerios or grape nuts)  In the summer, she feels like she eats better.    Sometimes eats salads all week, more fruit in the summer.    Tuna sandwich, more pork lately. She doesn't like a lot of vegetables, mostly fruits.     She had a recent eye exam with no changes, per her report.  She switched from simvastatin to rosuvastatin. Cholesterol has improved.     She has no other concerns today.     RESULTS  Lab Results   Component Value Date    A1C 6.5 (H) 04/21/2023    A1C 6.8 (H) 08/25/2022    A1C 7.3 (H) 05/24/2022    A1C 7.2 (H) 02/21/2022    A1C 6.6 (H) 11/11/2021    A1C 8.1 (H) 03/22/2021    A1C 7.0 (H) 08/13/2020    A1C 7.7 (H) 11/06/2017    A1C 6.6 (A) 08/05/2013    A1C 7.5 (A)  02/04/2013    HEMOGLOBINA1 6.3 (A) 01/21/2020    HEMOGLOBINA1 7.1 (A) 09/16/2019    HEMOGLOBINA1 6.6 (A) 06/03/2019    HEMOGLOBINA1 7.2 (A) 08/20/2018    HEMOGLOBINA1 6.9 (A) 05/07/2018       TSH   Date Value Ref Range Status   08/25/2022 3.03 0.40 - 4.00 mU/L Final   08/13/2020 2.05 0.40 - 4.00 mU/L Final   09/16/2019 2.29 0.40 - 4.00 mU/L Final   08/14/2017 2.17 0.40 - 4.00 mU/L Final   10/24/2016 2.80 0.40 - 4.00 mU/L Final   08/13/2007 1.86 0.4 - 5.0 mU/L Final     T4 Free   Date Value Ref Range Status   08/13/2007 1.02 0.70 - 1.85 ng/dL Final       ALT   Date Value Ref Range Status   09/16/2019 15 0 - 50 U/L Final   06/04/2012 12 0 - 50 U/L Final   ]    Recent Labs   Lab Test 04/21/23  0717 11/11/21  0913   CHOL 128 137   HDL 58 50   LDL 48 72   TRIG 110 74       Lab Results   Component Value Date     11/11/2021     03/22/2021      Lab Results   Component Value Date    POTASSIUM 4.6 11/11/2021    POTASSIUM 4.1 03/22/2021     Lab Results   Component Value Date    CHLORIDE 106 11/11/2021    CHLORIDE 106 03/22/2021     Lab Results   Component Value Date    MICHELLE 8.5 11/11/2021    MICHELLE 8.7 03/22/2021     Lab Results   Component Value Date    CO2 22 11/11/2021    CO2 24 03/22/2021     Lab Results   Component Value Date    BUN 10 11/11/2021    BUN 12 03/22/2021     Lab Results   Component Value Date    CR 0.83 04/21/2023    CR 0.72 03/22/2021       GFR Estimate   Date Value Ref Range Status   04/21/2023 84 >60 mL/min/1.73m2 Final     Comment:     eGFR calculated using 2021 CKD-EPI equation.   11/11/2021 >90 >60 mL/min/1.73m2 Final     Comment:     As of July 11, 2021, eGFR is calculated by the CKD-EPI creatinine equation, without race adjustment. eGFR can be influenced by muscle mass, exercise, and diet. The reported eGFR is an estimation only and is only applicable if the renal function is stable.   03/22/2021 >90 >60 mL/min/[1.73_m2] Final     Comment:     Non  GFR Calc  Starting  12/18/2018, serum creatinine based estimated GFR (eGFR) will be   calculated using the Chronic Kidney Disease Epidemiology Collaboration   (CKD-EPI) equation.     08/13/2020 88 >60 mL/min/[1.73_m2] Final     Comment:     Non  GFR Calc  Starting 12/18/2018, serum creatinine based estimated GFR (eGFR) will be   calculated using the Chronic Kidney Disease Epidemiology Collaboration   (CKD-EPI) equation.     09/16/2019 >90 >60 mL/min/[1.73_m2] Final     Comment:     Non  GFR Calc  Starting 12/18/2018, serum creatinine based estimated GFR (eGFR) will be   calculated using the Chronic Kidney Disease Epidemiology Collaboration   (CKD-EPI) equation.       GFR Estimate If Black   Date Value Ref Range Status   03/22/2021 >90 >60 mL/min/[1.73_m2] Final     Comment:      GFR Calc  Starting 12/18/2018, serum creatinine based estimated GFR (eGFR) will be   calculated using the Chronic Kidney Disease Epidemiology Collaboration   (CKD-EPI) equation.     08/13/2020 >90 >60 mL/min/[1.73_m2] Final     Comment:      GFR Calc  Starting 12/18/2018, serum creatinine based estimated GFR (eGFR) will be   calculated using the Chronic Kidney Disease Epidemiology Collaboration   (CKD-EPI) equation.     09/16/2019 >90 >60 mL/min/[1.73_m2] Final     Comment:      GFR Calc  Starting 12/18/2018, serum creatinine based estimated GFR (eGFR) will be   calculated using the Chronic Kidney Disease Epidemiology Collaboration   (CKD-EPI) equation.         Lab Results   Component Value Date    MICROL 33.7 04/21/2023    MICROL 14 11/11/2021    MICROL 11 08/13/2020     No results found for: MICROALBUMIN  No results found for: CPEPT, GADAB, ISCAB    Vitamin B12   Date Value Ref Range Status   11/11/2021 244 193 - 986 pg/mL Final   ]    Most recent eye exam date: : Not Found     Assessment   Assessment/Plan:      1.  Type 1 DM-   Coral is doing very well. A1c is 6.4%. Glucose is  climbing overnight and is a bit more variable.   Lows seem to be related to too much mealtime coverage.  Will back off to 2.5 units/carb. We have previously talked about dm technologies that might be helpful including In-pen and Omnipod 5 pump, but she is concerned about privacy and also does not like to be connected to anything more than the dexcom.  Will switch from Tresiba to Toujeo and increase dose to 26 units (insurance change required).  We also discussed switching to Lyumjev, as this is quicker acting.  Briefly discussed option of using wegovy to aid in weight loss.  She will think about it.  We made the following plan today (instructions given to patient):      Switch to Lyumjev from Novolog.      Take this 0-5 minutes before eating.  It has a faster onset of action.      Switch from Tresiba to Toujeo- same dose 26 units (up from 25).     Try to do the walks before meals, rather than after.    Schedule eye exam.     Consider using Wegovy (or ozempic) for weight loss.  Wegovy is more likely to be covered in someone with type 1 dm.     Emergency issues: Here are some concerns you should contact us about.  -Vomiting: more than twice.  Please check ketones.  If positive, go to ER. Monitor glucose hourly.   -High glucose (over 300 mg/dL twice in a row): Please check ketones.  If ketones are negative, take an insulin correction and recheck glucose in 1 hour.  If glucose is not coming down, please call the clinic. If ketones are moderate or large, drink lots of water, take an insulin correction 1.5 times your usual correction, and recheck ketones in 1 hour.  If ketones are still present (or you are vomiting), go to the ER.  -Hypoglycemia (low glucose):   If glucose is less than 70 mg/dL, treat with 15g carb (4 glucose tablets), recheck glucose in 15 minutes.  If low again, repeat.   If glucose is less than 54 mg/dL, treat with 30g carb, recheck glucose in 15 minutes.  If low again, repeat.  Keep glucagon in your  home in case of severe hypoglycemia and train someone how to use this.    Emergency kit (please ensure you always have these with you):   Glucose tablets  Glucagon  Insulin  Syringes/needles   Extra infusion set (if on a pump)  Ketone strips    Contact information:   If you have concerns, please send me a [employer] message or call the clinic at [phone].  For more urgent concerns, please call [phone] after hours/weekends and ask to speak with the endocrinologist on call.      Please let me know if you are having low blood sugars less than 70 or over 350 mg/dL.  Do not wait until your next appointment if this is happening.      2.  Risk factors- BP under good control.  Creatinine normal.  No MA.  Lipids well controlled on statin. Had recent eye exam.      3.  F/U in 3 months with Dr. Ness.  Call sooner with concerns.      39 minutes spent on the date of the encounter doing chart review, review of test results, review of continuous glucose sensor, interpretation of glucose data, patient visit and documentation, counseling/coordination of care, and discussion of follow up plan for worsening hyper and hypoglycemia.  The patient understood and is satisfied with today's visit.       Melonie Coughlin PA-C, MPAS   [facility]  Department of Medicine  Division of Endocrinology and Diabetes

## 2023-05-08 ENCOUNTER — OFFICE VISIT (OUTPATIENT)
Dept: ENDOCRINOLOGY | Facility: CLINIC | Age: 53
End: 2023-05-08
Payer: COMMERCIAL

## 2023-05-08 ENCOUNTER — OFFICE VISIT (OUTPATIENT)
Dept: INTERNAL MEDICINE | Facility: CLINIC | Age: 53
End: 2023-05-08
Payer: COMMERCIAL

## 2023-05-08 VITALS
DIASTOLIC BLOOD PRESSURE: 76 MMHG | HEART RATE: 84 BPM | OXYGEN SATURATION: 97 % | WEIGHT: 201.8 LBS | SYSTOLIC BLOOD PRESSURE: 142 MMHG | BODY MASS INDEX: 38.1 KG/M2 | HEIGHT: 61 IN

## 2023-05-08 VITALS
SYSTOLIC BLOOD PRESSURE: 124 MMHG | WEIGHT: 201.8 LBS | BODY MASS INDEX: 39.41 KG/M2 | OXYGEN SATURATION: 95 % | DIASTOLIC BLOOD PRESSURE: 79 MMHG | HEART RATE: 108 BPM

## 2023-05-08 DIAGNOSIS — E10.9 WELL CONTROLLED TYPE 1 DIABETES MELLITUS (H): Primary | ICD-10-CM

## 2023-05-08 DIAGNOSIS — Z12.11 SPECIAL SCREENING FOR MALIGNANT NEOPLASMS, COLON: ICD-10-CM

## 2023-05-08 DIAGNOSIS — R07.9 CHEST PAIN, UNSPECIFIED TYPE: Primary | ICD-10-CM

## 2023-05-08 DIAGNOSIS — Z12.31 VISIT FOR SCREENING MAMMOGRAM: ICD-10-CM

## 2023-05-08 DIAGNOSIS — Z23 NEED FOR VACCINATION: ICD-10-CM

## 2023-05-08 DIAGNOSIS — E66.01 MORBID OBESITY (H): ICD-10-CM

## 2023-05-08 LAB
ATRIAL RATE - MUSE: 85 BPM
DIASTOLIC BLOOD PRESSURE - MUSE: NORMAL MMHG
INTERPRETATION ECG - MUSE: NORMAL
P AXIS - MUSE: 65 DEGREES
PR INTERVAL - MUSE: 142 MS
QRS DURATION - MUSE: 68 MS
QT - MUSE: 358 MS
QTC - MUSE: 426 MS
R AXIS - MUSE: 22 DEGREES
SYSTOLIC BLOOD PRESSURE - MUSE: NORMAL MMHG
T AXIS - MUSE: 41 DEGREES
VENTRICULAR RATE- MUSE: 85 BPM

## 2023-05-08 PROCEDURE — 90677 PCV20 VACCINE IM: CPT | Performed by: INTERNAL MEDICINE

## 2023-05-08 PROCEDURE — 99203 OFFICE O/P NEW LOW 30 MIN: CPT | Mod: 25 | Performed by: INTERNAL MEDICINE

## 2023-05-08 PROCEDURE — 99214 OFFICE O/P EST MOD 30 MIN: CPT | Performed by: PHYSICIAN ASSISTANT

## 2023-05-08 PROCEDURE — 90471 IMMUNIZATION ADMIN: CPT | Performed by: INTERNAL MEDICINE

## 2023-05-08 PROCEDURE — 93000 ELECTROCARDIOGRAM COMPLETE: CPT | Performed by: INTERNAL MEDICINE

## 2023-05-08 RX ORDER — INSULIN GLARGINE 300 U/ML
INJECTION, SOLUTION SUBCUTANEOUS
Qty: 9 ML | Refills: 3 | Status: SHIPPED | OUTPATIENT
Start: 2023-05-08 | End: 2023-12-12

## 2023-05-08 RX ORDER — URINE ACETONE TEST STRIPS
STRIP MISCELLANEOUS
Qty: 50 STRIP | Refills: 3 | Status: SHIPPED | OUTPATIENT
Start: 2023-05-08

## 2023-05-08 RX ORDER — INSULIN LISPRO-AABC 100 [IU]/ML
60 INJECTION, SOLUTION SUBCUTANEOUS DAILY
Qty: 60 ML | Refills: 3 | Status: SHIPPED | OUTPATIENT
Start: 2023-05-08 | End: 2024-07-10

## 2023-05-08 ASSESSMENT — PAIN SCALES - GENERAL: PAINLEVEL: NO PAIN (0)

## 2023-05-08 NOTE — PROGRESS NOTES
Assessment & Plan     Chest pain, unspecified type    Coral presents with 2 months of brief intermittent left chest pain that seems to be associated with arm movement and is therefore likely MSK in etiology, though not reproducible on exam today.  However, given her history of DM1 and FHx of CAD, did recommend further evaluation.  EKG in clinic today showed non specific T wave abnormality.  Stress test ordered for further evaluation.     - EKG 12-lead complete w/read - Clinics  - NM Lexiscan stress test; Future    Visit for screening mammogram    - MA Screen Bilateral w/Rad; Future    Special screening for malignant neoplasms, colon    She did a FIT test a couple of years ago for work-up of anemia.  Discussed that this could be done annually for colon cancer screening vs Cologuard or colonoscopy.  She wants to consider the options further; can call back for order when decided.      Need for vaccination    Prevnar 20 given today.  Discussed checking pharmacy for tetanus and shingles vaccines.       Lakhwinder Wallace MD  [facility] INTERNAL MEDICINE [place]    Henrietta Moncada is a 52 year old, presenting for the following health issues:  Establish Care (Chest pain )    HPI     Coral has a history of DM1 and presents with 2 month of left chest pain.  Pulling type pain with movement of the left arm that lasts just a few seconds.  No radiation.  No shortness of breath with the pain but dose have some MCGOVERN going up stairs.  No nausea or sweats.  She has some left hand tingling at night.  Gets some periodic foot swelling for years (not overall worse), worse in warmer weather.  No chest pressure.  Hasn't noticed it while walking.  No injury or new activities.  She hasn't taken anything for pain.  Has a family history of heart issues on her dad's side in both grandparents but not her father.  She notes weight gain over the pandemic.      BP is somewhat high at 142/76 today but BP was 124/79 at endocrine  "appointment this morning.         Review of Systems   Constitutional, cardiovascular, pulmonary systems are negative, except as otherwise noted.        Objective    BP (!) 142/76 (BP Location: Right arm, Patient Position: Sitting, Cuff Size: Adult Regular)   Pulse 84   Ht 1.547 m (5' 0.91\")   Wt 91.5 kg (201 lb 12.8 oz)   SpO2 97%   BMI 38.25 kg/m    Body mass index is 38.25 kg/m .  Physical Exam   GENERAL: healthy, alert and no distress  NECK: no adenopathy, no asymmetry, masses, or scars and thyroid normal to palpation  RESP: lungs clear to auscultation - no rales, rhonchi or wheezes  CV: regular rate and rhythm, normal S1 S2, no S3 or S4, no murmur, click or rub, no peripheral edema  ABDOMEN: soft, nontender  MS: no pain to palpation of upper left chest wall or around sternum  PSYCH: mentation appears normal, affect normal/bright                   "

## 2023-05-08 NOTE — PATIENT INSTRUCTIONS
Stress test and mammogram were ordered.  Please call [phone] to schedule.     *    We have a few options for colon cancer screening:    *Colonoscopy is still considered to be the best option for screening.  It has the advantage that they can remove polyps if they find them, but the disadvantage is that it is an invasive procedure and you have to do the colon cleansing prep beforehand.  If colonoscopy is normal, it is an every 10 year test.    *The FIT and Cologuard tests are both stool based tests where you collect a stool sample at home and send it in for testing.  The FIT test checks for blood and is every 1 year if normal, and the Cologuard checks for DNA changes and is every 3 years.  They are much easier to do, non-invasive, and don't require any colon prep.  Obviously, polyps can't be removed with these stool based tests, so that is a disadvantage.  These tests are reasonable alternatives to colonoscopy as long as you keep up with doing them regularly.  If the test is positive, then a colonoscopy is recommended for further evaluation.     *    Check the pharmacy for the tetanus and shingles vaccines.

## 2023-05-08 NOTE — LETTER
5/8/2023       RE: Coral Painting  [address]  [facility] [id number]     Dear Colleague,    Thank you for referring your patient, Coral Painting, to the [facility] [place] at [facility]. Please see a copy of my visit note below.    Outcome for 05/05/23 11:48 AM :Left Voicemail for patient to call back asked pt to upload Dexcom.  Kelly Milan  Outcome for 05/05/23 11:49 AM :Sent patient [employer] message asking them to upload their BG data   Kelly Milan      HPI:  Ms. Painting is a 53 yo woman here for follow up of type 1 diabetes.  She also sees Dr. Ness. Her glucose has been under much better control lately.  She has been working on taking her boluses 15 minutes ahead of her meals more often.  She has been trying to increase her walking with a friend.  She sometimes drops low when she does this.  She has been frustrated with highs in the morning.  Glucose steadily climbs overnight even when she does not have a bedtime snack.      She is currently taking Tresiba 25 units daily.  She takes Novolog 3 units/15g CHO with meals plus correction of 1/25 over 175 mg/dL.  She sometimes backs off her her mealtime coverage a bit, but often forgets.   We previously talked about the In-pen, and pumps but she has privacy concerns and prefers not to have any additional things attached to her body.  She continues using the dexcom sensor.       Overall average glucose for the past 2 weeks is 170 mg/dL (CV 35%).                Typical diet:   Breakfast- cereal with banana (cheerios or grape nuts)  In the summer, she feels like she eats better.    Sometimes eats salads all week, more fruit in the summer.    Tuna sandwich, more pork lately. She doesn't like a lot of vegetables, mostly fruits.     She had a recent eye exam with no changes, per her report.  She switched from simvastatin to rosuvastatin. Cholesterol has improved.      She has no other concerns today.     RESULTS  Lab Results   Component Value Date    A1C 6.5 (H) 04/21/2023    A1C 6.8 (H) 08/25/2022    A1C 7.3 (H) 05/24/2022    A1C 7.2 (H) 02/21/2022    A1C 6.6 (H) 11/11/2021    A1C 8.1 (H) 03/22/2021    A1C 7.0 (H) 08/13/2020    A1C 7.7 (H) 11/06/2017    A1C 6.6 (A) 08/05/2013    A1C 7.5 (A) 02/04/2013    HEMOGLOBINA1 6.3 (A) 01/21/2020    HEMOGLOBINA1 7.1 (A) 09/16/2019    HEMOGLOBINA1 6.6 (A) 06/03/2019    HEMOGLOBINA1 7.2 (A) 08/20/2018    HEMOGLOBINA1 6.9 (A) 05/07/2018       TSH   Date Value Ref Range Status   08/25/2022 3.03 0.40 - 4.00 mU/L Final   08/13/2020 2.05 0.40 - 4.00 mU/L Final   09/16/2019 2.29 0.40 - 4.00 mU/L Final   08/14/2017 2.17 0.40 - 4.00 mU/L Final   10/24/2016 2.80 0.40 - 4.00 mU/L Final   08/13/2007 1.86 0.4 - 5.0 mU/L Final     T4 Free   Date Value Ref Range Status   08/13/2007 1.02 0.70 - 1.85 ng/dL Final       ALT   Date Value Ref Range Status   09/16/2019 15 0 - 50 U/L Final   06/04/2012 12 0 - 50 U/L Final   ]    Recent Labs   Lab Test 04/21/23  0717 11/11/21  0913   CHOL 128 137   HDL 58 50   LDL 48 72   TRIG 110 74       Lab Results   Component Value Date     11/11/2021     03/22/2021      Lab Results   Component Value Date    POTASSIUM 4.6 11/11/2021    POTASSIUM 4.1 03/22/2021     Lab Results   Component Value Date    CHLORIDE 106 11/11/2021    CHLORIDE 106 03/22/2021     Lab Results   Component Value Date    MICHELLE 8.5 11/11/2021    MICHELLE 8.7 03/22/2021     Lab Results   Component Value Date    CO2 22 11/11/2021    CO2 24 03/22/2021     Lab Results   Component Value Date    BUN 10 11/11/2021    BUN 12 03/22/2021     Lab Results   Component Value Date    CR 0.83 04/21/2023    CR 0.72 03/22/2021       GFR Estimate   Date Value Ref Range Status   04/21/2023 84 >60 mL/min/1.73m2 Final     Comment:     eGFR calculated using 2021 CKD-EPI equation.   11/11/2021 >90 >60 mL/min/1.73m2 Final     Comment:     As of July 11, 2021, eGFR is  calculated by the CKD-EPI creatinine equation, without race adjustment. eGFR can be influenced by muscle mass, exercise, and diet. The reported eGFR is an estimation only and is only applicable if the renal function is stable.   03/22/2021 >90 >60 mL/min/[1.73_m2] Final     Comment:     Non  GFR Calc  Starting 12/18/2018, serum creatinine based estimated GFR (eGFR) will be   calculated using the Chronic Kidney Disease Epidemiology Collaboration   (CKD-EPI) equation.     08/13/2020 88 >60 mL/min/[1.73_m2] Final     Comment:     Non  GFR Calc  Starting 12/18/2018, serum creatinine based estimated GFR (eGFR) will be   calculated using the Chronic Kidney Disease Epidemiology Collaboration   (CKD-EPI) equation.     09/16/2019 >90 >60 mL/min/[1.73_m2] Final     Comment:     Non  GFR Calc  Starting 12/18/2018, serum creatinine based estimated GFR (eGFR) will be   calculated using the Chronic Kidney Disease Epidemiology Collaboration   (CKD-EPI) equation.       GFR Estimate If Black   Date Value Ref Range Status   03/22/2021 >90 >60 mL/min/[1.73_m2] Final     Comment:      GFR Calc  Starting 12/18/2018, serum creatinine based estimated GFR (eGFR) will be   calculated using the Chronic Kidney Disease Epidemiology Collaboration   (CKD-EPI) equation.     08/13/2020 >90 >60 mL/min/[1.73_m2] Final     Comment:      GFR Calc  Starting 12/18/2018, serum creatinine based estimated GFR (eGFR) will be   calculated using the Chronic Kidney Disease Epidemiology Collaboration   (CKD-EPI) equation.     09/16/2019 >90 >60 mL/min/[1.73_m2] Final     Comment:      GFR Calc  Starting 12/18/2018, serum creatinine based estimated GFR (eGFR) will be   calculated using the Chronic Kidney Disease Epidemiology Collaboration   (CKD-EPI) equation.         Lab Results   Component Value Date    MICROL 33.7 04/21/2023    MICROL 14 11/11/2021    MICROL 11  08/13/2020     No results found for: MICROALBUMIN  No results found for: CPEPT, GADAB, ISCAB    Vitamin B12   Date Value Ref Range Status   11/11/2021 244 193 - 986 pg/mL Final   ]    Most recent eye exam date: : Not Found     Assessment   Assessment/Plan:      1.  Type 1 DM-   Coral is doing very well. A1c is 6.4%. Glucose is climbing overnight and is a bit more variable.   Lows seem to be related to too much mealtime coverage.  Will back off to 2.5 units/carb. We have previously talked about dm technologies that might be helpful including In-pen and Omnipod 5 pump, but she is concerned about privacy and also does not like to be connected to anything more than the dexcom.  Will switch from Tresiba to Toujeo and increase dose to 26 units (insurance change required).  We also discussed switching to Lyumjev, as this is quicker acting.  Briefly discussed option of using wegovy to aid in weight loss.  She will think about it.  We made the following plan today (instructions given to patient):      Switch to Lyumjev from Novolog.      Take this 0-5 minutes before eating.  It has a faster onset of action.      Switch from Tresiba to Toujeo- same dose 26 units (up from 25).     Try to do the walks before meals, rather than after.    Schedule eye exam.     Consider using Wegovy (or ozempic) for weight loss.  Wegovy is more likely to be covered in someone with type 1 dm.     Emergency issues: Here are some concerns you should contact us about.  -Vomiting: more than twice.  Please check ketones.  If positive, go to ER. Monitor glucose hourly.   -High glucose (over 300 mg/dL twice in a row): Please check ketones.  If ketones are negative, take an insulin correction and recheck glucose in 1 hour.  If glucose is not coming down, please call the clinic. If ketones are moderate or large, drink lots of water, take an insulin correction 1.5 times your usual correction, and recheck ketones in 1 hour.  If ketones are still present (or  you are vomiting), go to the ER.  -Hypoglycemia (low glucose):   If glucose is less than 70 mg/dL, treat with 15g carb (4 glucose tablets), recheck glucose in 15 minutes.  If low again, repeat.   If glucose is less than 54 mg/dL, treat with 30g carb, recheck glucose in 15 minutes.  If low again, repeat.  Keep glucagon in your home in case of severe hypoglycemia and train someone how to use this.    Emergency kit (please ensure you always have these with you):   Glucose tablets  Glucagon  Insulin  Syringes/needles   Extra infusion set (if on a pump)  Ketone strips    Contact information:   If you have concerns, please send me a [employer] message or call the clinic at [phone].  For more urgent concerns, please call [phone] after hours/weekends and ask to speak with the endocrinologist on call.      Please let me know if you are having low blood sugars less than 70 or over 350 mg/dL.  Do not wait until your next appointment if this is happening.      2.  Risk factors- BP under good control.  Creatinine normal.  No MA.  Lipids well controlled on statin. Had recent eye exam.      3.  F/U in 3 months with Dr. Ness.  Call sooner with concerns.      39 minutes spent on the date of the encounter doing chart review, review of test results, review of continuous glucose sensor, interpretation of glucose data, patient visit and documentation, counseling/coordination of care, and discussion of follow up plan for worsening hyper and hypoglycemia.  The patient understood and is satisfied with today's visit.       Melonie Coughlin PA-C, MPAS   [facility]  Department of Medicine  Division of Endocrinology and Diabetes

## 2023-05-08 NOTE — PATIENT INSTRUCTIONS
Switch to Lyumjev from Novolog.      Take this 0-5 minutes before eating.  It has a faster onset of action.      Switch from Tresiba to Toujeo- same dose 26 units (up from 25).     Try to do the walks before meals, rather than after.    Schedule eye exam.     Consider using Wegovy (or ozempic) for weight loss.  Wegovy is more likely to be covered in someone with type 1 dm.     Emergency issues: Here are some concerns you should contact us about.  -Vomiting: more than twice.  Please check ketones.  If positive, go to ER. Monitor glucose hourly.   -High glucose (over 300 mg/dL twice in a row): Please check ketones.  If ketones are negative, take an insulin correction and recheck glucose in 1 hour.  If glucose is not coming down, please call the clinic. If ketones are moderate or large, drink lots of water, take an insulin correction 1.5 times your usual correction, and recheck ketones in 1 hour.  If ketones are still present (or you are vomiting), go to the ER.  -Hypoglycemia (low glucose):   If glucose is less than 70 mg/dL, treat with 15g carb (4 glucose tablets), recheck glucose in 15 minutes.  If low again, repeat.   If glucose is less than 54 mg/dL, treat with 30g carb, recheck glucose in 15 minutes.  If low again, repeat.  Keep glucagon in your home in case of severe hypoglycemia and train someone how to use this.    Emergency kit (please ensure you always have these with you):   Glucose tablets  Glucagon  Insulin  Syringes/needles   Extra infusion set (if on a pump)  Ketone strips    Contact information:   If you have concerns, please send me a [employer] message or call the clinic at [phone].  For more urgent concerns, please call [phone] after hours/weekends and ask to speak with the endocrinologist on call.      Please let me know if you are having low blood sugars less than 70 or over 350 mg/dL.  Do not wait until your next appointment if this is happening.

## 2023-05-08 NOTE — NURSING NOTE
Coral Painting is a 52 year old female patient that presents today in clinic for the following:    Chief Complaint   Patient presents with     Establish Care     Chest pain      The patient's allergies and medications were reviewed as noted. A set of vitals were recorded as noted without incident. The patient does not have any other questions for the provider.    Amadeo Payan, EMT at 1:21 PM on 5/8/2023

## 2023-05-15 ENCOUNTER — HOSPITAL ENCOUNTER (OUTPATIENT)
Dept: NUCLEAR MEDICINE | Facility: CLINIC | Age: 53
Setting detail: NUCLEAR MEDICINE
Discharge: HOME | End: 2023-05-15
Attending: INTERNAL MEDICINE
Payer: COMMERCIAL

## 2023-05-15 ENCOUNTER — ANCILLARY ORDERS (OUTPATIENT)
Dept: INTERNAL MEDICINE | Facility: CLINIC | Age: 53
End: 2023-05-15

## 2023-05-15 ENCOUNTER — HOSPITAL ENCOUNTER (OUTPATIENT)
Dept: CARDIOLOGY | Facility: CLINIC | Age: 53
Discharge: HOME | End: 2023-05-15
Attending: INTERNAL MEDICINE
Payer: COMMERCIAL

## 2023-05-15 VITALS — DIASTOLIC BLOOD PRESSURE: 72 MMHG | SYSTOLIC BLOOD PRESSURE: 138 MMHG | HEART RATE: 115 BPM

## 2023-05-15 DIAGNOSIS — R07.9 CHEST PAIN, UNSPECIFIED TYPE: ICD-10-CM

## 2023-05-15 LAB
CV STRESS MAX HR HE: 113
RATE PRESSURE PRODUCT: NORMAL
STRESS ECHO BASELINE DIASTOLIC HE: 72
STRESS ECHO BASELINE HR: 84 BPM
STRESS ECHO BASELINE SYSTOLIC BP: 138
STRESS ECHO CALCULATED PERCENT HR: 68 %
STRESS ECHO LAST STRESS DIASTOLIC BP: 70
STRESS ECHO LAST STRESS SYSTOLIC BP: 158
STRESS ECHO TARGET HR: 167

## 2023-05-15 PROCEDURE — A9502 TC99M TETROFOSMIN: HCPCS | Performed by: INTERNAL MEDICINE

## 2023-05-15 PROCEDURE — 93016 CV STRESS TEST SUPVJ ONLY: CPT | Performed by: INTERNAL MEDICINE

## 2023-05-15 PROCEDURE — 93018 CV STRESS TEST I&R ONLY: CPT | Performed by: INTERNAL MEDICINE

## 2023-05-15 PROCEDURE — 93017 CV STRESS TEST TRACING ONLY: CPT

## 2023-05-15 PROCEDURE — 78452 HT MUSCLE IMAGE SPECT MULT: CPT | Mod: 26 | Performed by: RADIOLOGY

## 2023-05-15 PROCEDURE — 78452 HT MUSCLE IMAGE SPECT MULT: CPT

## 2023-05-15 PROCEDURE — 250N000011 HC RX IP 250 OP 636: Performed by: INTERNAL MEDICINE

## 2023-05-15 PROCEDURE — 343N000001 HC RX 343: Performed by: INTERNAL MEDICINE

## 2023-05-15 RX ORDER — CAFFEINE CITRATE 20 MG/ML
60 SOLUTION INTRAVENOUS
Status: DISCONTINUED | OUTPATIENT
Start: 2023-05-15 | End: 2023-05-16 | Stop reason: HOSPADM

## 2023-05-15 RX ORDER — ALBUTEROL SULFATE 90 UG/1
2 AEROSOL, METERED RESPIRATORY (INHALATION) EVERY 5 MIN PRN
Status: DISCONTINUED | OUTPATIENT
Start: 2023-05-15 | End: 2023-05-16 | Stop reason: HOSPADM

## 2023-05-15 RX ORDER — ACYCLOVIR 200 MG/1
0-1 CAPSULE ORAL
Status: DISCONTINUED | OUTPATIENT
Start: 2023-05-15 | End: 2023-05-16 | Stop reason: HOSPADM

## 2023-05-15 RX ORDER — AMINOPHYLLINE 25 MG/ML
50-100 INJECTION, SOLUTION INTRAVENOUS
Status: DISCONTINUED | OUTPATIENT
Start: 2023-05-15 | End: 2023-05-16 | Stop reason: HOSPADM

## 2023-05-15 RX ORDER — REGADENOSON 0.08 MG/ML
0.4 INJECTION, SOLUTION INTRAVENOUS ONCE
Status: COMPLETED | OUTPATIENT
Start: 2023-05-15 | End: 2023-05-15

## 2023-05-15 RX ADMIN — TETROFOSMIN 40.8 MILLICURIE: 1.38 INJECTION, POWDER, LYOPHILIZED, FOR SOLUTION INTRAVENOUS at 10:31

## 2023-05-15 RX ADMIN — TETROFOSMIN 10.7 MILLICURIE: 1.38 INJECTION, POWDER, LYOPHILIZED, FOR SOLUTION INTRAVENOUS at 09:22

## 2023-05-15 RX ADMIN — REGADENOSON 0.4 MG: 0.08 INJECTION, SOLUTION INTRAVENOUS at 10:29

## 2023-05-15 NOTE — PROGRESS NOTES
Pt here for Lexiscan nuclear stress test. Medication and side effects reviewed with patient. Lung sounds clear to auscultation bilaterally. Denied caffeine use. Patient tolerated Lexiscan dose without any adverse reactions. VSS. Monitored post injection and then taken to nuclear medicine for follow up imaging.

## 2023-07-05 ENCOUNTER — OFFICE VISIT (OUTPATIENT)
Dept: URGENT CARE | Facility: URGENT CARE | Age: 53
End: 2023-07-05
Payer: COMMERCIAL

## 2023-07-05 VITALS
SYSTOLIC BLOOD PRESSURE: 125 MMHG | HEART RATE: 92 BPM | RESPIRATION RATE: 12 BRPM | TEMPERATURE: 98.2 F | DIASTOLIC BLOOD PRESSURE: 84 MMHG | OXYGEN SATURATION: 96 %
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
## 2023-07-05 DIAGNOSIS — E10.628 TYPE 1 DIABETES MELLITUS WITH OTHER SKIN COMPLICATION (H): ICD-10-CM

## 2023-07-05 DIAGNOSIS — S20.462A INFECTED INSECT BITE OF BACK, LEFT, INITIAL ENCOUNTER: Primary | ICD-10-CM

## 2023-07-05 DIAGNOSIS — L08.9 INFECTED INSECT BITE OF BACK, LEFT, INITIAL ENCOUNTER: Primary | ICD-10-CM

## 2023-07-05 DIAGNOSIS — Z86.14 HISTORY OF MRSA INFECTION: ICD-10-CM

## 2023-07-05 PROCEDURE — 99214 OFFICE O/P EST MOD 30 MIN: CPT | Performed by: NURSE PRACTITIONER

## 2023-07-05 RX ORDER — TRIAMCINOLONE ACETONIDE 1 MG/G
CREAM TOPICAL 2 TIMES DAILY
Qty: 15 G | Refills: 0 | Status: SHIPPED | OUTPATIENT
Start: 2023-07-05 | End: 2023-07-12

## 2023-07-05 RX ORDER — DOXYCYCLINE 100 MG/1
100 CAPSULE ORAL 2 TIMES DAILY
Qty: 14 CAPSULE | Refills: 0 | Status: SHIPPED | OUTPATIENT
Start: 2023-07-05 | End: 2023-07-12

## 2023-07-05 NOTE — PROGRESS NOTES
Chief Complaint   Patient presents with     Urgent Care     Derm Problem     REDNESS ON LEFT SIDE/BACK AREA X6DAYS, NO PAIN, MILD ITCHINESS, APPLYING HYDROCORTISONE CREAM-NOT HELPING     SUBJECTIVE:  Coral Painting is a 52 year old female presenting with a rash on her left posterior back flank area for 6 days.  It is red slightly itchy and quite large.  She believes it started from a bug bite, but does not remember this happening.  Hard to see and also watch for spreading.  She declines fevers sweats chills arthralgias nausea pins-and-needles shooting pain pus.  Relevant history of type 1 diabetes MRSA needing hospitalization for abscess.    Past Medical History:   Diagnosis Date     Type 1 Diabetes Mellitus     Created by Conversion Replacement Utility updated for latest IMO load      B-D U/F 31G X 8 MM insulin pen needle, USE AS DIRECTED FOR INJECTIONS 4 TIMES DAILY  blood glucose (ONETOUCH ULTRA) test strip, USE TO TEST FOUR TIMES A DAY  blood glucose monitoring (NO BRAND SPECIFIED) meter device kit, Use to test blood sugar 4 times daily or as directed. Any covered brand.  Continuous Blood Gluc  (DEXCOM G6 ) SUJEY, Use to read blood sugars as per 's instructions.  Continuous Blood Gluc Sensor (DEXCOM G6 SENSOR) MISC, CHANGE EVERY 10 DAYS.  Continuous Blood Gluc Transmit (DEXCOM G6 TRANSMITTER) MISC, Change every 3 months.  insulin glargine U-300 (TOUJEO SOLOSTAR) 300 UNIT/ML (1 units dial) pen, Use as directed up to 30 units daily.  Insulin Lispro-aabc, 1 U Dial, (LYUMJEV KWIKPEN) 100 UNIT/ML SOPN, Inject 60 Units Subcutaneous daily Use as directed up to 60 units daily.  insulin pen needle 31G X 8 MM, Use 4 times daily  KETOSTIX test strip, Use as directed in case of high glucose, vomiting or illness.  rosuvastatin (CRESTOR) 20 MG tablet, Take 1 tablet (20 mg) by mouth daily    No current facility-administered medications on file prior to visit.    Social History     Tobacco Use      Smoking status: Never     Smokeless tobacco: Never   Substance Use Topics     Alcohol use: Yes     Alcohol/week: 3.0 standard drinks of alcohol     Types: 3 Standard drinks or equivalent per week     No Known Allergies    Review of Systems   All systems negative except for those listed above in HPI.    OBJECTIVE:   /84   Pulse 92   Temp 98.2  F (36.8  C) (Oral)   Resp 12   SpO2 96%      Physical Exam  Vitals reviewed.   Constitutional:       Appearance: Normal appearance.   HENT:      Head: Normocephalic and atraumatic.   Cardiovascular:      Rate and Rhythm: Normal rate.   Pulmonary:      Effort: Pulmonary effort is normal.   Musculoskeletal:         General: Normal range of motion.        Arms:       Comments: 5 x 10 cm area of erythema with pinpoint crusted over center bite ivan.  There is no bull's-eye erythema migrans or pustules.   Skin:     General: Skin is warm and dry.      Findings: Erythema, lesion and rash present.   Neurological:      General: No focal deficit present.      Mental Status: She is alert and oriented to person, place, and time.   Psychiatric:         Mood and Affect: Mood normal.         Behavior: Behavior normal.       ASSESSMENT:    ICD-10-CM    1. Infected insect bite of back, left, initial encounter  S20.462A triamcinolone (KENALOG) 0.1 % external cream    L08.9 doxycycline hyclate (VIBRAMYCIN) 100 MG capsule    W57.XXXA       2. Type 1 diabetes mellitus with other skin complication (H)  E10.628       3. History of MRSA infection  Z86.14         PLAN:     Large local reaction vs secondary bacterial infection to bug bite, possible spider  Concern for poor wound healing risk for bacterial infection being diabetic and MRSA history  Triamcinolone  If worse, going beyond green line, start doxy right away  Zyrtec, allegra or claratin once a day for at least 3 days  Benadryl at night  Cool compresses/ice packs  Large local reactions to insect bites typically consist of an itchy or  even painful area of redness, warmth, swelling and/or induration that ranges from a few cm to more than 10 cm in diameter. Large local reactions develop within hours of the bite, progress over 8 to 12 hours or more, and resolve within 3 to 10 days.  Watch for any high fevers, open areas of skin, pus/discharge presence. If these occur, be seen for evaluation in urgent care or emergency room.  Please follow up with primary care provider if not improving, worsening or new symptoms or for any adverse reactions to medications.    Follow up with primary care provider with any problems, questions or concerns or if symptoms worsen or fail to improve. Patient agreed to plan and verbalized understanding.    Sayda Paz, SEJAL-BC  [facility]

## 2023-07-05 NOTE — PATIENT INSTRUCTIONS
Large local reaction vs secondary bacterial infection to bug bite, spider?  Triamcinolone  If worse, going beyond green line, start doxy  Zyrtec, allegra or claratin once a day for at least 3 days  Benadryl at night  Cool compresses/ice packs  Large local reactions to insect bites typically consist of an itchy or even painful area of redness, warmth, swelling and/or induration that ranges from a few cm to more than 10 cm in diameter. Large local reactions develop within hours of the bite, progress over 8 to 12 hours or more, and resolve within 3 to 10 days.  Watch for any high fevers, open areas of skin, pus/discharge presence. If these occur, be seen for evaluation in urgent care or emergency room.  Please follow up with primary care provider if not improving, worsening or new symptoms or for any adverse reactions to medications.

## 2023-08-25 NOTE — PROGRESS NOTES
Outcome for 08/25/23 2:58 PM: [employer] message sent  Hyacinth SANFORD Chávez  Outcome for 08/31/23 2:33 PM: Unable to leave voicemail  Dianelys Jolley LPN   Outcome for 09/01/23 10:25 AM: Unable to leave voicemail  Dianelys Jolley LPN   Outcome for 09/05/23 2:41 PM:  Patient unable to upload dexcom data. Will need to schedule nurse visit for troubleshooting.   Dianelys Jolley LPN     Virtual Visit Details    Type of service:  Video Visit     Originating Location (pt. Location): Home    Distant Location (provider location):  Off-site  Platform used for Video Visit: Rob        This 52-year-old woman with longstanding type 1 diabetes was seen in follow-up.  She is comanaged with Melonie Coughlin who saw her a few months ago.  She currently is taking Tresiba 28 units at about 9:30 in the evening as well as Lyumjev 3 units for each 15 g of carb. She does not think the Lyumjev acts much differently than her old mealtime insulin.  She is thinking about taking it 10 minutes before her meals to see if it works better.  She is almost out of her Tresiba and will be switching to Toujeo because this is what her insurance company covers.  She is wearing her Dexcom daily but has had trouble paring it with the web.  It says there is a prescription error.  She is not sure what that means and will call customer service.  Overall she feels well.  She exercises by walking but admits she is not very active.  She is due to see her eye doctor in a couple of months and has no retinopathy.  She will have some trace edema at the end of the day but disappears by the morning.  This has been present for some time.  Its not worse.  She denies chest pain or shortness of breath.  She stopped having menstrual cycles about a year ago.  She has no concerns today.  Current Outpatient Medications   Medication    B-D U/F 31G X 8 MM insulin pen needle    blood glucose (ONETOUCH ULTRA) test strip    blood glucose monitoring (NO BRAND SPECIFIED) meter device kit     "Continuous Blood Gluc  (DEXCOM G6 ) SUJEY    Continuous Blood Gluc Sensor (DEXCOM G6 SENSOR) MISC    Continuous Blood Gluc Transmit (DEXCOM G6 TRANSMITTER) MISC    insulin glargine U-300 (TOUJEO SOLOSTAR) 300 UNIT/ML (1 units dial) pen    Insulin Lispro-aabc, 1 U Dial, (BRIANSURJIT VANESAPEN) 100 UNIT/ML SOPN    insulin pen needle 31G X 8 MM    KETOSTIX test strip    rosuvastatin (CRESTOR) 20 MG tablet     No current facility-administered medications for this visit.                7/5/2023  1102 Most Recent Value      Temp: 98.2  F (36.8  C) 98.2  F (36.8  C)  as of 7/5/2023     Pulse: 92 92  as of 7/5/2023     BP: 125/84 125/84  as of 7/5/2023     Resp: 12 12  as of 7/5/2023     SpO2: 96 % 96%  as of 7/5/2023     Body Mass Index:  None     Systolic (Patient Repo...: -- Not taken     Diastolic (Patient Rep...: -- Not taken     Height: -- 1.547 m (5' 0.91\")  as of 5/8/2023     Weight: -- 91.5 kg (201 lb 12.8 oz)  as of 5/8/2023     Body Surface Area:  1.98 m   1.547 m (5' 0.91\")  as of 5/8/2023  91.5 kg (201 lb 12.8 oz)  as of 5/8/2023       On exam she is in no acute distress.  Mood is neutral.    Recent Labs   Lab Test 09/02/23  1055 04/21/23  0717 08/25/22  0706 02/21/22  0916 11/11/21  0913 08/13/20  1645 08/13/20  1638 01/21/20  0000 09/16/19  0818 09/16/19  0000   A1C 7.1* 6.5* 6.8*   < > 6.6*   < > 7.0*  --   --   --    HEMOGLOBINA1  --   --   --   --   --   --   --  6.3*  --  7.1*   TSH  --   --  3.03  --   --   --  2.05  --    < >  --    LDL  --  48  --   --  72  --  50  --    < >  --    HDL  --  58  --   --  50  --  72  --    < >  --    TRIG  --  110  --   --  74  --  61  --    < >  --    CR  --  0.83  --   --  0.67   < > 0.78  --    < >  --    MICROL  --  33.7  --   --  14   < >  --   --    < >  --     < > = values in this interval not displayed.     Collected: 04/21/23 0717   Result status: Final   Resulting lab: [facility]   Reference range: 0.00 - 25.00 mg/g Cr   Value: 17.83   Comment: " Microalbuminuria is defined as an albumin:creatinine ratio of 17 to 299 for males and 25 to 299 for females. A ratio of albumin:creatinine of 300 or higher is indicative of overt proteinuria.  Due to biologic variability, positive results should be confirmed by a second, first-morning random or 24-hour timed urine specimen. If there is discrepancy, a third specimen is recommended. When 2 out of 3 results are in the microalbuminuria range, this is evidence for incipient nephropathy and warrants increased efforts at glucose control, blood pressure control, and institution of therapy with an angiotensin-converting-enzyme (ACE) inhibitor (if the patient can tolerate it).         Assessment and plan:    1.  Diabetes control.  Her A1c is nearly to target.  I made no changes in her doses since she will be switching from Tresiba to Toujeo.  We will have her start at the same dose but I told her she may need to make some adjustments.  She can certainly contact me by [employer] if she wants some help.  She will call Dexcom to see if she can connect her device to the web.  She will continue to play with the Lyumjev to see if she likes it better but if not, she may want to go back to her old insulin.    2.  Diabetes complications.  She has been screened and is as up-to-date.  She has no complications.    3.CVD risk.  Blood pressure has been well controlled.  She is on a statin with a good LDL.    Follow-up in 3 months with Melonie Coughlin and with me in 6 to 8 months.    I spent 15 minutes on the video visit with the patient (start time 3: 00 and end time 3: 1 5).  On the day of visit I spent an additional 10 minutes trying to access her Dexcom data on the web, reviewing her lab data, and doing documentation.    Henna Ness MD

## 2023-08-31 ENCOUNTER — TELEPHONE (OUTPATIENT)
Dept: ENDOCRINOLOGY | Facility: CLINIC | Age: 53
End: 2023-08-31
Payer: COMMERCIAL

## 2023-08-31 ENCOUNTER — DOCUMENTATION ONLY (OUTPATIENT)
Dept: INTERNAL MEDICINE | Facility: CLINIC | Age: 53
End: 2023-08-31
Payer: COMMERCIAL

## 2023-08-31 DIAGNOSIS — E10.9 TYPE 1 DIABETES MELLITUS WITHOUT COMPLICATION (H): Primary | ICD-10-CM

## 2023-08-31 NOTE — PROGRESS NOTES
Coral Painting has an upcoming lab appointment.        Future Appointments   Date Time Provider Department Center   9/2/2023 10:45 AM [facility]    9/5/2023  3:00 PM Henna Ness MD [facility]   12/12/2023  7:00 AM Melonie Coughlin PA-C [facility]       The appointment note says: per Dr. Ness or Ced    There is no Lab order available.      Please review and place future orders as appropriate.  If no Lab order will be placed, please advise patient.      Also for consideration: HMPO    Health Maintenance Due   Topic    ANNUAL REVIEW OF HM ORDERS     HIV SCREENING     HEPATITIS C SCREENING     BMP        Thanks,    Sandrine Ibrahim

## 2023-08-31 NOTE — TELEPHONE ENCOUNTER
Called patient and unable to leave voicemail. Patient has an appointment on 9/5/2023. Need patient to upload their Dexcom device to site for provider to review prior to their appointment.    Dianelys Jolley LPN 08/31/23 2:32 PM

## 2023-09-01 DIAGNOSIS — E10.9 WELL CONTROLLED TYPE 1 DIABETES MELLITUS (H): ICD-10-CM

## 2023-09-01 NOTE — TELEPHONE ENCOUNTER
Called patient and unable to leave voicemail. Patient has an appointment on 9/5/2023. Need patient to upload their Dexcom device to site for provider to review prior to their appointment.    Dianelys Jolley LPN 09/01/23 10:25 AM

## 2023-09-02 ENCOUNTER — LAB (OUTPATIENT)
Dept: LAB | Facility: CLINIC | Age: 53
End: 2023-09-02
Payer: COMMERCIAL

## 2023-09-02 DIAGNOSIS — E10.9 TYPE 1 DIABETES MELLITUS WITHOUT COMPLICATION (H): ICD-10-CM

## 2023-09-02 LAB — HBA1C MFR BLD: 7.1 % (ref 0–5.6)

## 2023-09-02 PROCEDURE — 36415 COLL VENOUS BLD VENIPUNCTURE: CPT

## 2023-09-02 PROCEDURE — 83036 HEMOGLOBIN GLYCOSYLATED A1C: CPT

## 2023-09-02 RX ORDER — PROCHLORPERAZINE 25 MG/1
SUPPOSITORY RECTAL
Qty: 9 EACH | Refills: 3 | Status: SHIPPED | OUTPATIENT
Start: 2023-09-02 | End: 2024-08-06

## 2023-09-02 NOTE — TELEPHONE ENCOUNTER
Sensor  9 each 3 7/23/2022 5/8/2023  [facility] [place]    Melonie Coughlin PA-C  Endocrinology, Diabetes, and Metabolism

## 2023-09-05 ENCOUNTER — VIRTUAL VISIT (OUTPATIENT)
Dept: ENDOCRINOLOGY | Facility: CLINIC | Age: 53
End: 2023-09-05
Payer: COMMERCIAL

## 2023-09-05 DIAGNOSIS — E10.9 TYPE 1 DIABETES MELLITUS WITHOUT COMPLICATION (H): Primary | ICD-10-CM

## 2023-09-05 PROCEDURE — 99213 OFFICE O/P EST LOW 20 MIN: CPT | Mod: VID | Performed by: INTERNAL MEDICINE

## 2023-09-05 NOTE — PATIENT INSTRUCTIONS
Call Dexcom customer service to see about connecting your device to the lab    Have your A1c done before the next visit.Please contact us to schedule at any of our [place] lab locations  Call [phone]), select option [phone]     Let me know if you are having trouble with your blood sugars when you switch from Tresiba to Toujeo or if you want to switch back to NovoLog/Humalog

## 2023-09-05 NOTE — LETTER
9/5/2023       RE: Coral Painting  [address]  [facility] [place] [address]     Dear Colleague,    Thank you for referring your patient, Coral Painting, to the [facility] [place] at [facility]. Please see a copy of my visit note below.    Outcome for 08/25/23 2:58 PM: [employer] message sent  Hyacinth Chávez MA  Outcome for 08/31/23 2:33 PM: Unable to leave voicemail  Dianelys Jolley LPN   Outcome for 09/01/23 10:25 AM: Unable to leave voicemail  Dianelys Jolley LPN   Outcome for 09/05/23 2:41 PM:  Patient unable to upload dexcom data. Will need to schedule nurse visit for troubleshooting.   Dianelys Jolley LPN     Virtual Visit Details    Type of service:  Video Visit     Originating Location (pt. Location): Home    Distant Location (provider location):  Off-site  Platform used for Video Visit: Rob        This 52-year-old woman with longstanding type 1 diabetes was seen in follow-up.  She is comanaged with Melonie Coughlin who saw her a few months ago.  She currently is taking Tresiba 28 units at about 9:30 in the evening as well as Lyumjev 3 units for each 15 g of carb. She does not think the Lyumjev acts much differently than her old mealtime insulin.  She is thinking about taking it 10 minutes before her meals to see if it works better.  She is almost out of her Tresiba and will be switching to Toujeo because this is what her insurance company covers.  She is wearing her Dexcom daily but has had trouble paring it with the web.  It says there is a prescription error.  She is not sure what that means and will call customer service.  Overall she feels well.  She exercises by walking but admits she is not very active.  She is due to see her eye doctor in a couple of months and has no retinopathy.  She will have some trace edema at the end of the day but disappears by the morning.  This has been present for some time.  Its not worse.   "She denies chest pain or shortness of breath.  She stopped having menstrual cycles about a year ago.  She has no concerns today.  Current Outpatient Medications   Medication    B-D U/F 31G X 8 MM insulin pen needle    blood glucose (ONETOUCH ULTRA) test strip    blood glucose monitoring (NO BRAND SPECIFIED) meter device kit    Continuous Blood Gluc  (DEXCOM G6 ) SUJEY    Continuous Blood Gluc Sensor (DEXCOM G6 SENSOR) MISC    Continuous Blood Gluc Transmit (DEXCOM G6 TRANSMITTER) MISC    insulin glargine U-300 (TOUJEO SOLOSTAR) 300 UNIT/ML (1 units dial) pen    Insulin Lispro-aabc, 1 U Dial, (ALEXANDRAUMSURJIT PRABHAKARIKPEN) 100 UNIT/ML SOPN    insulin pen needle 31G X 8 MM    KETOSTIX test strip    rosuvastatin (CRESTOR) 20 MG tablet     No current facility-administered medications for this visit.                7/5/2023  1102 Most Recent Value      Temp: 98.2  F (36.8  C) 98.2  F (36.8  C)  as of 7/5/2023     Pulse: 92 92  as of 7/5/2023     BP: 125/84 125/84  as of 7/5/2023     Resp: 12 12  as of 7/5/2023     SpO2: 96 % 96%  as of 7/5/2023     Body Mass Index:  None     Systolic (Patient Repo...: -- Not taken     Diastolic (Patient Rep...: -- Not taken     Height: -- 1.547 m (5' 0.91\")  as of 5/8/2023     Weight: -- 91.5 kg (201 lb 12.8 oz)  as of 5/8/2023     Body Surface Area:  1.98 m   1.547 m (5' 0.91\")  as of 5/8/2023  91.5 kg (201 lb 12.8 oz)  as of 5/8/2023       On exam she is in no acute distress.  Mood is neutral.    Recent Labs   Lab Test 09/02/23  1055 04/21/23  0717 08/25/22  0706 02/21/22  0916 11/11/21  0913 08/13/20  1645 08/13/20  1638 01/21/20  0000 09/16/19  0818 09/16/19  0000   A1C 7.1* 6.5* 6.8*   < > 6.6*   < > 7.0*  --   --   --    HEMOGLOBINA1  --   --   --   --   --   --   --  6.3*  --  7.1*   TSH  --   --  3.03  --   --   --  2.05  --    < >  --    LDL  --  48  --   --  72  --  50  --    < >  --    HDL  --  58  --   --  50  --  72  --    < >  --    TRIG  --  110  --   --  74  --  61  --  "   < >  --    CR  --  0.83  --   --  0.67   < > 0.78  --    < >  --    MICROL  --  33.7  --   --  14   < >  --   --    < >  --     < > = values in this interval not displayed.     Collected: 04/21/23 0717   Result status: Final   Resulting lab:  LABORATORY   Reference range: 0.00 - 25.00 mg/g Cr   Value: 17.83   Comment: Microalbuminuria is defined as an albumin:creatinine ratio of 17 to 299 for males and 25 to 299 for females. A ratio of albumin:creatinine of 300 or higher is indicative of overt proteinuria.  Due to biologic variability, positive results should be confirmed by a second, first-morning random or 24-hour timed urine specimen. If there is discrepancy, a third specimen is recommended. When 2 out of 3 results are in the microalbuminuria range, this is evidence for incipient nephropathy and warrants increased efforts at glucose control, blood pressure control, and institution of therapy with an angiotensin-converting-enzyme (ACE) inhibitor (if the patient can tolerate it).         Assessment and plan:    1.  Diabetes control.  Her A1c is nearly to target.  I made no changes in her doses since she will be switching from Tresiba to Toujeo.  We will have her start at the same dose but I told her she may need to make some adjustments.  She can certainly contact me by [employer] if she wants some help.  She will call Dexcom to see if she can connect her device to the web.  She will continue to play with the Lyumjev to see if she likes it better but if not, she may want to go back to her old insulin.    2.  Diabetes complications.  She has been screened and is as up-to-date.  She has no complications.    3.CVD risk.  Blood pressure has been well controlled.  She is on a statin with a good LDL.    Follow-up in 3 months with Melonie Coughlin and with me in 6 to 8 months.    I spent 15 minutes on the video visit with the patient (start time 3: 00 and end time 3: 1 5).  On the day of visit I spent an additional 10  minutes trying to access her Dexcom data on the web, reviewing her lab data, and doing documentation.  Henna Ness MD

## 2023-09-12 ENCOUNTER — TELEPHONE (OUTPATIENT)
Dept: ENDOCRINOLOGY | Facility: CLINIC | Age: 53
End: 2023-09-12
Payer: COMMERCIAL

## 2023-09-14 DIAGNOSIS — E10.9 WELL CONTROLLED TYPE 1 DIABETES MELLITUS (H): ICD-10-CM

## 2023-09-14 RX ORDER — PEN NEEDLE, DIABETIC 31 GX5/16"
NEEDLE, DISPOSABLE MISCELLANEOUS
Qty: 360 EACH | Refills: 3 | Status: SHIPPED | OUTPATIENT
Start: 2023-09-14

## 2023-09-14 NOTE — TELEPHONE ENCOUNTER
9/5/2023  [facility] [place]     Henna Ness MD  Endocrinology, Diabetes, and Metabolism

## 2023-11-10 ENCOUNTER — TRANSFERRED RECORDS (OUTPATIENT)
Dept: HEALTH INFORMATION MANAGEMENT | Facility: CLINIC | Age: 53
End: 2023-11-10
Payer: COMMERCIAL

## 2023-11-10 LAB — RETINOPATHY: NEGATIVE

## 2023-12-02 ENCOUNTER — LAB (OUTPATIENT)
Dept: LAB | Facility: CLINIC | Age: 53
End: 2023-12-02
Payer: COMMERCIAL

## 2023-12-02 DIAGNOSIS — E10.9 TYPE 1 DIABETES MELLITUS WITHOUT COMPLICATION (H): ICD-10-CM

## 2023-12-02 LAB — HBA1C MFR BLD: 8.2 % (ref 0–5.6)

## 2023-12-02 PROCEDURE — 83036 HEMOGLOBIN GLYCOSYLATED A1C: CPT

## 2023-12-02 PROCEDURE — 36415 COLL VENOUS BLD VENIPUNCTURE: CPT

## 2023-12-05 NOTE — PROGRESS NOTES
Outcome for 12/05/23 7:56 AM: [employer] message sent  Hyacinth Chávez MA  Outcome for 12/08/23 1:01 PM: Left Voicemail   Hyacinth Chávez MA  Outcome for 12/11/23 10:16 AM:  Patient unable to upload dexcom at home. Has been in contact with dexcom support. Offered patient to come into clinic for upload.   Dianelys Jolley LPN     Start time: 0703  End time:  0733  Provider location: off site- home  Patient location: off site- home.    HPI:  Ms. Painting is a 52 yo woman here for follow up of type 1 diabetes.  She also sees Dr. Ness. Since her last visit, her glucose has been running higher.  Her insurance required her to switch from tresiba to toujeo and she has required much more (40 units vs 30 previously).  It took her a while to ramp up her dose, so her glucose ran high for a while.  A1c is up to 8.2%.  Glucose is finally improving.  Unfortunately, she was not able to upload her dexcom sensor today.  She likes the lyumjev.  No severe hypoglycemia.  She has been frustrated with highs in the morning.  Glucose steadily climbs overnight even when she does not have a bedtime snack.      She is currently taking Tresiba 40 units daily.  She takes Novolog 3 units/15g CHO with meals plus correction of 1/25 over 175 mg/dL (average 10-15 units at a time).  She sometimes backs off her her mealtime coverage a bit..   We previously talked about the In-pen, and pumps but she has privacy concerns and prefers not to have any additional things attached to her body.  She continues using the dexcom sensor.       Had eye exam- no changes in retinopathy- mild.      Trying to eat less.  Has salads every week.  Likes fruit.      AM readings have been varying.  Anywhere between 120-160-170 mg/dL.  Between 5-9am even if she is not eating.      Typical diet:   Breakfast- cereal with banana (cheerios or grape nuts)  In the summer, she feels like she eats better.    Sometimes eats salads all week, more fruit in the summer.    Tuna  sandwich, more pork lately. She doesn't like a lot of vegetables, mostly fruits.     She had a recent eye exam with no changes, per her report.      She has no other concerns today.       RESULTS  Lab Results   Component Value Date    A1C 8.2 (H) 12/02/2023    A1C 7.1 (H) 09/02/2023    A1C 6.5 (H) 04/21/2023    A1C 6.8 (H) 08/25/2022    A1C 7.3 (H) 05/24/2022    A1C 8.1 (H) 03/22/2021    A1C 7.0 (H) 08/13/2020    A1C 7.7 (H) 11/06/2017    A1C 6.6 (A) 08/05/2013    A1C 7.5 (A) 02/04/2013    HEMOGLOBINA1 6.3 (A) 01/21/2020    HEMOGLOBINA1 7.1 (A) 09/16/2019    HEMOGLOBINA1 6.6 (A) 06/03/2019    HEMOGLOBINA1 7.2 (A) 08/20/2018    HEMOGLOBINA1 6.9 (A) 05/07/2018       TSH   Date Value Ref Range Status   08/25/2022 3.03 0.40 - 4.00 mU/L Final   08/13/2020 2.05 0.40 - 4.00 mU/L Final   09/16/2019 2.29 0.40 - 4.00 mU/L Final   08/14/2017 2.17 0.40 - 4.00 mU/L Final   10/24/2016 2.80 0.40 - 4.00 mU/L Final   08/13/2007 1.86 0.4 - 5.0 mU/L Final     T4 Free   Date Value Ref Range Status   08/13/2007 1.02 0.70 - 1.85 ng/dL Final       ALT   Date Value Ref Range Status   09/16/2019 15 0 - 50 U/L Final   06/04/2012 12 0 - 50 U/L Final   ]    Recent Labs   Lab Test 04/21/23  0717 11/11/21  0913   CHOL 128 137   HDL 58 50   LDL 48 72   TRIG 110 74       Lab Results   Component Value Date     11/11/2021     03/22/2021      Lab Results   Component Value Date    POTASSIUM 4.6 11/11/2021    POTASSIUM 4.1 03/22/2021     Lab Results   Component Value Date    CHLORIDE 106 11/11/2021    CHLORIDE 106 03/22/2021     Lab Results   Component Value Date    MICHELLE 8.5 11/11/2021    MICHELLE 8.7 03/22/2021     Lab Results   Component Value Date    CO2 22 11/11/2021    CO2 24 03/22/2021     Lab Results   Component Value Date    BUN 10 11/11/2021    BUN 12 03/22/2021     Lab Results   Component Value Date    CR 0.83 04/21/2023    CR 0.72 03/22/2021       GFR Estimate   Date Value Ref Range Status   04/21/2023 84 >60 mL/min/1.73m2 Final      Comment:     eGFR calculated using 2021 CKD-EPI equation.   11/11/2021 >90 >60 mL/min/1.73m2 Final     Comment:     As of July 11, 2021, eGFR is calculated by the CKD-EPI creatinine equation, without race adjustment. eGFR can be influenced by muscle mass, exercise, and diet. The reported eGFR is an estimation only and is only applicable if the renal function is stable.   03/22/2021 >90 >60 mL/min/[1.73_m2] Final     Comment:     Non  GFR Calc  Starting 12/18/2018, serum creatinine based estimated GFR (eGFR) will be   calculated using the Chronic Kidney Disease Epidemiology Collaboration   (CKD-EPI) equation.     08/13/2020 88 >60 mL/min/[1.73_m2] Final     Comment:     Non  GFR Calc  Starting 12/18/2018, serum creatinine based estimated GFR (eGFR) will be   calculated using the Chronic Kidney Disease Epidemiology Collaboration   (CKD-EPI) equation.     09/16/2019 >90 >60 mL/min/[1.73_m2] Final     Comment:     Non  GFR Calc  Starting 12/18/2018, serum creatinine based estimated GFR (eGFR) will be   calculated using the Chronic Kidney Disease Epidemiology Collaboration   (CKD-EPI) equation.       GFR Estimate If Black   Date Value Ref Range Status   03/22/2021 >90 >60 mL/min/[1.73_m2] Final     Comment:      GFR Calc  Starting 12/18/2018, serum creatinine based estimated GFR (eGFR) will be   calculated using the Chronic Kidney Disease Epidemiology Collaboration   (CKD-EPI) equation.     08/13/2020 >90 >60 mL/min/[1.73_m2] Final     Comment:      GFR Calc  Starting 12/18/2018, serum creatinine based estimated GFR (eGFR) will be   calculated using the Chronic Kidney Disease Epidemiology Collaboration   (CKD-EPI) equation.     09/16/2019 >90 >60 mL/min/[1.73_m2] Final     Comment:      GFR Calc  Starting 12/18/2018, serum creatinine based estimated GFR (eGFR) will be   calculated using the Chronic Kidney Disease Epidemiology  "Collaboration   (CKD-EPI) equation.         Lab Results   Component Value Date    MICROL 33.7 04/21/2023    MICROL 14 11/11/2021    MICROL 11 08/13/2020     No results found for: \"MICROALBUMIN\"  No results found for: \"CPEPT\", \"GADAB\", \"ISCAB\"    Vitamin B12   Date Value Ref Range Status   11/11/2021 244 193 - 986 pg/mL Final   ]    Most recent eye exam date: : Not Found     Assessment   Assessment/Plan:      1.  Type 1 DM-   Coral's glucose climbed significantly since switching from Tresiba to Toujeo.  A1c is up to 8.2%, but recently glucose has improved.  Offered to write a letter to get Tresiba covered again, but she said she is fine on the higher dose of Toujeo for now.  Discussed potential upgrade to dexcom G7, but she is comfortable with her current system.  Suggested in person visits in the future, as we have difficulty with accessing her data.  We have previously talked about dm technologies that might be helpful including In-pen and Omnipod 5 pump, but she is concerned about privacy and also does not like to be connected to anything more than the dexcom.  We made the following plan today (instructions given to patient):      Increase Toujeo to 42 units for 3-4 days.  If still above 100-120 in the AM increase to 44 units.      Relax your mealtime insulin by 1-2 units per meal.     Emergency issues: Here are some concerns you should contact us about.  -Vomiting: more than twice.  Please check ketones.  If positive, go to ER. Monitor glucose hourly.   -High glucose (over 300 mg/dL twice in a row): Please check ketones.  If ketones are negative, take an insulin correction and recheck glucose in 1 hour.  If glucose is not coming down, please call the clinic. If ketones are moderate or large, drink lots of water, take an insulin correction 1.5 times your usual correction, and recheck ketones in 1 hour.  If ketones are still present (or you are vomiting), go to the ER.  -Hypoglycemia (low glucose):   If glucose is " less than 70 mg/dL, treat with 15g carb (4 glucose tablets), recheck glucose in 15 minutes.  If low again, repeat.   If glucose is less than 54 mg/dL, treat with 30g carb, recheck glucose in 15 minutes.  If low again, repeat.  Keep glucagon in your home in case of severe hypoglycemia and train someone how to use this.    Emergency kit (please ensure you always have these with you):   Glucose tablets  Glucagon  Insulin  Syringes/needles   Extra infusion set (if on a pump)  Ketone strips    Contact information:   If you have concerns, please send me a [employer] message or call the clinic at [phone].  For more urgent concerns, please call [phone] after hours/weekends and ask to speak with the endocrinologist on call.      Please let me know if you are having low blood sugars less than 70 or over 350 mg/dL.  Do not wait until your next appointment if this is happening.      2.  Risk factors- BP under good control.  Creatinine normal.  No MA.  Lipids well controlled on statin. Had recent eye exam.      3.  F/U in 3 months with Dr. Ness.  Call sooner with concerns.      32 minutes spent on the date of the encounter doing chart review, review of test results, review of continuous glucose sensor, interpretation of glucose data, patient visit and documentation, counseling/coordination of care, and discussion of follow up plan for worsening hyper and hypoglycemia.  The patient understood and is satisfied with today's visit.       Melonie Coughlin PA-C, MPAS   [facility]  Department of Medicine  Division of Endocrinology and Diabetes

## 2023-12-08 ENCOUNTER — TELEPHONE (OUTPATIENT)
Dept: ENDOCRINOLOGY | Facility: CLINIC | Age: 53
End: 2023-12-08
Payer: COMMERCIAL

## 2023-12-08 NOTE — TELEPHONE ENCOUNTER
Called patient and left voicemail. Patient has an appointment on  12/12/23 . Need patient to upload their Dexcom device to site for provider to review prior to their appointment.  Hyacinth Chávez MA

## 2023-12-12 ENCOUNTER — VIRTUAL VISIT (OUTPATIENT)
Dept: ENDOCRINOLOGY | Facility: CLINIC | Age: 53
End: 2023-12-12
Payer: COMMERCIAL

## 2023-12-12 DIAGNOSIS — E10.9 WELL CONTROLLED TYPE 1 DIABETES MELLITUS (H): ICD-10-CM

## 2023-12-12 PROCEDURE — 99214 OFFICE O/P EST MOD 30 MIN: CPT | Mod: VID | Performed by: PHYSICIAN ASSISTANT

## 2023-12-12 RX ORDER — ROSUVASTATIN CALCIUM 20 MG/1
20 TABLET, COATED ORAL DAILY
Qty: 90 TABLET | Refills: 3 | Status: SHIPPED | OUTPATIENT
Start: 2023-12-12

## 2023-12-12 RX ORDER — INSULIN GLARGINE 300 U/ML
INJECTION, SOLUTION SUBCUTANEOUS
Qty: 15 ML | Refills: 3 | Status: SHIPPED | OUTPATIENT
Start: 2023-12-12 | End: 2023-12-15

## 2023-12-12 NOTE — LETTER
12/12/2023       RE: Coral Painting  [address]  [facility] [address]     Dear Colleague,    Thank you for referring your patient, Coral Painting, to the [facility] [place] at [facility]. Please see a copy of my visit note below.    Outcome for 12/05/23 7:56 AM: [employer] message sent  Hyacinth Chávez MA  Outcome for 12/08/23 1:01 PM: Left Voicemail   Hyacinth Chávez MA  Outcome for 12/11/23 10:16 AM:  Patient unable to upload dexcom at home. Has been in contact with dexcom support. Offered patient to come into clinic for upload.   Dianelys Jolley LPN     Start time: 0703  End time:  0733  Provider location: off site- home  Patient location: off site- home.    HPI:  Ms. Painting is a 52 yo woman here for follow up of type 1 diabetes.  She also sees Dr. Ness. Since her last visit, her glucose has been running higher.  Her insurance required her to switch from tresiba to toujeo and she has required much more (40 units vs 30 previously).  It took her a while to ramp up her dose, so her glucose ran high for a while.  A1c is up to 8.2%.  Glucose is finally improving.  Unfortunately, she was not able to upload her dexcom sensor today.  She likes the lyumjev.  No severe hypoglycemia.  She has been frustrated with highs in the morning.  Glucose steadily climbs overnight even when she does not have a bedtime snack.      She is currently taking Tresiba 40 units daily.  She takes Novolog 3 units/15g CHO with meals plus correction of 1/25 over 175 mg/dL (average 10-15 units at a time).  She sometimes backs off her her mealtime coverage a bit..   We previously talked about the In-pen, and pumps but she has privacy concerns and prefers not to have any additional things attached to her body.  She continues using the dexcom sensor.       Had eye exam- no changes in retinopathy- mild.      Trying to eat less.  Has salads every  week.  Likes fruit.      AM readings have been varying.  Anywhere between 120-160-170 mg/dL.  Between 5-9am even if she is not eating.      Typical diet:   Breakfast- cereal with banana (cheerios or grape nuts)  In the summer, she feels like she eats better.    Sometimes eats salads all week, more fruit in the summer.    Tuna sandwich, more pork lately. She doesn't like a lot of vegetables, mostly fruits.     She had a recent eye exam with no changes, per her report.      She has no other concerns today.       RESULTS  Lab Results   Component Value Date    A1C 8.2 (H) 12/02/2023    A1C 7.1 (H) 09/02/2023    A1C 6.5 (H) 04/21/2023    A1C 6.8 (H) 08/25/2022    A1C 7.3 (H) 05/24/2022    A1C 8.1 (H) 03/22/2021    A1C 7.0 (H) 08/13/2020    A1C 7.7 (H) 11/06/2017    A1C 6.6 (A) 08/05/2013    A1C 7.5 (A) 02/04/2013    HEMOGLOBINA1 6.3 (A) 01/21/2020    HEMOGLOBINA1 7.1 (A) 09/16/2019    HEMOGLOBINA1 6.6 (A) 06/03/2019    HEMOGLOBINA1 7.2 (A) 08/20/2018    HEMOGLOBINA1 6.9 (A) 05/07/2018       TSH   Date Value Ref Range Status   08/25/2022 3.03 0.40 - 4.00 mU/L Final   08/13/2020 2.05 0.40 - 4.00 mU/L Final   09/16/2019 2.29 0.40 - 4.00 mU/L Final   08/14/2017 2.17 0.40 - 4.00 mU/L Final   10/24/2016 2.80 0.40 - 4.00 mU/L Final   08/13/2007 1.86 0.4 - 5.0 mU/L Final     T4 Free   Date Value Ref Range Status   08/13/2007 1.02 0.70 - 1.85 ng/dL Final       ALT   Date Value Ref Range Status   09/16/2019 15 0 - 50 U/L Final   06/04/2012 12 0 - 50 U/L Final   ]    Recent Labs   Lab Test 04/21/23  0717 11/11/21  0913   CHOL 128 137   HDL 58 50   LDL 48 72   TRIG 110 74       Lab Results   Component Value Date     11/11/2021     03/22/2021      Lab Results   Component Value Date    POTASSIUM 4.6 11/11/2021    POTASSIUM 4.1 03/22/2021     Lab Results   Component Value Date    CHLORIDE 106 11/11/2021    CHLORIDE 106 03/22/2021     Lab Results   Component Value Date    MICHELLE 8.5 11/11/2021    MICHELLE 8.7 03/22/2021     Lab  Results   Component Value Date    CO2 22 11/11/2021    CO2 24 03/22/2021     Lab Results   Component Value Date    BUN 10 11/11/2021    BUN 12 03/22/2021     Lab Results   Component Value Date    CR 0.83 04/21/2023    CR 0.72 03/22/2021       GFR Estimate   Date Value Ref Range Status   04/21/2023 84 >60 mL/min/1.73m2 Final     Comment:     eGFR calculated using 2021 CKD-EPI equation.   11/11/2021 >90 >60 mL/min/1.73m2 Final     Comment:     As of July 11, 2021, eGFR is calculated by the CKD-EPI creatinine equation, without race adjustment. eGFR can be influenced by muscle mass, exercise, and diet. The reported eGFR is an estimation only and is only applicable if the renal function is stable.   03/22/2021 >90 >60 mL/min/[1.73_m2] Final     Comment:     Non  GFR Calc  Starting 12/18/2018, serum creatinine based estimated GFR (eGFR) will be   calculated using the Chronic Kidney Disease Epidemiology Collaboration   (CKD-EPI) equation.     08/13/2020 88 >60 mL/min/[1.73_m2] Final     Comment:     Non  GFR Calc  Starting 12/18/2018, serum creatinine based estimated GFR (eGFR) will be   calculated using the Chronic Kidney Disease Epidemiology Collaboration   (CKD-EPI) equation.     09/16/2019 >90 >60 mL/min/[1.73_m2] Final     Comment:     Non  GFR Calc  Starting 12/18/2018, serum creatinine based estimated GFR (eGFR) will be   calculated using the Chronic Kidney Disease Epidemiology Collaboration   (CKD-EPI) equation.       GFR Estimate If Black   Date Value Ref Range Status   03/22/2021 >90 >60 mL/min/[1.73_m2] Final     Comment:      GFR Calc  Starting 12/18/2018, serum creatinine based estimated GFR (eGFR) will be   calculated using the Chronic Kidney Disease Epidemiology Collaboration   (CKD-EPI) equation.     08/13/2020 >90 >60 mL/min/[1.73_m2] Final     Comment:      GFR Calc  Starting 12/18/2018, serum creatinine based estimated GFR  "(eGFR) will be   calculated using the Chronic Kidney Disease Epidemiology Collaboration   (CKD-EPI) equation.     09/16/2019 >90 >60 mL/min/[1.73_m2] Final     Comment:      GFR Calc  Starting 12/18/2018, serum creatinine based estimated GFR (eGFR) will be   calculated using the Chronic Kidney Disease Epidemiology Collaboration   (CKD-EPI) equation.         Lab Results   Component Value Date    MICROL 33.7 04/21/2023    MICROL 14 11/11/2021    MICROL 11 08/13/2020     No results found for: \"MICROALBUMIN\"  No results found for: \"CPEPT\", \"GADAB\", \"ISCAB\"    Vitamin B12   Date Value Ref Range Status   11/11/2021 244 193 - 986 pg/mL Final   ]    Most recent eye exam date: : Not Found     Assessment   Assessment/Plan:      1.  Type 1 DM-   Coral's glucose climbed significantly since switching from Tresiba to Toujeo.  A1c is up to 8.2%, but recently glucose has improved.  Offered to write a letter to get Tresiba covered again, but she said she is fine on the higher dose of Toujeo for now.  Discussed potential upgrade to dexcom G7, but she is comfortable with her current system.  Suggested in person visits in the future, as we have difficulty with accessing her data.  We have previously talked about dm technologies that might be helpful including In-pen and Omnipod 5 pump, but she is concerned about privacy and also does not like to be connected to anything more than the dexcom.  We made the following plan today (instructions given to patient):      Increase Toujeo to 42 units for 3-4 days.  If still above 100-120 in the AM increase to 44 units.      Relax your mealtime insulin by 1-2 units per meal.     Emergency issues: Here are some concerns you should contact us about.  -Vomiting: more than twice.  Please check ketones.  If positive, go to ER. Monitor glucose hourly.   -High glucose (over 300 mg/dL twice in a row): Please check ketones.  If ketones are negative, take an insulin correction and recheck " glucose in 1 hour.  If glucose is not coming down, please call the clinic. If ketones are moderate or large, drink lots of water, take an insulin correction 1.5 times your usual correction, and recheck ketones in 1 hour.  If ketones are still present (or you are vomiting), go to the ER.  -Hypoglycemia (low glucose):   If glucose is less than 70 mg/dL, treat with 15g carb (4 glucose tablets), recheck glucose in 15 minutes.  If low again, repeat.   If glucose is less than 54 mg/dL, treat with 30g carb, recheck glucose in 15 minutes.  If low again, repeat.  Keep glucagon in your home in case of severe hypoglycemia and train someone how to use this.    Emergency kit (please ensure you always have these with you):   Glucose tablets  Glucagon  Insulin  Syringes/needles   Extra infusion set (if on a pump)  Ketone strips    Contact information:   If you have concerns, please send me a [employer] message or call the clinic at [phone].  For more urgent concerns, please call [phone] after hours/weekends and ask to speak with the endocrinologist on call.      Please let me know if you are having low blood sugars less than 70 or over 350 mg/dL.  Do not wait until your next appointment if this is happening.      2.  Risk factors- BP under good control.  Creatinine normal.  No MA.  Lipids well controlled on statin. Had recent eye exam.      3.  F/U in 3 months with Dr. Ness.  Call sooner with concerns.      32 minutes spent on the date of the encounter doing chart review, review of test results, review of continuous glucose sensor, interpretation of glucose data, patient visit and documentation, counseling/coordination of care, and discussion of follow up plan for worsening hyper and hypoglycemia.  The patient understood and is satisfied with today's visit.       Melonie Coughlin PA-C, MPAS   [facility]  Department of Medicine  Division of Endocrinology and Diabetes

## 2023-12-12 NOTE — NURSING NOTE
Is the patient currently in the state of [place]? YES    Visit mode:VIDEO    If the visit is dropped, the patient can be reconnected by: VIDEO VISIT: Send to e-mail at: [email or web address]    Will anyone else be joining the visit? NO  (If patient encounters technical issues they should call [phone]    How would you like to obtain your AVS? MyChart    Are changes needed to the allergy or medication list? No patient reported making no changes to her e-check in for visit, so VF did not review these lists again..    Reason for visit: Follow Up    Michelle NORRIS

## 2023-12-12 NOTE — PATIENT INSTRUCTIONS
Increase Toujeo to 42 units for 3-4 days.  If still above 100-120 in the AM increase to 44 units.      Relax your mealtime insulin by 1-2 units per meal.     Please let me know if you are having low blood sugars less than 70 or over 250 mg/dL.      If you have concerns, please send me a [employer] message M-Th, call the clinic at [phone], or call [phone] after hours/weekends and ask to speak with the endocrinologist on call.

## 2023-12-14 ENCOUNTER — MYC MEDICAL ADVICE (OUTPATIENT)
Dept: ENDOCRINOLOGY | Facility: CLINIC | Age: 53
End: 2023-12-14
Payer: COMMERCIAL

## 2023-12-14 NOTE — TELEPHONE ENCOUNTER
Left Voicemail (1st Attempt) and Sent Mychart (1st Attempt) for the patient to call back and schedule the following:    Appointment type: Return diabetes  Provider: Melonie Coughlin  Return date: 3 months (around 3/12/24)  Specialty phone number: [phone]  Additional appointment(s) needed: NA  Additonal Notes: in person visit per checkout

## 2023-12-15 ENCOUNTER — MYC REFILL (OUTPATIENT)
Dept: ENDOCRINOLOGY | Facility: CLINIC | Age: 53
End: 2023-12-15
Payer: COMMERCIAL

## 2023-12-15 DIAGNOSIS — E10.9 WELL CONTROLLED TYPE 1 DIABETES MELLITUS (H): ICD-10-CM

## 2023-12-15 RX ORDER — INSULIN GLARGINE 300 U/ML
INJECTION, SOLUTION SUBCUTANEOUS
Qty: 45 ML | Refills: 1 | Status: SHIPPED | OUTPATIENT
Start: 2023-12-15

## 2023-12-15 RX ORDER — PROCHLORPERAZINE 25 MG/1
SUPPOSITORY RECTAL
Qty: 1 EACH | Refills: 3 | Status: SHIPPED | OUTPATIENT
Start: 2023-12-15

## 2023-12-15 RX ORDER — PROCHLORPERAZINE 25 MG/1
SUPPOSITORY RECTAL
Qty: 1 EACH | Refills: 3 | Status: SHIPPED | OUTPATIENT
Start: 2023-12-15 | End: 2023-12-15

## 2023-12-15 NOTE — TELEPHONE ENCOUNTER
DEXCOM_G6 TRANSMITTER   Last Written Prescription Date:  10/7/2022  Last Fill Quantity: 1,   # refills: 3  Last Office Visit : 12/12/2023  Future Office visit:  7/23/2024  1 each, 3 Refills sent to pharm     Teresa Abdul RN  Central Triage Red Flags/Med Refills    Diabetic Supplies Protocol [id number]/12/2023 06:28 PM   Protocol Details Medication is active on med list    Patient is 18 years of age or older    Recent (6 mo) or future (30 days) visit within the authorizing provider's specialty       To be filled at: [facility] [address] [place] [address]

## 2023-12-19 ENCOUNTER — MYC MEDICAL ADVICE (OUTPATIENT)
Dept: ENDOCRINOLOGY | Facility: CLINIC | Age: 53
End: 2023-12-19
Payer: COMMERCIAL

## 2023-12-19 NOTE — TELEPHONE ENCOUNTER
Spoke with patient who was unable to schedule at the time and will call back when ready to schedule.

## 2023-12-22 DIAGNOSIS — E10.9 TYPE 1 DIABETES MELLITUS WITHOUT COMPLICATION (H): Primary | ICD-10-CM

## 2023-12-22 RX ORDER — LANCETS
EACH MISCELLANEOUS
Qty: 200 EACH | Refills: 3 | Status: SHIPPED | OUTPATIENT
Start: 2023-12-22

## 2024-06-25 NOTE — PROGRESS NOTES
06/25/24 11:00 AM  PATIENT LAB/IMAGING STATUS : No pending lab orders     Aruna Christy CMA  This 52-year-old woman with longstanding type 1 diabetes was seen in follow-up.  She is comanaged with Melonie Coughlin who saw her a few months ago.  At the beginning of the year she needed to move from Tresiba to Toujeo insulin for insurance reasons.  She found she needed much more Toujeo than Tresiba.  She is currently taking 40 units each day.  She has not found it to be as consistent as the Tresiba but overall is okay with the medication.  She continues to take Lyumjev 3 units per 15 g of carb.  She is wearing a Dexcom sensor but has had problems uploading the data to the cloud.  After the visit, our nurse educator helped her with that.  The report shows that she is in target 75% of the time.  She is between 180 and 2 50 22% of the time, and above 250 2% of the time.  She is less than 70 1% of the time.  Calculated A1c is 6.8.  She is wearing the CGM 98% of the time.  Reviewing her glucose data shows that she gets values down to the 70s and then prevents them by eating a small amount of carbohydrate.  She does watch her sensor a lot and will ingest carbs as she sees a downward arrow.  She usually has symptoms with hypoglycemia but often the alarm will go off before she has symptoms.  She is not exercising very much right now.  She is tolerating the CGM but does not want to use any other devices at this time.    She reports she is up-to-date with her eye visits and has no problems with her retina.  She denies paresthesias or foot ulcers.    She does report that she has had right-sided flank pain intermittently for the last 2 to 3 months.  It appears in an oval-shaped area right under her 12th between the midline and her lateral trunk.  It Is a dull pain that is not really all that troubling.  She has not found anything that makes it come on or relieves it.  It does not stop her from doing anything.  It is not associated  with nausea or vomiting.  She denies associated chest pain or shortness of breath.  She does not have diarrhea or dysuria.  She has not had fever or weight loss.  it comes on most days of the week now and last for an hour or more when it occurs.    Current Outpatient Medications   Medication Sig Dispense Refill    blood glucose (NO BRAND SPECIFIED) test strip Use to test blood sugar 2 times daily or as directed. To accompany: Blood Glucose Monitor Brands: per insurance. 200 strip 3    blood glucose (NO BRAND SPECIFIED) test strip Use to test blood sugar 2 times daily or as directed. 200 strip 3    blood glucose monitoring (NO BRAND SPECIFIED) meter device kit Use to test blood sugar 2 times daily or as directed. Preferred blood glucose meter OR supplies to accompany: Blood Glucose Monitor Brands: per insurance. 1 kit 0    blood glucose monitoring (NO BRAND SPECIFIED) meter device kit Use to test blood sugar 4 times daily or as directed. Any covered brand. 1 kit 0    Continuous Blood Gluc  (DEXCOM G6 ) SJUEY Use to read blood sugars as per 's instructions. 1 each 0    Continuous Blood Gluc Sensor (DEXCOM G6 SENSOR) MISC CHANGE EVERY 10 DAYS. 9 each 3    Continuous Blood Gluc Transmit (DEXCOM G6 TRANSMITTER) MISC Change every 3 months. 1 each 3    insulin glargine U-300 (TOUJEO SOLOSTAR) 300 UNIT/ML (1 units dial) pen Use as directed up to 42 units daily. 45 mL 1    insulin pen needle (B-D U/F) 31G X 8 MM miscellaneous USE 1 NEEDLE TO INJECT  SUBCUTANEOUSLY 4 TIMES DAILY AS  DIRECTED 360 each 3    insulin pen needle 31G X 8 MM Use 4 times daily 100 each 11    KETOSTIX test strip Use as directed in case of high glucose, vomiting or illness. 50 strip 3    LYUMJEV KWIKPEN 100 UNIT/ML SOPN INJECT SUBCUTANEOUSLY AS  DIRECTED UP TO 60 UNITS DAILY 60 mL 3    rosuvastatin (CRESTOR) 20 MG tablet Take 1 tablet (20 mg) by mouth daily 90 tablet 3    thin (NO BRAND SPECIFIED) lancets Use with lanceting  device 2x daily. To accompany: Blood Glucose Monitor Brands: per insurance. 200 each 3     No current facility-administered medications for this visit.       /83   Pulse 95   Ht 1.524 m (5')   Wt 89.8 kg (198 lb)   SpO2 98%   BMI 38.67 kg/m     VSS  NAD  Eyes - no periorbital edema, conjunctival injection, scleral icterus  Neck - no thyromegaly  Lungs - clear  CV - RRR.  Normal pulses in feet.  No edema  Abd - NBS, soft nontender without hsm  Back - no percussible flank tenderness  Neuro - sensation intact to monofilament on soles of feet.  DTR 2/4 biceps  Skin - normal texture, no rash at site of dull pain  Feet - no ulcers     Recent Labs   Lab Test 07/23/24  1520 12/02/23  1002 09/02/23  1055 04/21/23  0717 08/25/22  0706 02/21/22  0916 11/11/21  0913 08/13/20  1645 08/13/20  1638 01/21/20  0000 09/16/19  0818 09/16/19  0000   A1C 6.5* 8.2*   < > 6.5* 6.8*   < > 6.6*   < > 7.0*  --   --   --    HEMOGLOBINA1  --   --   --   --   --   --   --   --   --  6.3*  --  7.1*   TSH  --   --   --   --  3.03  --   --   --  2.05  --    < >  --    LDL  --   --   --  48  --   --  72  --  50  --    < >  --    HDL  --   --   --  58  --   --  50  --  72  --    < >  --    TRIG  --   --   --  110  --   --  74  --  61  --    < >  --    CR  --   --   --  0.83  --   --  0.67   < > 0.78  --    < >  --    MICROL  --   --   --  33.7  --   --  14   < >  --   --    < >  --     < > = values in this interval not displayed.   Assessment and plan:    1.  Diabetes control.  All she is doing very well.  Her A1c is in target and she is not having trouble some hypoglycemia.  Will continue with her current regimen.    2.  Diabetes complications.  Will repeat her renal function test today.  It has been normal in the past.  She is up-to-date with her eye visits and has no retinal disease.  Her feet are normal today.    3.hyperlipidemia.  She is on her statin.  Her LDL has been consistently less than 100 so we will repeat it now.    4.flank  pain.  The cause of this flank pain is not entirely clear.  It is too mild to be related to nephrolithiasis.  She does not have fever or dysuria so I doubt it is related to any pyelonephritis.  She has a normal size liver without any right upper quadrant tenderness so I am not sure it is related to her gallbladder or hepatic problem.  The symptoms occur more laterally than at the midline so it may not be related to the pancreas.  Nonetheless, I will check her UA UC, hepatic panel, amylase, lipase, and CBC.  If these are all normal, will watch things for a while.  She is to let me know if the symptoms worsen or she gets new symptoms associated with the pain.  I would have a low threshold for doing an abdominal CT scan.    Follow-up with Melonie Coughlin in about 3 months and me in about 6 months.         Henna Ness MD

## 2024-06-29 ENCOUNTER — HEALTH MAINTENANCE LETTER (OUTPATIENT)
Age: 54
End: 2024-06-29

## 2024-07-08 DIAGNOSIS — E10.9 WELL CONTROLLED TYPE 1 DIABETES MELLITUS (H): ICD-10-CM

## 2024-07-10 RX ORDER — INSULIN LISPRO-AABC 100 [IU]/ML
INJECTION, SOLUTION SUBCUTANEOUS
Qty: 60 ML | Refills: 3 | Status: SHIPPED | OUTPATIENT
Start: 2024-07-10

## 2024-07-20 ENCOUNTER — APPOINTMENT (OUTPATIENT)
Dept: LAB | Facility: CLINIC | Age: 54
End: 2024-07-20
Payer: COMMERCIAL

## 2024-07-23 ENCOUNTER — LAB (OUTPATIENT)
Dept: LAB | Facility: CLINIC | Age: 54
End: 2024-07-23
Payer: COMMERCIAL

## 2024-07-23 ENCOUNTER — OFFICE VISIT (OUTPATIENT)
Dept: ENDOCRINOLOGY | Facility: CLINIC | Age: 54
End: 2024-07-23
Payer: COMMERCIAL

## 2024-07-23 VITALS
BODY MASS INDEX: 38.87 KG/M2 | SYSTOLIC BLOOD PRESSURE: 134 MMHG | HEART RATE: 95 BPM | OXYGEN SATURATION: 98 % | HEIGHT: 60 IN | WEIGHT: 198 LBS | DIASTOLIC BLOOD PRESSURE: 83 MMHG

## 2024-07-23 DIAGNOSIS — R10.9 FLANK PAIN: ICD-10-CM

## 2024-07-23 DIAGNOSIS — E10.9 TYPE 1 DIABETES MELLITUS WITHOUT COMPLICATION (H): ICD-10-CM

## 2024-07-23 DIAGNOSIS — E10.9 TYPE 1 DIABETES MELLITUS WITHOUT COMPLICATION (H): Primary | ICD-10-CM

## 2024-07-23 LAB
ALBUMIN SERPL BCG-MCNC: 4.2 G/DL (ref 3.5–5.2)
ALBUMIN UR-MCNC: NEGATIVE MG/DL
ALP SERPL-CCNC: 100 U/L (ref 40–150)
ALT SERPL W P-5'-P-CCNC: 15 U/L (ref 0–50)
AMYLASE SERPL-CCNC: 51 U/L (ref 28–100)
ANION GAP SERPL CALCULATED.3IONS-SCNC: 9 MMOL/L (ref 7–15)
APPEARANCE UR: CLEAR
AST SERPL W P-5'-P-CCNC: 19 U/L (ref 0–45)
BASOPHILS # BLD AUTO: 0 10E3/UL (ref 0–0.2)
BASOPHILS NFR BLD AUTO: 0 %
BILIRUB DIRECT SERPL-MCNC: <0.2 MG/DL (ref 0–0.3)
BILIRUB SERPL-MCNC: 0.2 MG/DL
BILIRUB UR QL STRIP: NEGATIVE
BUN SERPL-MCNC: 14.1 MG/DL (ref 6–20)
CHLORIDE SERPL-SCNC: 103 MMOL/L (ref 98–107)
COLOR UR AUTO: ABNORMAL
CREAT SERPL-MCNC: 0.84 MG/DL (ref 0.51–0.95)
CREAT UR-MCNC: 76.2 MG/DL
EGFRCR SERPLBLD CKD-EPI 2021: 82 ML/MIN/1.73M2
EOSINOPHIL # BLD AUTO: 0.2 10E3/UL (ref 0–0.7)
EOSINOPHIL NFR BLD AUTO: 3 %
ERYTHROCYTE [DISTWIDTH] IN BLOOD BY AUTOMATED COUNT: 12.9 % (ref 10–15)
GLUCOSE UR STRIP-MCNC: NEGATIVE MG/DL
HBA1C MFR BLD: 6.5 %
HCO3 SERPL-SCNC: 28 MMOL/L (ref 22–29)
HCT VFR BLD AUTO: 41.9 % (ref 35–47)
HGB BLD-MCNC: 13.6 G/DL (ref 11.7–15.7)
HGB UR QL STRIP: NEGATIVE
IMM GRANULOCYTES # BLD: 0 10E3/UL
IMM GRANULOCYTES NFR BLD: 0 %
KETONES UR STRIP-MCNC: NEGATIVE MG/DL
LEUKOCYTE ESTERASE UR QL STRIP: ABNORMAL
LIPASE SERPL-CCNC: 17 U/L (ref 13–60)
LYMPHOCYTES # BLD AUTO: 1.6 10E3/UL (ref 0.8–5.3)
LYMPHOCYTES NFR BLD AUTO: 23 %
MCH RBC QN AUTO: 31.1 PG (ref 26.5–33)
MCHC RBC AUTO-ENTMCNC: 32.5 G/DL (ref 31.5–36.5)
MCV RBC AUTO: 96 FL (ref 78–100)
MICROALBUMIN UR-MCNC: <12 MG/L
MICROALBUMIN/CREAT UR: NORMAL MG/G{CREAT}
MONOCYTES # BLD AUTO: 0.7 10E3/UL (ref 0–1.3)
MONOCYTES NFR BLD AUTO: 10 %
NEUTROPHILS # BLD AUTO: 4.4 10E3/UL (ref 1.6–8.3)
NEUTROPHILS NFR BLD AUTO: 64 %
NITRATE UR QL: NEGATIVE
NRBC # BLD AUTO: 0 10E3/UL
NRBC BLD AUTO-RTO: 0 /100
PH UR STRIP: 7 [PH] (ref 5–7)
PLATELET # BLD AUTO: 199 10E3/UL (ref 150–450)
POTASSIUM SERPL-SCNC: 4.4 MMOL/L (ref 3.4–5.3)
PROT SERPL-MCNC: 7.2 G/DL (ref 6.4–8.3)
RBC # BLD AUTO: 4.38 10E6/UL (ref 3.8–5.2)
RBC URINE: 1 /HPF
SODIUM SERPL-SCNC: 140 MMOL/L (ref 135–145)
SP GR UR STRIP: 1.01 (ref 1–1.03)
SQUAMOUS EPITHELIAL: 3 /HPF
UROBILINOGEN UR STRIP-MCNC: NORMAL MG/DL
WBC # BLD AUTO: 6.8 10E3/UL (ref 4–11)
WBC URINE: 1 /HPF

## 2024-07-23 PROCEDURE — 82043 UR ALBUMIN QUANTITATIVE: CPT | Performed by: INTERNAL MEDICINE

## 2024-07-23 PROCEDURE — 81001 URINALYSIS AUTO W/SCOPE: CPT | Performed by: PATHOLOGY

## 2024-07-23 PROCEDURE — 82565 ASSAY OF CREATININE: CPT | Performed by: PATHOLOGY

## 2024-07-23 PROCEDURE — 83690 ASSAY OF LIPASE: CPT | Performed by: PATHOLOGY

## 2024-07-23 PROCEDURE — 80051 ELECTROLYTE PANEL: CPT | Performed by: PATHOLOGY

## 2024-07-23 PROCEDURE — 82150 ASSAY OF AMYLASE: CPT | Performed by: PATHOLOGY

## 2024-07-23 PROCEDURE — 83036 HEMOGLOBIN GLYCOSYLATED A1C: CPT | Performed by: PATHOLOGY

## 2024-07-23 PROCEDURE — 84520 ASSAY OF UREA NITROGEN: CPT | Performed by: PATHOLOGY

## 2024-07-23 PROCEDURE — 80076 HEPATIC FUNCTION PANEL: CPT | Performed by: PATHOLOGY

## 2024-07-23 PROCEDURE — 36415 COLL VENOUS BLD VENIPUNCTURE: CPT | Performed by: PATHOLOGY

## 2024-07-23 PROCEDURE — 99214 OFFICE O/P EST MOD 30 MIN: CPT | Performed by: INTERNAL MEDICINE

## 2024-07-23 PROCEDURE — 99000 SPECIMEN HANDLING OFFICE-LAB: CPT | Performed by: PATHOLOGY

## 2024-07-23 PROCEDURE — 85025 COMPLETE CBC W/AUTO DIFF WBC: CPT | Performed by: PATHOLOGY

## 2024-07-23 PROCEDURE — G2211 COMPLEX E/M VISIT ADD ON: HCPCS | Performed by: INTERNAL MEDICINE

## 2024-07-23 ASSESSMENT — PAIN SCALES - GENERAL: PAINLEVEL: NO PAIN (0)

## 2024-07-23 NOTE — LETTER
7/23/2024       RE: Coral Painting  [address]  [facility] [address]     Dear Colleague,    Thank you for referring your patient, Coral Painting, to the [facility] ENDOCR[facility]OLOG[facility] [place] [facility]. Please see a copy of my visit note below.    06/25/24 11:00 AM  PATIENT LAB/IMAGING STATUS : No pending lab orders     Aruna Christy CMA  This 52-year-old woman with longstanding type 1 diabetes was seen in follow-up.  She is comanaged with Melonie Coughlin who saw her a few months ago.  At the beginning of the year she needed to move from Tresiba to Toujeo insulin for insurance reasons.  She found she needed much more Toujeo than Tresiba.  She is currently taking 40 units each day.  She has not found it to be as consistent as the Tresiba but overall is okay with the medication.  She continues to take Lyumjev 3 units per 15 g of carb.  She is wearing a Dexcom sensor but has had problems uploading the data to the cloud.  After the visit, our nurse educator helped her with that.  The report shows that she is in target 75% of the time.  She is between 180 and 2 50 22% of the time, and above 250 2% of the time.  She is less than 70 1% of the time.  Calculated A1c is 6.8.  She is wearing the CGM 98% of the time.  Reviewing her glucose data shows that she gets values down to the 70s and then prevents them by eating a small amount of carbohydrate.  She does watch her sensor a lot and will ingest carbs as she sees a downward arrow.  She usually has symptoms with hypoglycemia but often the alarm will go off before she has symptoms.  She is not exercising very much right now.  She is tolerating the CGM but does not want to use any other devices at this time.    She reports she is up-to-date with her eye visits and has no problems with her retina.  She denies paresthesias or foot ulcers.    She does report that she has had right-sided flank pain  intermittently for the last 2 to 3 months.  It appears in an oval-shaped area right under her 12th between the midline and her lateral trunk.  It Is a dull pain that is not really all that troubling.  She has not found anything that makes it come on or relieves it.  It does not stop her from doing anything.  It is not associated with nausea or vomiting.  She denies associated chest pain or shortness of breath.  She does not have diarrhea or dysuria.  She has not had fever or weight loss.  it comes on most days of the week now and last for an hour or more when it occurs.    Current Outpatient Medications   Medication Sig Dispense Refill    blood glucose (NO BRAND SPECIFIED) test strip Use to test blood sugar 2 times daily or as directed. To accompany: Blood Glucose Monitor Brands: per insurance. 200 strip 3    blood glucose (NO BRAND SPECIFIED) test strip Use to test blood sugar 2 times daily or as directed. 200 strip 3    blood glucose monitoring (NO BRAND SPECIFIED) meter device kit Use to test blood sugar 2 times daily or as directed. Preferred blood glucose meter OR supplies to accompany: Blood Glucose Monitor Brands: per insurance. 1 kit 0    blood glucose monitoring (NO BRAND SPECIFIED) meter device kit Use to test blood sugar 4 times daily or as directed. Any covered brand. 1 kit 0    Continuous Blood Gluc  (DEXCOM G6 ) SUJEY Use to read blood sugars as per 's instructions. 1 each 0    Continuous Blood Gluc Sensor (DEXCOM G6 SENSOR) MISC CHANGE EVERY 10 DAYS. 9 each 3    Continuous Blood Gluc Transmit (DEXCOM G6 TRANSMITTER) MISC Change every 3 months. 1 each 3    insulin glargine U-300 (TOUJEO SOLOSTAR) 300 UNIT/ML (1 units dial) pen Use as directed up to 42 units daily. 45 mL 1    insulin pen needle (B-D U/F) 31G X 8 MM miscellaneous USE 1 NEEDLE TO INJECT  SUBCUTANEOUSLY 4 TIMES DAILY AS  DIRECTED 360 each 3    insulin pen needle 31G X 8 MM Use 4 times daily 100 each 11     KETOSTIX test strip Use as directed in case of high glucose, vomiting or illness. 50 strip 3    LYUMJEV KWIKPEN 100 UNIT/ML SOPN INJECT SUBCUTANEOUSLY AS  DIRECTED UP TO 60 UNITS DAILY 60 mL 3    rosuvastatin (CRESTOR) 20 MG tablet Take 1 tablet (20 mg) by mouth daily 90 tablet 3    thin (NO BRAND SPECIFIED) lancets Use with lanceting device 2x daily. To accompany: Blood Glucose Monitor Brands: per insurance. 200 each 3     No current facility-administered medications for this visit.       /83   Pulse 95   Ht 1.524 m (5')   Wt 89.8 kg (198 lb)   SpO2 98%   BMI 38.67 kg/m     VSS  NAD  Eyes - no periorbital edema, conjunctival injection, scleral icterus  Neck - no thyromegaly  Lungs - clear  CV - RRR.  Normal pulses in feet.  No edema  Abd - NBS, soft nontender without hsm  Back - no percussible flank tenderness  Neuro - sensation intact to monofilament on soles of feet.  DTR 2/4 biceps  Skin - normal texture, no rash at site of dull pain  Feet - no ulcers     Recent Labs   Lab Test 07/23/24  1520 12/02/23  1002 09/02/23  1055 04/21/23  0717 08/25/22  0706 02/21/22  0916 11/11/21  0913 08/13/20  1645 08/13/20  1638 01/21/20  0000 09/16/19  0818 09/16/19  0000   A1C 6.5* 8.2*   < > 6.5* 6.8*   < > 6.6*   < > 7.0*  --   --   --    HEMOGLOBINA1  --   --   --   --   --   --   --   --   --  6.3*  --  7.1*   TSH  --   --   --   --  3.03  --   --   --  2.05  --    < >  --    LDL  --   --   --  48  --   --  72  --  50  --    < >  --    HDL  --   --   --  58  --   --  50  --  72  --    < >  --    TRIG  --   --   --  110  --   --  74  --  61  --    < >  --    CR  --   --   --  0.83  --   --  0.67   < > 0.78  --    < >  --    MICROL  --   --   --  33.7  --   --  14   < >  --   --    < >  --     < > = values in this interval not displayed.   Assessment and plan:    1.  Diabetes control.  All she is doing very well.  Her A1c is in target and she is not having trouble some hypoglycemia.  Will continue with her current  regimen.    2.  Diabetes complications.  Will repeat her renal function test today.  It has been normal in the past.  She is up-to-date with her eye visits and has no retinal disease.  Her feet are normal today.    3.hyperlipidemia.  She is on her statin.  Her LDL has been consistently less than 100 so we will repeat it now.    4.flank pain.  The cause of this flank pain is not entirely clear.  It is too mild to be related to nephrolithiasis.  She does not have fever or dysuria so I doubt it is related to any pyelonephritis.  She has a normal size liver without any right upper quadrant tenderness so I am not sure it is related to her gallbladder or hepatic problem.  The symptoms occur more laterally than at the midline so it may not be related to the pancreas.  Nonetheless, I will check her UA UC, hepatic panel, amylase, lipase, and CBC.  If these are all normal, will watch things for a while.  She is to let me know if the symptoms worsen or she gets new symptoms associated with the pain.  I would have a low threshold for doing an abdominal CT scan.    Follow-up with Melonie Coughlin in about 3 months and me in about 6 months.         Henna Ness MD

## 2024-08-05 DIAGNOSIS — E10.9 WELL CONTROLLED TYPE 1 DIABETES MELLITUS (H): ICD-10-CM

## 2024-08-06 RX ORDER — PROCHLORPERAZINE 25 MG/1
SUPPOSITORY RECTAL
Qty: 9 EACH | Refills: 3 | Status: SHIPPED | OUTPATIENT
Start: 2024-08-06

## 2024-08-06 NOTE — TELEPHONE ENCOUNTER
LVD:  7/23/2024  [facility] Henna Ramos MD  Endocrinology, Diabetes, and [facility]     Refilled per protocol.

## 2024-10-16 DIAGNOSIS — E10.9 WELL CONTROLLED TYPE 1 DIABETES MELLITUS (H): ICD-10-CM

## 2024-10-16 RX ORDER — INSULIN GLARGINE 300 U/ML
INJECTION, SOLUTION SUBCUTANEOUS
Qty: 45 ML | Refills: 3 | Status: SHIPPED | OUTPATIENT
Start: 2024-10-16

## 2024-10-16 NOTE — TELEPHONE ENCOUNTER
Toujeo SoloStar 300 UNIT/ML Subcutaneous Solution Pen-injector       Last Written Prescription Date:  12-15-23  Last Fill Quantity: 45 ml,   # refills: 1  Last Office Visit : 7-23-24  Future Office visit:  12-23-24    Routing refill request to provider for review/approval because:  Insulin and insulin pump supplies - refilled per Endocrine clinic.

## 2024-11-19 ENCOUNTER — TRANSFERRED RECORDS (OUTPATIENT)
Dept: HEALTH INFORMATION MANAGEMENT | Facility: CLINIC | Age: 54
End: 2024-11-19
Payer: COMMERCIAL

## 2024-11-19 LAB — RETINOPATHY: POSITIVE

## 2024-12-05 DIAGNOSIS — E10.9 WELL CONTROLLED TYPE 1 DIABETES MELLITUS (H): ICD-10-CM

## 2024-12-06 NOTE — PROGRESS NOTES
Outcome for 12/06/24 1:36 PM: [employer] message sent  Hyacinth SANFORD Chávez  Outcome for 12/19/24 1:34 PM: Left Voicemail   Hyacinth SANFORD Chávez  Outcome for 12/20/24 12:11 PM: Left Voicemail   Hyacinth SANFORD Chávez  Outcome for 12/23/24 10:35 AM:  Pt states she is unable to upload dexcom from home. Pt states she does not have internet. Pt also states she is unable to retrieve the 14 day dexcom summary, pt states her device does not have that. Pt advises provider has her recent lab results.   Hyacinth Chávez MA        Start time: 1104  End time: 1120  Provider location: off site- home  Patient location: off site- home.    HPI:  Ms. Painting is a 53 yo woman here for follow up of type 1 diabetes.  She also sees Dr. Ness. Since her last visit, her glucose has been running higher.  Her insurance required her to switch from tresiba to toujeo and she has required much more (40 units vs 30 previously).  She was having lows, so she recently reduced her toujeo dose.  Unfortunately, she was not able to upload her dexcom sensor today.  She likes the lyumjev.  No severe hypoglycemia.       Felt like she was having some ups and downs.  Going lower at night.  She is currently taking Toujeo 43 units daily.  Hoping tresiba is covered in the new year. She takes L3 units/15g CHO with meals plus correction of 1/25 over 175 mg/dL (average 10-15 units at a time).  She sometimes backs off her her mealtime coverage a bit.   Hasn't had internet in the past month.  Has had to go into the office.  This morning- glucose was around 120     We previously talked about the In-pen, and pumps but she has privacy concerns and prefers not to have any additional things attached to her body.  She continues using the dexcom sensor.       Last time she was in, she discussed having slight, dull pain on the right side of her back- feels it more in the evenings.  Right lower side.  Feels like it is right where her kidney should be. This has been going  on intermittently for at least 7 months. Urine was normal. No fevers, chills, night sweats, weight loss or other new symptoms.     Had eye exam- no changes in retinopathy- mild.      Typical diet:   Breakfast- cereal with banana (cheerios or grape nuts).  Sometimes toast, omelette.   Sometimes eats salads all week, more fruit in the summer than winter.    Tuna sandwich, more pork lately. She doesn't like a lot of vegetables, mostly fruits.   [place] at home.     She has no other concerns today.       RESULTS  Lab Results   Component Value Date    A1C 6.9 (H) 12/14/2024    A1C 6.5 (H) 07/23/2024    A1C 8.2 (H) 12/02/2023    A1C 7.1 (H) 09/02/2023    A1C 6.5 (H) 04/21/2023    A1C 6.8 (H) 08/25/2022    A1C 8.1 (H) 03/22/2021    A1C 7.0 (H) 08/13/2020    A1C 7.7 (H) 11/06/2017    A1C 6.6 (A) 08/05/2013    A1C 7.5 (A) 02/04/2013    HEMOGLOBINA1 6.3 (A) 01/21/2020    HEMOGLOBINA1 7.1 (A) 09/16/2019    HEMOGLOBINA1 6.6 (A) 06/03/2019    HEMOGLOBINA1 7.2 (A) 08/20/2018    HEMOGLOBINA1 6.9 (A) 05/07/2018       TSH   Date Value Ref Range Status   12/14/2024 2.29 0.30 - 4.20 uIU/mL Final   08/25/2022 3.03 0.40 - 4.00 mU/L Final   08/13/2020 2.05 0.40 - 4.00 mU/L Final   09/16/2019 2.29 0.40 - 4.00 mU/L Final   08/14/2017 2.17 0.40 - 4.00 mU/L Final   10/24/2016 2.80 0.40 - 4.00 mU/L Final   08/13/2007 1.86 0.4 - 5.0 mU/L Final     T4 Free   Date Value Ref Range Status   08/13/2007 1.02 0.70 - 1.85 ng/dL Final       ALT   Date Value Ref Range Status   07/23/2024 15 0 - 50 U/L Final   09/16/2019 15 0 - 50 U/L Final   06/04/2012 12 0 - 50 U/L Final   ]    Recent Labs   Lab Test 12/14/24  1013 04/21/23  0717   CHOL 122 128   HDL 56 58   LDL 49 48   TRIG 87 110       Lab Results   Component Value Date     07/23/2024     03/22/2021      Lab Results   Component Value Date    POTASSIUM 4.4 07/23/2024    POTASSIUM 4.6 11/11/2021    POTASSIUM 4.1 03/22/2021     Lab Results   Component Value Date    CHLORIDE 103 07/23/2024     CHLORIDE 106 11/11/2021    CHLORIDE 106 03/22/2021     Lab Results   Component Value Date    MICHELLE 8.5 11/11/2021    MICHELLE 8.7 03/22/2021     Lab Results   Component Value Date    CO2 28 07/23/2024    CO2 22 11/11/2021    CO2 24 03/22/2021     Lab Results   Component Value Date    BUN 14.1 07/23/2024    BUN 10 11/11/2021    BUN 12 03/22/2021     Lab Results   Component Value Date    CR 0.84 07/23/2024    CR 0.72 03/22/2021       GFR Estimate   Date Value Ref Range Status   07/23/2024 82 >60 mL/min/1.73m2 Final     Comment:     eGFR calculated using 2021 CKD-EPI equation.   04/21/2023 84 >60 mL/min/1.73m2 Final     Comment:     eGFR calculated using 2021 CKD-EPI equation.   11/11/2021 >90 >60 mL/min/1.73m2 Final     Comment:     As of July 11, 2021, eGFR is calculated by the CKD-EPI creatinine equation, without race adjustment. eGFR can be influenced by muscle mass, exercise, and diet. The reported eGFR is an estimation only and is only applicable if the renal function is stable.   03/22/2021 >90 >60 mL/min/[1.73_m2] Final     Comment:     Non  GFR Calc  Starting 12/18/2018, serum creatinine based estimated GFR (eGFR) will be   calculated using the Chronic Kidney Disease Epidemiology Collaboration   (CKD-EPI) equation.     08/13/2020 88 >60 mL/min/[1.73_m2] Final     Comment:     Non  GFR Calc  Starting 12/18/2018, serum creatinine based estimated GFR (eGFR) will be   calculated using the Chronic Kidney Disease Epidemiology Collaboration   (CKD-EPI) equation.     09/16/2019 >90 >60 mL/min/[1.73_m2] Final     Comment:     Non  GFR Calc  Starting 12/18/2018, serum creatinine based estimated GFR (eGFR) will be   calculated using the Chronic Kidney Disease Epidemiology Collaboration   (CKD-EPI) equation.       GFR Estimate If Black   Date Value Ref Range Status   03/22/2021 >90 >60 mL/min/[1.73_m2] Final     Comment:      GFR Calc  Starting 12/18/2018, serum  "creatinine based estimated GFR (eGFR) will be   calculated using the Chronic Kidney Disease Epidemiology Collaboration   (CKD-EPI) equation.     08/13/2020 >90 >60 mL/min/[1.73_m2] Final     Comment:      GFR Calc  Starting 12/18/2018, serum creatinine based estimated GFR (eGFR) will be   calculated using the Chronic Kidney Disease Epidemiology Collaboration   (CKD-EPI) equation.     09/16/2019 >90 >60 mL/min/[1.73_m2] Final     Comment:      GFR Calc  Starting 12/18/2018, serum creatinine based estimated GFR (eGFR) will be   calculated using the Chronic Kidney Disease Epidemiology Collaboration   (CKD-EPI) equation.         Lab Results   Component Value Date    MICROL <12.0 07/23/2024    MICROL 14 11/11/2021    MICROL 11 08/13/2020     No results found for: \"MICROALBUMIN\"  No results found for: \"CPEPT\", \"GADAB\", \"ISCAB\"    Vitamin B12   Date Value Ref Range Status   11/11/2021 244 193 - 986 pg/mL Final   ]    Tissue Transglutaminase Antibody IgA   Date Value Ref Range Status   12/14/2024 0.8 <7.0 U/mL Final     Comment:     Negative- The tTG-IgA assay has limited utility for patients with decreased levels of IgA. Screening for celiac disease should include IgA testing to rule out selective IgA deficiency and to guide selection and interpretation of serological testing. tTG-IgG testing may be positive in celiac disease patients with IgA deficiency.     Tissue Transglutaminase Antibody IgG   Date Value Ref Range Status   12/14/2024 <0.6 <7.0 U/mL Final     Comment:     Negative         Most recent eye exam date: : Not Found       Assessment   Assessment/Plan:      1.  Type 1 DM-   Coral's glucose is well controlled.  A1c is 6.9% and she is no longer having hypoglycemia since reducing toujeo.  She really preferred tresiba and is hoping it could be covered in the new year.  Will meet with [facility] pharmacy to discuss this, and medication cost, which seems to be going up.  No changes in dosing " today.  Discussed potential upgrade to dexcom G7, but she is comfortable with her current system.  Suggested in person visits in the future, as we have difficulty with accessing her data.  We have previously talked about dm technologies that might be helpful including In-pen and Omnipod 5 pump, but she is concerned about privacy and also does not like to be connected to anything more than the dexcom.  We made the following plan today (instructions given to patient):      No changes to your insulin for now.      Schedule CT scan for your flank pain.      Please let me know if you are having low blood sugars less than 70 or over 250 mg/dL.      If you have concerns, please send me a [place] message M-Th, call the clinic at [phone], or call [phone] after hours/weekends and ask to speak with the endocrinologist on call.      2.  Risk factors- BP under good control.  Creatinine normal.  No MA.  Lipids well controlled on statin. Had recent eye exam with mild, non-proliferative retinopathy. .      3.  Flank pain- this continues from last visit and is noticeable.  Prior UA was normal.  Will order CT abdomen pelvis.    4. F/U in 3 months with Dr. Ness, 6 mos with me.  Call sooner with concerns.      25 minutes spent on the date of the encounter doing chart review, review of test results, review of continuous glucose sensor, interpretation of glucose data, patient visit and documentation, counseling/coordination of care, and discussion of follow up plan for worsening hyper and hypoglycemia.  The patient understood and is satisfied with today's visit.       Melonie Coughlin PA-C, MPAS   [facility]  Department of Medicine  Division of Endocrinology and Diabetes

## 2024-12-11 ENCOUNTER — MYC MEDICAL ADVICE (OUTPATIENT)
Dept: ENDOCRINOLOGY | Facility: CLINIC | Age: 54
End: 2024-12-11
Payer: COMMERCIAL

## 2024-12-11 DIAGNOSIS — E10.9 WELL CONTROLLED TYPE 1 DIABETES MELLITUS (H): Primary | ICD-10-CM

## 2024-12-11 RX ORDER — PEN NEEDLE, DIABETIC 31 GX5/16"
NEEDLE, DISPOSABLE MISCELLANEOUS
Qty: 400 EACH | Refills: 3 | Status: SHIPPED | OUTPATIENT
Start: 2024-12-11

## 2024-12-11 NOTE — TELEPHONE ENCOUNTER
RESULTS  Lab Results   Component Value Date    A1C 6.5 (H) 07/23/2024    A1C 8.2 (H) 12/02/2023    A1C 7.1 (H) 09/02/2023    A1C 6.5 (H) 04/21/2023    A1C 6.8 (H) 08/25/2022    A1C 7.3 (H) 05/24/2022    A1C 8.1 (H) 03/22/2021    A1C 7.0 (H) 08/13/2020    A1C 7.7 (H) 11/06/2017    A1C 6.6 (A) 08/05/2013    A1C 7.5 (A) 02/04/2013    HEMOGLOBINA1 6.3 (A) 01/21/2020    HEMOGLOBINA1 7.1 (A) 09/16/2019    HEMOGLOBINA1 6.6 (A) 06/03/2019    HEMOGLOBINA1 7.2 (A) 08/20/2018    HEMOGLOBINA1 6.9 (A) 05/07/2018       TSH   Date Value Ref Range Status   08/25/2022 3.03 0.40 - 4.00 mU/L Final   08/13/2020 2.05 0.40 - 4.00 mU/L Final   09/16/2019 2.29 0.40 - 4.00 mU/L Final   08/14/2017 2.17 0.40 - 4.00 mU/L Final   10/24/2016 2.80 0.40 - 4.00 mU/L Final   08/13/2007 1.86 0.4 - 5.0 mU/L Final     T4 Free   Date Value Ref Range Status   08/13/2007 1.02 0.70 - 1.85 ng/dL Final       ALT   Date Value Ref Range Status   07/23/2024 15 0 - 50 U/L Final   09/16/2019 15 0 - 50 U/L Final   06/04/2012 12 0 - 50 U/L Final   ]    Recent Labs   Lab Test 04/21/23  0717 11/11/21  0913   CHOL 128 137   HDL 58 50   LDL 48 72   TRIG 110 74       Lab Results   Component Value Date     07/23/2024     03/22/2021      Lab Results   Component Value Date    POTASSIUM 4.4 07/23/2024    POTASSIUM 4.6 11/11/2021    POTASSIUM 4.1 03/22/2021     Lab Results   Component Value Date    CHLORIDE 103 07/23/2024    CHLORIDE 106 11/11/2021    CHLORIDE 106 03/22/2021     Lab Results   Component Value Date    MICHELLE 8.5 11/11/2021    MICHELLE 8.7 03/22/2021     Lab Results   Component Value Date    CO2 28 07/23/2024    CO2 22 11/11/2021    CO2 24 03/22/2021     Lab Results   Component Value Date    BUN 14.1 07/23/2024    BUN 10 11/11/2021    BUN 12 03/22/2021     Lab Results   Component Value Date    CR 0.84 07/23/2024    CR 0.72 03/22/2021       GFR Estimate   Date Value Ref Range Status   07/23/2024 82 >60 mL/min/1.73m2 Final     Comment:     eGFR calculated  using 2021 CKD-EPI equation.   04/21/2023 84 >60 mL/min/1.73m2 Final     Comment:     eGFR calculated using 2021 CKD-EPI equation.   11/11/2021 >90 >60 mL/min/1.73m2 Final     Comment:     As of July 11, 2021, eGFR is calculated by the CKD-EPI creatinine equation, without race adjustment. eGFR can be influenced by muscle mass, exercise, and diet. The reported eGFR is an estimation only and is only applicable if the renal function is stable.   03/22/2021 >90 >60 mL/min/[1.73_m2] Final     Comment:     Non  GFR Calc  Starting 12/18/2018, serum creatinine based estimated GFR (eGFR) will be   calculated using the Chronic Kidney Disease Epidemiology Collaboration   (CKD-EPI) equation.     08/13/2020 88 >60 mL/min/[1.73_m2] Final     Comment:     Non  GFR Calc  Starting 12/18/2018, serum creatinine based estimated GFR (eGFR) will be   calculated using the Chronic Kidney Disease Epidemiology Collaboration   (CKD-EPI) equation.     09/16/2019 >90 >60 mL/min/[1.73_m2] Final     Comment:     Non  GFR Calc  Starting 12/18/2018, serum creatinine based estimated GFR (eGFR) will be   calculated using the Chronic Kidney Disease Epidemiology Collaboration   (CKD-EPI) equation.       GFR Estimate If Black   Date Value Ref Range Status   03/22/2021 >90 >60 mL/min/[1.73_m2] Final     Comment:      GFR Calc  Starting 12/18/2018, serum creatinine based estimated GFR (eGFR) will be   calculated using the Chronic Kidney Disease Epidemiology Collaboration   (CKD-EPI) equation.     08/13/2020 >90 >60 mL/min/[1.73_m2] Final     Comment:      GFR Calc  Starting 12/18/2018, serum creatinine based estimated GFR (eGFR) will be   calculated using the Chronic Kidney Disease Epidemiology Collaboration   (CKD-EPI) equation.     09/16/2019 >90 >60 mL/min/[1.73_m2] Final     Comment:      GFR Calc  Starting 12/18/2018, serum creatinine based estimated GFR  "(eGFR) will be   calculated using the Chronic Kidney Disease Epidemiology Collaboration   (CKD-EPI) equation.         Lab Results   Component Value Date    MICROL <12.0 07/23/2024    MICROL 14 11/11/2021    MICROL 11 08/13/2020     No results found for: \"MICROALBUMIN\"  No results found for: \"CPEPT\", \"GADAB\", \"ISCAB\"    Vitamin B12   Date Value Ref Range Status   11/11/2021 244 193 - 986 pg/mL Final   ]    No results found for: \"TTG\", \"TTGG\"      Most recent eye exam date: : Not Found     FIB-4 Calculation: 1.33 at 7/23/2024  4:29 PM  Calculated from:  SGOT/AST: 19 U/L at 7/23/2024  4:29 PM  SGPT/ALT: 15 U/L at 7/23/2024  4:29 PM  Platelets: 199 10e3/uL at 7/23/2024  4:29 PM  Age: 53 years  A value of FIB-4 below 1.30 is considered as low risk for advanced fibrosis; a value of FIB-4 over 2.67 is considered as high risk for advanced fibrosis; and FIB-4 values between 1.30 and 2.67 are considered as intermediate risk of advanced fibrosis.    "

## 2024-12-11 NOTE — TELEPHONE ENCOUNTER
insulin pen needle (B-D U/F) 31G X 8 MM miscellaneous 360 each 3 9/14/2023     Last Office Visit: 7/23/24  Future Office visit:   12/23/24      Joan Dixon RN  [facility] Triage & Med Refill Team

## 2024-12-14 ENCOUNTER — APPOINTMENT (OUTPATIENT)
Dept: LAB | Facility: CLINIC | Age: 54
End: 2024-12-14
Payer: COMMERCIAL

## 2024-12-14 LAB
CHOLEST SERPL-MCNC: 122 MG/DL
EST. AVERAGE GLUCOSE BLD GHB EST-MCNC: 151 MG/DL
FASTING STATUS PATIENT QL REPORTED: NO
HBA1C MFR BLD: 6.9 % (ref 0–5.6)
HDLC SERPL-MCNC: 56 MG/DL
LDLC SERPL CALC-MCNC: 49 MG/DL
NONHDLC SERPL-MCNC: 66 MG/DL
TRIGL SERPL-MCNC: 87 MG/DL
TSH SERPL DL<=0.005 MIU/L-ACNC: 2.29 UIU/ML (ref 0.3–4.2)

## 2024-12-14 PROCEDURE — 80061 LIPID PANEL: CPT | Performed by: PHYSICIAN ASSISTANT

## 2024-12-14 PROCEDURE — 84443 ASSAY THYROID STIM HORMONE: CPT | Performed by: PHYSICIAN ASSISTANT

## 2024-12-14 PROCEDURE — 99000 SPECIMEN HANDLING OFFICE-LAB: CPT | Performed by: PATHOLOGY

## 2024-12-14 PROCEDURE — 86364 TISS TRNSGLTMNASE EA IG CLAS: CPT | Performed by: PHYSICIAN ASSISTANT

## 2024-12-17 LAB
TTG IGA SER-ACNC: 0.8 U/ML
TTG IGG SER-ACNC: <0.6 U/ML

## 2024-12-19 ENCOUNTER — TELEPHONE (OUTPATIENT)
Dept: ENDOCRINOLOGY | Facility: CLINIC | Age: 54
End: 2024-12-19
Payer: COMMERCIAL

## 2024-12-19 NOTE — TELEPHONE ENCOUNTER
Called patient and left voicemail. Patient has an appointment on  12/23/24 . Need patient to upload their Dexcom device to site for provider to review prior to their appointment.  Hyacinth Chávez MA

## 2024-12-23 ENCOUNTER — VIRTUAL VISIT (OUTPATIENT)
Dept: ENDOCRINOLOGY | Facility: CLINIC | Age: 54
End: 2024-12-23
Payer: COMMERCIAL

## 2024-12-23 DIAGNOSIS — R10.9 FLANK PAIN: Primary | ICD-10-CM

## 2024-12-23 DIAGNOSIS — E10.9 WELL CONTROLLED TYPE 1 DIABETES MELLITUS (H): ICD-10-CM

## 2024-12-23 NOTE — LETTER
12/23/2024       RE: Coral Painting  [address]  [facility] [address]     Dear Colleague,    Thank you for referring your patient, Coral Painting, to the [facility] [place] [facility]. Please see a copy of my visit note below.    Outcome for 12/06/24 1:36 PM: [employer] message sent  Hyacinth Chávez MA  Outcome for 12/19/24 1:34 PM: Left Voicemail   Hyacinth Chávez MA  Outcome for 12/20/24 12:11 PM: Left Voicemail   Hyacinth Chávez MA  Outcome for 12/23/24 10:35 AM:  Pt states she is unable to upload dexcom from home. Pt states she does not have internet. Pt also states she is unable to retrieve the 14 day dexcom summary, pt states her device does not have that. Pt advises provider has her recent lab results.   Hyacinth Chávez MA        Start time: 1104  End time: 1120  Provider location: off site- home  Patient location: off site- home.    HPI:  Ms. Painting is a 53 yo woman here for follow up of type 1 diabetes.  She also sees Dr. Ness. Since her last visit, her glucose has been running higher.  Her insurance required her to switch from tresiba to toujeo and she has required much more (40 units vs 30 previously).  She was having lows, so she recently reduced her toujeo dose.  Unfortunately, she was not able to upload her dexcom sensor today.  She likes the lyumjev.  No severe hypoglycemia.       Felt like she was having some ups and downs.  Going lower at night.  She is currently taking Toujeo 43 units daily.  Hoping tresiba is covered in the new year. She takes L3 units/15g CHO with meals plus correction of 1/25 over 175 mg/dL (average 10-15 units at a time).  She sometimes backs off her her mealtime coverage a bit.   Hasn't had internet in the past month.  Has had to go into the office.  This morning- glucose was around 120     We previously talked about the In-pen, and pumps but she has privacy  concerns and prefers not to have any additional things attached to her body.  She continues using the dexcom sensor.       Last time she was in, she discussed having slight, dull pain on the right side of her back- feels it more in the evenings.  Right lower side.  Feels like it is right where her kidney should be. This has been going on intermittently for at least 7 months. Urine was normal. No fevers, chills, night sweats, weight loss or other new symptoms.     Had eye exam- no changes in retinopathy- mild.      Typical diet:   Breakfast- cereal with banana (cheerios or grape nuts).  Sometimes toast, omelette.   Sometimes eats salads all week, more fruit in the summer than winter.    Tuna sandwich, more pork lately. She doesn't like a lot of vegetables, mostly fruits.   [place] at home.     She has no other concerns today.       RESULTS  Lab Results   Component Value Date    A1C 6.9 (H) 12/14/2024    A1C 6.5 (H) 07/23/2024    A1C 8.2 (H) 12/02/2023    A1C 7.1 (H) 09/02/2023    A1C 6.5 (H) 04/21/2023    A1C 6.8 (H) 08/25/2022    A1C 8.1 (H) 03/22/2021    A1C 7.0 (H) 08/13/2020    A1C 7.7 (H) 11/06/2017    A1C 6.6 (A) 08/05/2013    A1C 7.5 (A) 02/04/2013    HEMOGLOBINA1 6.3 (A) 01/21/2020    HEMOGLOBINA1 7.1 (A) 09/16/2019    HEMOGLOBINA1 6.6 (A) 06/03/2019    HEMOGLOBINA1 7.2 (A) 08/20/2018    HEMOGLOBINA1 6.9 (A) 05/07/2018       TSH   Date Value Ref Range Status   12/14/2024 2.29 0.30 - 4.20 uIU/mL Final   08/25/2022 3.03 0.40 - 4.00 mU/L Final   08/13/2020 2.05 0.40 - 4.00 mU/L Final   09/16/2019 2.29 0.40 - 4.00 mU/L Final   08/14/2017 2.17 0.40 - 4.00 mU/L Final   10/24/2016 2.80 0.40 - 4.00 mU/L Final   08/13/2007 1.86 0.4 - 5.0 mU/L Final     T4 Free   Date Value Ref Range Status   08/13/2007 1.02 0.70 - 1.85 ng/dL Final       ALT   Date Value Ref Range Status   07/23/2024 15 0 - 50 U/L Final   09/16/2019 15 0 - 50 U/L Final   06/04/2012 12 0 - 50 U/L Final   ]    Recent Labs   Lab Test 12/14/24  1013  04/21/23  0717   CHOL 122 128   HDL 56 58   LDL 49 48   TRIG 87 110       Lab Results   Component Value Date     07/23/2024     03/22/2021      Lab Results   Component Value Date    POTASSIUM 4.4 07/23/2024    POTASSIUM 4.6 11/11/2021    POTASSIUM 4.1 03/22/2021     Lab Results   Component Value Date    CHLORIDE 103 07/23/2024    CHLORIDE 106 11/11/2021    CHLORIDE 106 03/22/2021     Lab Results   Component Value Date    MICHELLE 8.5 11/11/2021    MICHELLE 8.7 03/22/2021     Lab Results   Component Value Date    CO2 28 07/23/2024    CO2 22 11/11/2021    CO2 24 03/22/2021     Lab Results   Component Value Date    BUN 14.1 07/23/2024    BUN 10 11/11/2021    BUN 12 03/22/2021     Lab Results   Component Value Date    CR 0.84 07/23/2024    CR 0.72 03/22/2021       GFR Estimate   Date Value Ref Range Status   07/23/2024 82 >60 mL/min/1.73m2 Final     Comment:     eGFR calculated using 2021 CKD-EPI equation.   04/21/2023 84 >60 mL/min/1.73m2 Final     Comment:     eGFR calculated using 2021 CKD-EPI equation.   11/11/2021 >90 >60 mL/min/1.73m2 Final     Comment:     As of July 11, 2021, eGFR is calculated by the CKD-EPI creatinine equation, without race adjustment. eGFR can be influenced by muscle mass, exercise, and diet. The reported eGFR is an estimation only and is only applicable if the renal function is stable.   03/22/2021 >90 >60 mL/min/[1.73_m2] Final     Comment:     Non  GFR Calc  Starting 12/18/2018, serum creatinine based estimated GFR (eGFR) will be   calculated using the Chronic Kidney Disease Epidemiology Collaboration   (CKD-EPI) equation.     08/13/2020 88 >60 mL/min/[1.73_m2] Final     Comment:     Non  GFR Calc  Starting 12/18/2018, serum creatinine based estimated GFR (eGFR) will be   calculated using the Chronic Kidney Disease Epidemiology Collaboration   (CKD-EPI) equation.     09/16/2019 >90 >60 mL/min/[1.73_m2] Final     Comment:     Non  GFR  "Calc  Starting 12/18/2018, serum creatinine based estimated GFR (eGFR) will be   calculated using the Chronic Kidney Disease Epidemiology Collaboration   (CKD-EPI) equation.       GFR Estimate If Black   Date Value Ref Range Status   03/22/2021 >90 >60 mL/min/[1.73_m2] Final     Comment:      GFR Calc  Starting 12/18/2018, serum creatinine based estimated GFR (eGFR) will be   calculated using the Chronic Kidney Disease Epidemiology Collaboration   (CKD-EPI) equation.     08/13/2020 >90 >60 mL/min/[1.73_m2] Final     Comment:      GFR Calc  Starting 12/18/2018, serum creatinine based estimated GFR (eGFR) will be   calculated using the Chronic Kidney Disease Epidemiology Collaboration   (CKD-EPI) equation.     09/16/2019 >90 >60 mL/min/[1.73_m2] Final     Comment:      GFR Calc  Starting 12/18/2018, serum creatinine based estimated GFR (eGFR) will be   calculated using the Chronic Kidney Disease Epidemiology Collaboration   (CKD-EPI) equation.         Lab Results   Component Value Date    MICROL <12.0 07/23/2024    MICROL 14 11/11/2021    MICROL 11 08/13/2020     No results found for: \"MICROALBUMIN\"  No results found for: \"CPEPT\", \"GADAB\", \"ISCAB\"    Vitamin B12   Date Value Ref Range Status   11/11/2021 244 193 - 986 pg/mL Final   ]    Tissue Transglutaminase Antibody IgA   Date Value Ref Range Status   12/14/2024 0.8 <7.0 U/mL Final     Comment:     Negative- The tTG-IgA assay has limited utility for patients with decreased levels of IgA. Screening for celiac disease should include IgA testing to rule out selective IgA deficiency and to guide selection and interpretation of serological testing. tTG-IgG testing may be positive in celiac disease patients with IgA deficiency.     Tissue Transglutaminase Antibody IgG   Date Value Ref Range Status   12/14/2024 <0.6 <7.0 U/mL Final     Comment:     Negative         Most recent eye exam date: : Not Found       Assessment "   Assessment/Plan:      1.  Type 1 DM-   Coral's glucose is well controlled.  A1c is 6.9% and she is no longer having hypoglycemia since reducing toujeo.  She really preferred tresiba and is hoping it could be covered in the new year.  Will meet with [facility] pharmacy to discuss this, and medication cost, which seems to be going up.  No changes in dosing today.  Discussed potential upgrade to dexcom G7, but she is comfortable with her current system.  Suggested in person visits in the future, as we have difficulty with accessing her data.  We have previously talked about dm technologies that might be helpful including In-pen and Omnipod 5 pump, but she is concerned about privacy and also does not like to be connected to anything more than the dexcom.  We made the following plan today (instructions given to patient):      No changes to your insulin for now.      Schedule CT scan for your flank pain.      Please let me know if you are having low blood sugars less than 70 or over 250 mg/dL.      If you have concerns, please send me a [employer] message M-Th, call the clinic at [phone], or call [phone] after hours/weekends and ask to speak with the endocrinologist on call.      2.  Risk factors- BP under good control.  Creatinine normal.  No MA.  Lipids well controlled on statin. Had recent eye exam with mild, non-proliferative retinopathy. .      3.  Flank pain- this continues from last visit and is noticeable.  Prior UA was normal.  Will order CT abdomen pelvis.    4. F/U in 3 months with Dr. Ness, 6 mos with me.  Call sooner with concerns.      25 minutes spent on the date of the encounter doing chart review, review of test results, review of continuous glucose sensor, interpretation of glucose data, patient visit and documentation, counseling/coordination of care, and discussion of follow up plan for worsening hyper and hypoglycemia.  The patient understood and is satisfied with today's visit.       Melonie  MARLON Coughlin, MPAS   [facility]  Department of Medicine  Division of Endocrinology and Diabetes                                              Again, thank you for allowing me to participate in the care of your patient.      Sincerely,    Melonie Coughlin PA-C

## 2024-12-23 NOTE — PATIENT INSTRUCTIONS
No changes to your insulin for now.      Schedule CT scan for your flank pain.      Please let me know if you are having low blood sugars less than 70 or over 250 mg/dL.      If you have concerns, please send me a [employer] message M-Th, call the clinic at [phone], or call [phone] after hours/weekends and ask to speak with the endocrinologist on call.

## 2024-12-23 NOTE — NURSING NOTE
Current patient location:  Mom's house.     Is the patient currently in the state of [place]? YES    Visit mode:VIDEO    If the visit is dropped, the patient can be reconnected by:VIDEO VISIT: Text to cell phone:   Telephone Information:   Mobile [phone]       Will anyone else be joining the visit? NO  (If patient encounters technical issues they should call [phone]    Are changes needed to the allergy or medication list? No    Are refills needed on medications prescribed by this physician? NO    Rooming Documentation:  Not applicable    Reason for visit: RECHECK    Hyacinth Chávez CMA VVF

## 2024-12-26 ENCOUNTER — TELEPHONE (OUTPATIENT)
Dept: ENDOCRINOLOGY | Facility: CLINIC | Age: 54
End: 2024-12-26
Payer: COMMERCIAL

## 2024-12-26 NOTE — TELEPHONE ENCOUNTER
MTM referral from: [facility] visit (referral by provider)    MTM referral outreach attempt #2 on December 26, 2024 at 11:56 AM      Outcome: Patient not reachable after several attempts, routed to Pharmacist Team/Provider as an [employer] Message Sent    [facility]

## 2025-01-06 ENCOUNTER — TELEPHONE (OUTPATIENT)
Dept: PHARMACY | Facility: CLINIC | Age: 55
End: 2025-01-06
Payer: COMMERCIAL

## 2025-01-06 NOTE — TELEPHONE ENCOUNTER
Referral outreach attempt #3 completed on 1/6/25 via telephone call and MyC message.     Outcome: I left a detailed message sharing information on how we can best support pt with insulin costs, as well as sent a MyC with scheduling tool attached.

## 2025-01-06 NOTE — TELEPHONE ENCOUNTER
Will make 1 more attempt in a week if not scheduled.    Elaine Walker, PharmD  Diabetes & Endocrine MTM Pharmacist

## 2025-01-07 ENCOUNTER — TELEPHONE (OUTPATIENT)
Dept: ENDOCRINOLOGY | Facility: CLINIC | Age: 55
End: 2025-01-07
Payer: COMMERCIAL

## 2025-01-07 NOTE — TELEPHONE ENCOUNTER
Left Voicemail (1st Attempt) and Sent Mychart (1st Attempt) for the patient to call back and schedule the following:    Appointment type: return diabetes  Provider: Ced  Return date: June 2025  Specialty phone number: [phone]  Additional appointment(s) needed:   Additonal Notes:   LVM, MyC x1     Disposition: Return in about 6 months (around 6/23/2025).      Ariadne Knox on 1/7/2025 at 2:20 PM

## 2025-01-09 NOTE — TELEPHONE ENCOUNTER
Left Voicemail (2nd Attempt) for the patient to call back and schedule the following:    Appointment type: return diabetes  Provider: Ced  Return date: June 2025  Specialty phone number: [phone]  Additional appointment(s) needed: CT Scan and UA at the pt's convenience   Additonal Notes: LVM x2, MyC x1  Disposition from visit on 12/23:   Return in about 6 months (around 6/23/2025).      Henna Saini on 1/9/2025 at 11:05 AM

## 2025-01-14 DIAGNOSIS — E10.9 WELL CONTROLLED TYPE 1 DIABETES MELLITUS (H): ICD-10-CM

## 2025-01-14 RX ORDER — ROSUVASTATIN CALCIUM 20 MG/1
20 TABLET, COATED ORAL DAILY
Qty: 90 TABLET | Refills: 3 | Status: SHIPPED | OUTPATIENT
Start: 2025-01-14

## 2025-01-14 NOTE — TELEPHONE ENCOUNTER
LVD:  12/23/2024  [facility] Marcos Coughlin  LDL Cholesterol Calculated   Date Value Ref Range Status   12/14/2024 49 <100 mg/dL Final   08/13/2020 50 <100 mg/dL Final     Comment:     Desirable:       <100 mg/dl     Rosuvastatin 20 MG  Refilled per protocol.

## 2025-01-20 NOTE — PROGRESS NOTES
Outcome for 01/20/25 4:24 PM: [employer] message sent  Hyacinth Bucioadriano MA  Outcome for 01/30/25 12:19 PM: Left Voicemail   Bobbilynn Grossaint, CAMILLA  Outcome for 01/31/25 7:05 AM: Data obtained via Dexcom website  Hyacinth SANFORD Chávez    This 54 -year-old woman with longstanding type 1 diabetes was seen in follow-up.  She is comanaged with Melonie Coughlin who saw her a few months ago.  She is currently taking 40 units of Toujeo each day.  She continues to take Lyumjev 3 units per 15 g of carb.  She is wearing a Dexcom sensor and data are below.  Overall she is pretty happy with her blood sugars.  She does watch her sensor a lot and will anticipate a low and treated it when she is in the 80s.  She has no problems recognizing her low blood sugars.  She does not think she has them too frequently.  She exercises by walking her dog.    Saw her ophthalmologist in November 2024 and had no retinopathy.  She denies having paresthesias or foot ulcers.  She continues to take her Crestor.    She continues to have right sided flank pain.  She told me about this when I saw her in July.  Laboratory test done at that time were unremarkable.  The symptoms have not worsened but they continue to be present.  The present most days of the week for uncertain periods of time.  She has not had any nausea vomiting diarrhea constipation or bloody stools.  She has not lost any weight.  She has no chest pain or shortness of breath.  It just feels like dull pain in her kidney on the right.                Current Outpatient Medications   Medication Sig Dispense Refill    blood glucose (NO BRAND SPECIFIED) test strip Use to test blood sugar 2 times daily or as directed. To accompany: Blood Glucose Monitor Brands: per insurance. 200 strip 3    blood glucose (NO BRAND SPECIFIED) test strip Use to test blood sugar 2 times daily or as directed. 200 strip 3    blood glucose monitoring (NO BRAND SPECIFIED) meter device kit Use to test blood sugar 2 times  daily or as directed. Preferred blood glucose meter OR supplies to accompany: Blood Glucose Monitor Brands: per insurance. 1 kit 0    blood glucose monitoring (NO BRAND SPECIFIED) meter device kit Use to test blood sugar 4 times daily or as directed. Any covered brand. 1 kit 0    Continuous Blood Gluc  (DEXCOM G6 ) SUJEY Use to read blood sugars as per 's instructions. 1 each 0    Continuous Blood Gluc Transmit (DEXCOM G6 TRANSMITTER) MISC Change every 3 months. 1 each 3    Continuous Glucose Sensor (DEXCOM G6 SENSOR) MISC CHANGE EVERY 10 DAYS 9 each 3    insulin glargine U-300 (TOUJEO SOLOSTAR) 300 UNIT/ML (1 units dial) pen INJECT SUBCUTANEOUSLY AS  DIRECTED UP TO 42 UNITS DAILY 45 mL 3    insulin pen needle (B-D U/F) 31G X 8 MM miscellaneous USE 1 NEEDLE TO INJECT  SUBCUTANEOUSLY 4 TIMES DAILY AS  DIRECTED 400 each 3    insulin pen needle 31G X 8 MM Use 4 times daily 100 each 11    KETOSTIX test strip Use as directed in case of high glucose, vomiting or illness. 50 strip 3    LYUMJEV KWIKPEN 100 UNIT/ML SOPN INJECT SUBCUTANEOUSLY AS  DIRECTED UP TO 60 UNITS DAILY 60 mL 3    rosuvastatin (CRESTOR) 20 MG tablet TAKE 1 TABLET BY MOUTH DAILY 90 tablet 3    thin (NO BRAND SPECIFIED) lancets Use with lanceting device 2x daily. To accompany: Blood Glucose Monitor Brands: per insurance. 200 each 3     No current facility-administered medications for this visit.              98.2  F (36.8  C)  as of 7/5/2023       Pulse: -- 95  as of 7/23/2024     BP: -- 134/83  as of 7/23/2024     Resp: -- 12  as of 7/5/2023     SpO2: -- 98%  as of 7/23/2024       On exam she is in no acute distress.      Recent Labs   Lab Test 12/14/24  1013 07/23/24  1631 07/23/24  1629 07/23/24  1520 09/02/23  1055 04/21/23  0717 08/25/22  0706 08/13/20  1638 01/21/20  0000 09/16/19  0818 09/16/19  0000   A1C 6.9*  --   --  6.5*   < > 6.5* 6.8*   < >  --   --   --    HEMOGLOBINA1  --   --   --   --   --   --   --   --  6.3*   --  7.1*   TSH 2.29  --   --   --   --   --  3.03   < >  --    < >  --    LDL 49  --   --   --   --  48  --    < >  --    < >  --    HDL 56  --   --   --   --  58  --    < >  --    < >  --    TRIG 87  --   --   --   --  110  --    < >  --    < >  --    CR  --   --  0.84  --   --  0.83  --    < >  --    < >  --    MICROL  --  <12.0  --   --   --  33.7  --    < >  --    < >  --     < > = values in this interval not displayed.       Assessment and plan:    1.  Diabetes control.  Her A1c is at target and she is not having hypoglycemia.  I made no changes in her insulin doses today.    2.  Diabetes complications.  She is up-to-date with her eye visits and has no retinopathy.  Her feet are fine.  Renal function was checked about 6 months ago and is fine.    3.hyperlipidemia.  She is taking her Crestor and her LDL is well-controlled.    4.flank pain.  She has had this symptom for about 6 months.  It is not getting worse but is not getting better.  I will order a CT scan to determine if there is any anatomical lesion that is causing this.  Since her symptoms are completely unchanged, I am not sure we have the need to repeat all of her lab tests again.    Follow-up with Melonie Coughlin in 3 or 4 months and with me in about 8 months.    I spent 15 minutes on the video visit with the patient (start time 2: 3 0 and end time 2: 4 5).  On the same day of visit I spent an additional 10 minutes reviewing her chart, reviewing and interpreting labs, placing orders, and doing documentation.  The visit was done at my office away from clinic.    The longitudinal plan of care for the diagnosis(es)/condition(s) as documented were addressed during this visit. Due to the added complexity in care, I will continue to support Coral in the subsequent management and with ongoing continuity of care.     Henna Ness MD

## 2025-01-30 ENCOUNTER — TELEPHONE (OUTPATIENT)
Dept: ENDOCRINOLOGY | Facility: CLINIC | Age: 55
End: 2025-01-30
Payer: COMMERCIAL

## 2025-01-30 NOTE — TELEPHONE ENCOUNTER
Called patient and left voicemail. Patient has an appointment on  2/3/25 . Need patient to upload their Dexcom device to site for provider to review prior to their appointment.  Bobbi Grossaint, VF

## 2025-02-03 ENCOUNTER — VIRTUAL VISIT (OUTPATIENT)
Dept: ENDOCRINOLOGY | Facility: CLINIC | Age: 55
End: 2025-02-03
Payer: COMMERCIAL

## 2025-02-03 DIAGNOSIS — R10.9 FLANK PAIN: Primary | ICD-10-CM

## 2025-02-03 PROCEDURE — G2211 COMPLEX E/M VISIT ADD ON: HCPCS | Performed by: INTERNAL MEDICINE

## 2025-02-03 PROCEDURE — 98005 SYNCH AUDIO-VIDEO EST LOW 20: CPT | Performed by: INTERNAL MEDICINE

## 2025-02-03 NOTE — NURSING NOTE
Current patient location:  [place]    Is the patient currently in the state of [place]? YES    Visit mode: VIDEO    If the visit is dropped, the patient can be reconnected by:VIDEO VISIT: Text to cell phone:   Telephone Information:   Mobile [phone]       Will anyone else be joining the visit? NO  (If patient encounters technical issues they should call [phone]    Are changes needed to the allergy or medication list? No    Are refills needed on medications prescribed by this physician? NO    Rooming Documentation:  Questionnaire(s) completed    Reason for visit: Video Visit and RECHECK    Lizett NORRIS

## 2025-02-03 NOTE — LETTER
2/3/2025       RE: Coral Painting  [address]  [facility] [address]     Dear Colleague,    Thank you for referring your patient, Coral Painting, to the [facility] [place] at [facility]. Please see a copy of my visit note below.    Outcome for 01/20/25 4:24 PM: Moni message sent  Hyacinth Chávez MA  Outcome for 01/30/25 12:19 PM: Left Voicemail   Bobbilynn Grossaint, CAMILLA  Outcome for 01/31/25 7:05 AM: Data obtained via Dexcom website  Hyacinth Chávez MA    This 54 -year-old woman with longstanding type 1 diabetes was seen in follow-up.  She is comanaged with Melonie Coughlin who saw her a few months ago.  She is currently taking 40 units of Toujeo each day.  She continues to take Lyumjev 3 units per 15 g of carb.  She is wearing a Dexcom sensor and data are below.  Overall she is pretty happy with her blood sugars.  She does watch her sensor a lot and will anticipate a low and treated it when she is in the 80s.  She has no problems recognizing her low blood sugars.  She does not think she has them too frequently.  She exercises by walking her dog.    Saw her ophthalmologist in November 2024 and had no retinopathy.  She denies having paresthesias or foot ulcers.  She continues to take her Crestor.    She continues to have right sided flank pain.  She told me about this when I saw her in July.  Laboratory test done at that time were unremarkable.  The symptoms have not worsened but they continue to be present.  The present most days of the week for uncertain periods of time.  She has not had any nausea vomiting diarrhea constipation or bloody stools.  She has not lost any weight.  She has no chest pain or shortness of breath.  It just feels like dull pain in her kidney on the right.                Current Outpatient Medications   Medication Sig Dispense Refill     blood glucose (NO BRAND SPECIFIED) test strip Use to  test blood sugar 2 times daily or as directed. To accompany: Blood Glucose Monitor Brands: per insurance. 200 strip 3     blood glucose (NO BRAND SPECIFIED) test strip Use to test blood sugar 2 times daily or as directed. 200 strip 3     blood glucose monitoring (NO BRAND SPECIFIED) meter device kit Use to test blood sugar 2 times daily or as directed. Preferred blood glucose meter OR supplies to accompany: Blood Glucose Monitor Brands: per insurance. 1 kit 0     blood glucose monitoring (NO BRAND SPECIFIED) meter device kit Use to test blood sugar 4 times daily or as directed. Any covered brand. 1 kit 0     Continuous Blood Gluc  (DEXCOM G6 ) SUJEY Use to read blood sugars as per 's instructions. 1 each 0     Continuous Blood Gluc Transmit (DEXCOM G6 TRANSMITTER) MISC Change every 3 months. 1 each 3     Continuous Glucose Sensor (DEXCOM G6 SENSOR) MISC CHANGE EVERY 10 DAYS 9 each 3     insulin glargine U-300 (TOUJEO SOLOSTAR) 300 UNIT/ML (1 units dial) pen INJECT SUBCUTANEOUSLY AS  DIRECTED UP TO 42 UNITS DAILY 45 mL 3     insulin pen needle (B-D U/F) 31G X 8 MM miscellaneous USE 1 NEEDLE TO INJECT  SUBCUTANEOUSLY 4 TIMES DAILY AS  DIRECTED 400 each 3     insulin pen needle 31G X 8 MM Use 4 times daily 100 each 11     KETOSTIX test strip Use as directed in case of high glucose, vomiting or illness. 50 strip 3     LYUMJEV KWIKPEN 100 UNIT/ML SOPN INJECT SUBCUTANEOUSLY AS  DIRECTED UP TO 60 UNITS DAILY 60 mL 3     rosuvastatin (CRESTOR) 20 MG tablet TAKE 1 TABLET BY MOUTH DAILY 90 tablet 3     thin (NO BRAND SPECIFIED) lancets Use with lanceting device 2x daily. To accompany: Blood Glucose Monitor Brands: per insurance. 200 each 3     No current facility-administered medications for this visit.              98.2  F (36.8  C)  as of 7/5/2023       Pulse: -- 95  as of 7/23/2024     BP: -- 134/83  as of 7/23/2024     Resp: -- 12  as of 7/5/2023     SpO2: -- 98%  as of 7/23/2024       On exam  she is in no acute distress.      Recent Labs   Lab Test 12/14/24  1013 07/23/24  1631 07/23/24  1629 07/23/24  1520 09/02/23  1055 04/21/23  0717 08/25/22  0706 08/13/20  1638 01/21/20  0000 09/16/19  0818 09/16/19  0000   A1C 6.9*  --   --  6.5*   < > 6.5* 6.8*   < >  --   --   --    HEMOGLOBINA1  --   --   --   --   --   --   --   --  6.3*  --  7.1*   TSH 2.29  --   --   --   --   --  3.03   < >  --    < >  --    LDL 49  --   --   --   --  48  --    < >  --    < >  --    HDL 56  --   --   --   --  58  --    < >  --    < >  --    TRIG 87  --   --   --   --  110  --    < >  --    < >  --    CR  --   --  0.84  --   --  0.83  --    < >  --    < >  --    MICROL  --  <12.0  --   --   --  33.7  --    < >  --    < >  --     < > = values in this interval not displayed.       Assessment and plan:    1.  Diabetes control.  Her A1c is at target and she is not having hypoglycemia.  I made no changes in her insulin doses today.    2.  Diabetes complications.  She is up-to-date with her eye visits and has no retinopathy.  Her feet are fine.  Renal function was checked about 6 months ago and is fine.    3.hyperlipidemia.  She is taking her Crestor and her LDL is well-controlled.    4.flank pain.  She has had this symptom for about 6 months.  It is not getting worse but is not getting better.  I will order a CT scan to determine if there is any anatomical lesion that is causing this.  Since her symptoms are completely unchanged, I am not sure we have the need to repeat all of her lab tests again.    Follow-up with Melonie Coughlin in 3 or 4 months and with me in about 8 months.    I spent 15 minutes on the video visit with the patient (start time 2: 3 0 and end time 2: 4 5).  On the same day of visit I spent an additional 10 minutes reviewing her chart, reviewing and interpreting labs, placing orders, and doing documentation.  The visit was done at my office away from clinic.    The longitudinal plan of care for the  diagnosis(es)/condition(s) as documented were addressed during this visit. Due to the added complexity in care, I will continue to support Coral in the subsequent management and with ongoing continuity of care.     Henna Ness MD      Again, thank you for allowing me to participate in the care of your patient.      Sincerely,    Henna Ness MD

## 2025-05-10 ENCOUNTER — HEALTH MAINTENANCE LETTER (OUTPATIENT)
Age: 55
End: 2025-05-10

## 2025-06-01 ENCOUNTER — OFFICE VISIT (OUTPATIENT)
Dept: URGENT CARE | Facility: URGENT CARE | Age: 55
End: 2025-06-01
Payer: COMMERCIAL

## 2025-06-01 ENCOUNTER — HOSPITAL ENCOUNTER (OUTPATIENT)
Dept: GENERAL RADIOLOGY | Facility: HOSPITAL | Age: 55
Discharge: HOME | End: 2025-06-01
Attending: PHYSICIAN ASSISTANT | Admitting: PHYSICIAN ASSISTANT
Payer: COMMERCIAL

## 2025-06-01 VITALS
WEIGHT: 180 LBS | BODY MASS INDEX: 35.34 KG/M2 | TEMPERATURE: 98.1 F | HEIGHT: 60 IN | OXYGEN SATURATION: 95 % | RESPIRATION RATE: 20 BRPM | DIASTOLIC BLOOD PRESSURE: 78 MMHG | SYSTOLIC BLOOD PRESSURE: 132 MMHG | HEART RATE: 103 BPM

## 2025-06-01 DIAGNOSIS — S82.142A CLOSED FRACTURE OF LEFT TIBIAL PLATEAU, INITIAL ENCOUNTER: Primary | ICD-10-CM

## 2025-06-01 PROCEDURE — 3078F DIAST BP <80 MM HG: CPT | Performed by: PHYSICIAN ASSISTANT

## 2025-06-01 PROCEDURE — 73562 X-RAY EXAM OF KNEE 3: CPT | Mod: LT

## 2025-06-01 PROCEDURE — 99214 OFFICE O/P EST MOD 30 MIN: CPT | Performed by: PHYSICIAN ASSISTANT

## 2025-06-01 PROCEDURE — 3075F SYST BP GE 130 - 139MM HG: CPT | Performed by: PHYSICIAN ASSISTANT

## 2025-06-01 NOTE — PROGRESS NOTES
Assessment & Plan:      Problem List Items Addressed This Visit    None  Visit Diagnoses         Closed fracture of left tibial plateau, initial encounter    -  Primary    Relevant Orders    XR Knee Left 3 Views (Completed)    Crutches Order for DME - ONLY FOR DME    Ankle/Knee Bracing Supplies Order Knee Immobilizer; Left    Orthopedic  Referral          Medical Decision Making  Patient presents for an acute injury to the left knee.  X-ray does show a tibial plateau fracture of the lateral tibia with mild displacement.  Patient provided with a knee immobilizer and crutches.  Follow-up with orthopedics in 3 days for further evaluation and treatment as needed.  Continue with cool compresses and over-the-counter analgesics as needed.     Subjective:      Coral Painting is a 54 year old female here for evaluation of left knee pain.  Event of injury occurred today.  Patient was over at her mother's house when her 45 pound dog clipped the patient's left knee.  Patient was doubled over in pain and has had significant difficulties walking ever since the injury.  Patient did apply a cold compress.     The following portions of the patient's history were reviewed and updated as appropriate: allergies, current medications, and problem list.     Review of Systems  Pertinent items are noted in HPI.    Allergies  No Known Allergies    No family history on file.    Social History     Tobacco Use    Smoking status: Never    Smokeless tobacco: Never   Substance Use Topics    Alcohol use: Yes     Alcohol/week: 3.0 standard drinks of alcohol     Types: 3 Standard drinks or equivalent per week        Objective:      /78   Pulse 103   Temp 98.1  F (36.7  C) (Oral)   Resp 20   Ht 1.524 m (5')   Wt 81.6 kg (180 lb)   SpO2 95%   BMI 35.15 kg/m    General appearance - alert, well appearing, and in no distress and non-toxic  Extremities - left lower extremity: Tenderness at the tibial plateau, otherwise no joint  laxity  Skin - no obvious swelling, ecchymosis, erythema, skin is normal warmth to touch, skin intact     Lab & Imaging Results    Results for orders placed or performed in visit on 06/01/25   XR Knee Left 3 Views     Status: None    Narrative    EXAM: XR KNEE LEFT 3 VIEWS  LOCATION: [facility]  DATE: 6/1/2025    INDICATION: direct trauma to left lateral knee from a 45 lb dog; rule out acute fracture  COMPARISON: None.      Impression    IMPRESSION: Acute lateral tibial plateau fracture with depression of the articular surface. Moderate joint effusion with lipohemarthrosis. Normal knee joint alignment with maintained joint spaces.       I personally reviewed these results and discussed findings with the patient.    The use of Dragon/[employer] dictation services was used to construct the content of this note; any grammatical errors are non-intentional. Please contact the author directly if you are in need of any clarification.

## 2025-06-03 ENCOUNTER — TRANSFERRED RECORDS (OUTPATIENT)
Dept: HEALTH INFORMATION MANAGEMENT | Facility: CLINIC | Age: 55
End: 2025-06-03
Payer: COMMERCIAL

## 2025-06-13 ENCOUNTER — MYC MEDICAL ADVICE (OUTPATIENT)
Dept: ENDOCRINOLOGY | Facility: CLINIC | Age: 55
End: 2025-06-13
Payer: COMMERCIAL

## 2025-06-13 DIAGNOSIS — E10.9 WELL CONTROLLED TYPE 1 DIABETES MELLITUS (H): Primary | ICD-10-CM

## 2025-06-16 NOTE — TELEPHONE ENCOUNTER
RESULTS  Lab Results   Component Value Date    A1C 6.9 (H) 12/14/2024    A1C 6.5 (H) 07/23/2024    A1C 8.2 (H) 12/02/2023    A1C 7.1 (H) 09/02/2023    A1C 6.5 (H) 04/21/2023    A1C 6.8 (H) 08/25/2022    A1C 8.1 (H) 03/22/2021    A1C 7.0 (H) 08/13/2020    A1C 7.7 (H) 11/06/2017    A1C 6.6 (A) 08/05/2013    A1C 7.5 (A) 02/04/2013    HEMOGLOBINA1 6.3 (A) 01/21/2020    HEMOGLOBINA1 7.1 (A) 09/16/2019    HEMOGLOBINA1 6.6 (A) 06/03/2019    HEMOGLOBINA1 7.2 (A) 08/20/2018    HEMOGLOBINA1 6.9 (A) 05/07/2018       TSH   Date Value Ref Range Status   12/14/2024 2.29 0.30 - 4.20 uIU/mL Final   08/25/2022 3.03 0.40 - 4.00 mU/L Final   08/13/2020 2.05 0.40 - 4.00 mU/L Final   09/16/2019 2.29 0.40 - 4.00 mU/L Final   08/14/2017 2.17 0.40 - 4.00 mU/L Final   10/24/2016 2.80 0.40 - 4.00 mU/L Final   08/13/2007 1.86 0.4 - 5.0 mU/L Final     T4 Free   Date Value Ref Range Status   08/13/2007 1.02 0.70 - 1.85 ng/dL Final       ALT   Date Value Ref Range Status   07/23/2024 15 0 - 50 U/L Final   09/16/2019 15 0 - 50 U/L Final   06/04/2012 12 0 - 50 U/L Final   ]    Recent Labs   Lab Test 12/14/24  1013 04/21/23  0717   CHOL 122 128   HDL 56 58   LDL 49 48   TRIG 87 110       Lab Results   Component Value Date     07/23/2024     03/22/2021      Lab Results   Component Value Date    POTASSIUM 4.4 07/23/2024    POTASSIUM 4.6 11/11/2021    POTASSIUM 4.1 03/22/2021     Lab Results   Component Value Date    CHLORIDE 103 07/23/2024    CHLORIDE 106 11/11/2021    CHLORIDE 106 03/22/2021     Lab Results   Component Value Date    MICHELLE 8.5 11/11/2021    MICHELLE 8.7 03/22/2021     Lab Results   Component Value Date    CO2 28 07/23/2024    CO2 22 11/11/2021    CO2 24 03/22/2021     Lab Results   Component Value Date    BUN 14.1 07/23/2024    BUN 10 11/11/2021    BUN 12 03/22/2021     Lab Results   Component Value Date    CR 0.84 07/23/2024    CR 0.72 03/22/2021       GFR Estimate   Date Value Ref Range Status   07/23/2024 82 >60 mL/min/1.73m2  Final     Comment:     eGFR calculated using 2021 CKD-EPI equation.   04/21/2023 84 >60 mL/min/1.73m2 Final     Comment:     eGFR calculated using 2021 CKD-EPI equation.   11/11/2021 >90 >60 mL/min/1.73m2 Final     Comment:     As of July 11, 2021, eGFR is calculated by the CKD-EPI creatinine equation, without race adjustment. eGFR can be influenced by muscle mass, exercise, and diet. The reported eGFR is an estimation only and is only applicable if the renal function is stable.   03/22/2021 >90 >60 mL/min/[1.73_m2] Final     Comment:     Non  GFR Calc  Starting 12/18/2018, serum creatinine based estimated GFR (eGFR) will be   calculated using the Chronic Kidney Disease Epidemiology Collaboration   (CKD-EPI) equation.     08/13/2020 88 >60 mL/min/[1.73_m2] Final     Comment:     Non  GFR Calc  Starting 12/18/2018, serum creatinine based estimated GFR (eGFR) will be   calculated using the Chronic Kidney Disease Epidemiology Collaboration   (CKD-EPI) equation.     09/16/2019 >90 >60 mL/min/[1.73_m2] Final     Comment:     Non  GFR Calc  Starting 12/18/2018, serum creatinine based estimated GFR (eGFR) will be   calculated using the Chronic Kidney Disease Epidemiology Collaboration   (CKD-EPI) equation.       GFR Estimate If Black   Date Value Ref Range Status   03/22/2021 >90 >60 mL/min/[1.73_m2] Final     Comment:      GFR Calc  Starting 12/18/2018, serum creatinine based estimated GFR (eGFR) will be   calculated using the Chronic Kidney Disease Epidemiology Collaboration   (CKD-EPI) equation.     08/13/2020 >90 >60 mL/min/[1.73_m2] Final     Comment:      GFR Calc  Starting 12/18/2018, serum creatinine based estimated GFR (eGFR) will be   calculated using the Chronic Kidney Disease Epidemiology Collaboration   (CKD-EPI) equation.     09/16/2019 >90 >60 mL/min/[1.73_m2] Final     Comment:      GFR Calc  Starting 12/18/2018,  "serum creatinine based estimated GFR (eGFR) will be   calculated using the Chronic Kidney Disease Epidemiology Collaboration   (CKD-EPI) equation.         Lab Results   Component Value Date    MICROL <12.0 07/23/2024    MICROL 14 11/11/2021    MICROL 11 08/13/2020     No results found for: \"MICROALBUMIN\"  No results found for: \"CPEPT\", \"GADAB\", \"ISCAB\"    Vitamin B12   Date Value Ref Range Status   11/11/2021 244 193 - 986 pg/mL Final   ]    Tissue Transglutaminase Antibody IgA   Date Value Ref Range Status   12/14/2024 0.8 <7.0 U/mL Final     Comment:     Negative- The tTG-IgA assay has limited utility for patients with decreased levels of IgA. Screening for celiac disease should include IgA testing to rule out selective IgA deficiency and to guide selection and interpretation of serological testing. tTG-IgG testing may be positive in celiac disease patients with IgA deficiency.     Tissue Transglutaminase Antibody IgG   Date Value Ref Range Status   12/14/2024 <0.6 <7.0 U/mL Final     Comment:     Negative         Most recent eye exam date: : Not Found     FIB-4 Calculation: 1.33 at 7/23/2024  4:29 PM  Calculated from:  SGOT/AST: 19 U/L at 7/23/2024  4:29 PM  SGPT/ALT: 15 U/L at 7/23/2024  4:29 PM  Platelets: 199 10e3/uL at 7/23/2024  4:29 PM  Age: 53 years  A value of FIB-4 below 1.30 is considered as low risk for advanced fibrosis; a value of FIB-4 over 2.67 is considered as high risk for advanced fibrosis; and FIB-4 values between 1.30 and 2.67 are considered as intermediate risk of advanced fibrosis.    No results found for: \"GADAB\"  No results found for: \"CPEPT\"      "

## 2025-06-16 NOTE — PROGRESS NOTES
Virtual Visit Details    Type of service:  Video Visit     Originating Location (pt. Location): Home    Distant Location (provider location):  On-site  Platform used for Video Visit: oRb          Outcome for 06/16/25 9:03 AM: StudyTube message sent  Hyacinth Chávez MA  Outcome for 06/26/25 12:42 PM: Data obtained via Dexcom website  Hyacinth Chávez MA      Start time: 0838  End time: 0902  Provider location: off site- home  Patient location: off site- home.    HPI:  Ms. Painting is a 53 yo woman here for follow up of type 1 diabetes.  She also sees Dr. Ness. Since her last visit, her glucose has been running higher.  She sustained a tibial plateau fracture after a dog ran into her at the dog park.  Not much activity lately.  She remains on Toujeo and lyumjev.  No severe hypoglycemia.       Felt like she was having some ups and downs.  Going lower at night.  She is currently taking Toujeo 40 units daily.  She takes short acting- 3 units/15g CHO with meals plus correction of 1/25 over 175 mg/dL (average 10-15 units at a time).  She sometimes backs off her her mealtime coverage a bit.   TDD- 70 units    We previously talked about the In-pen, and pumps but she has privacy concerns and prefers not to have any visible diabetes tech on her body.  She continues using the dexcom sensor.   Having issues with sensor not working well last couple days.  Sensor may be coming loose.  Will try adhesive patches.     For the past few visits, she discussed having slight, dull pain on the right side of her back- feels it more in the evenings.  Right lower side.  Feels like it is right where her kidney should be. This has been going on intermittently for at least a year. Urine was normal. No fevers, chills, night sweats, weight loss or other new symptoms. Dr. Ness and I have both recommended CT scan, but she has not yet scheduled this. Seems better lately.     Had eye exam- no changes in retinopathy- mild.      Typical  diet:   Breakfast- cereal with banana (cheerios or grape nuts).  Sometimes toast, omelette.   Sometimes eats salads all week, more fruit in the summer than winter.    Tuna sandwich, more pork lately. She doesn't like a lot of vegetables, mostly fruits.   [place] at home.     She has no other concerns today.                     RESULTS  Lab Results   Component Value Date    A1C 6.7 (H) 06/21/2025    A1C 6.9 (H) 12/14/2024    A1C 6.5 (H) 07/23/2024    A1C 8.2 (H) 12/02/2023    A1C 7.1 (H) 09/02/2023    A1C 6.5 (H) 04/21/2023    A1C 8.1 (H) 03/22/2021    A1C 7.0 (H) 08/13/2020    A1C 7.7 (H) 11/06/2017    A1C 6.6 (A) 08/05/2013    A1C 7.5 (A) 02/04/2013    HEMOGLOBINA1 6.3 (A) 01/21/2020    HEMOGLOBINA1 7.1 (A) 09/16/2019    HEMOGLOBINA1 6.6 (A) 06/03/2019    HEMOGLOBINA1 7.2 (A) 08/20/2018    HEMOGLOBINA1 6.9 (A) 05/07/2018       TSH   Date Value Ref Range Status   12/14/2024 2.29 0.30 - 4.20 uIU/mL Final   08/25/2022 3.03 0.40 - 4.00 mU/L Final   08/13/2020 2.05 0.40 - 4.00 mU/L Final   09/16/2019 2.29 0.40 - 4.00 mU/L Final   08/14/2017 2.17 0.40 - 4.00 mU/L Final   10/24/2016 2.80 0.40 - 4.00 mU/L Final   08/13/2007 1.86 0.4 - 5.0 mU/L Final     T4 Free   Date Value Ref Range Status   08/13/2007 1.02 0.70 - 1.85 ng/dL Final       ALT   Date Value Ref Range Status   07/23/2024 15 0 - 50 U/L Final   09/16/2019 15 0 - 50 U/L Final   06/04/2012 12 0 - 50 U/L Final   ]    Recent Labs   Lab Test 12/14/24  1013 04/21/23  0717   CHOL 122 128   HDL 56 58   LDL 49 48   TRIG 87 110       Lab Results   Component Value Date     06/21/2025     03/22/2021      Lab Results   Component Value Date    POTASSIUM 4.2 06/21/2025    POTASSIUM 4.6 11/11/2021    POTASSIUM 4.1 03/22/2021     Lab Results   Component Value Date    CHLORIDE 106 06/21/2025    CHLORIDE 106 11/11/2021    CHLORIDE 106 03/22/2021     Lab Results   Component Value Date    MICHELLE 9.7 06/21/2025    MICHELLE 8.7 03/22/2021     Lab Results   Component Value Date     CO2 27 06/21/2025    CO2 22 11/11/2021    CO2 24 03/22/2021     Lab Results   Component Value Date    BUN 14.6 06/21/2025    BUN 10 11/11/2021    BUN 12 03/22/2021     Lab Results   Component Value Date    CR 0.75 06/21/2025    CR 0.72 03/22/2021       GFR Estimate   Date Value Ref Range Status   06/21/2025 >90 >60 mL/min/1.73m2 Final     Comment:     eGFR calculated using 2021 CKD-EPI equation.   07/23/2024 82 >60 mL/min/1.73m2 Final     Comment:     eGFR calculated using 2021 CKD-EPI equation.   04/21/2023 84 >60 mL/min/1.73m2 Final     Comment:     eGFR calculated using 2021 CKD-EPI equation.   03/22/2021 >90 >60 mL/min/[1.73_m2] Final     Comment:     Non  GFR Calc  Starting 12/18/2018, serum creatinine based estimated GFR (eGFR) will be   calculated using the Chronic Kidney Disease Epidemiology Collaboration   (CKD-EPI) equation.     08/13/2020 88 >60 mL/min/[1.73_m2] Final     Comment:     Non  GFR Calc  Starting 12/18/2018, serum creatinine based estimated GFR (eGFR) will be   calculated using the Chronic Kidney Disease Epidemiology Collaboration   (CKD-EPI) equation.     09/16/2019 >90 >60 mL/min/[1.73_m2] Final     Comment:     Non  GFR Calc  Starting 12/18/2018, serum creatinine based estimated GFR (eGFR) will be   calculated using the Chronic Kidney Disease Epidemiology Collaboration   (CKD-EPI) equation.       GFR Estimate If Black   Date Value Ref Range Status   03/22/2021 >90 >60 mL/min/[1.73_m2] Final     Comment:      GFR Calc  Starting 12/18/2018, serum creatinine based estimated GFR (eGFR) will be   calculated using the Chronic Kidney Disease Epidemiology Collaboration   (CKD-EPI) equation.     08/13/2020 >90 >60 mL/min/[1.73_m2] Final     Comment:      GFR Calc  Starting 12/18/2018, serum creatinine based estimated GFR (eGFR) will be   calculated using the Chronic Kidney Disease Epidemiology Collaboration   (CKD-EPI)  "equation.     09/16/2019 >90 >60 mL/min/[1.73_m2] Final     Comment:      GFR Calc  Starting 12/18/2018, serum creatinine based estimated GFR (eGFR) will be   calculated using the Chronic Kidney Disease Epidemiology Collaboration   (CKD-EPI) equation.         Lab Results   Component Value Date    MICROL 20.0 06/21/2025    MICROL 14 11/11/2021    MICROL 11 08/13/2020     No results found for: \"MICROALBUMIN\"  No results found for: \"CPEPT\", \"GADAB\", \"ISCAB\"    Vitamin B12   Date Value Ref Range Status   11/11/2021 244 193 - 986 pg/mL Final   ]    Tissue Transglutaminase Antibody IgA   Date Value Ref Range Status   06/21/2025 0.6 <7.0 U/mL Final     Comment:     Negative- The tTG-IgA assay has limited utility for patients with decreased levels of IgA. Screening for celiac disease should include IgA testing to rule out selective IgA deficiency and to guide selection and interpretation of serological testing. tTG-IgG testing may be positive in celiac disease patients with IgA deficiency.     Tissue Transglutaminase Antibody IgG   Date Value Ref Range Status   06/21/2025 <0.6 <7.0 U/mL Final     Comment:     Negative       Most recent eye exam date: : Not Found     FIB-4 Calculation: 1.33 at 7/23/2024  4:29 PM  Calculated from:  SGOT/AST: 19 U/L at 7/23/2024  4:29 PM  SGPT/ALT: 15 U/L at 7/23/2024  4:29 PM  Platelets: 199 10e3/uL at 7/23/2024  4:29 PM  Age: 53 years  A value of FIB-4 below 1.30 is considered as low risk for advanced fibrosis; a value of FIB-4 over 2.67 is considered as high risk for advanced fibrosis; and FIB-4 values between 1.30 and 2.67 are considered as intermediate risk of advanced fibrosis.    No results found for: \"GADAB\"  No results found for: \"CPEPT\"          Assessment   Assessment/Plan:      1.  Type 1 DM-   Coral's glucose is well controlled, but a bit variable (CV 36%).  A1c is 6.7% and she is no longer having hypoglycemia since reducing toujeo.  Briefly discussed Omnipod 5 " pump with PDM usage (concerned about data sharing/privacy)- may consider in the future to help lower her variability.     She continues using the dexcom sensor.   Having issues with sensor not working well last couple days.  Sensor may be coming loose.  Will try adhesive patches. We made the following plan today (instructions given to patient):      Lower Tresiba to 39 units    Consider increasing mealtime insulin to 3.5 units/carb.      Consider Omnipod 5 pump.  [email or web address]    Emergency issues: Here are some concerns you should contact us about.  -Vomiting: more than twice.  Please check ketones.  If positive, go to ER. Monitor glucose hourly.   -High glucose (over 300 mg/dL twice in a row): Please check ketones.  If ketones are negative, take an insulin correction and recheck glucose in 1 hour.  If glucose is not coming down, please call the clinic. If ketones are moderate or large, drink lots of water, take an insulin correction 1.5 times your usual correction, and recheck ketones in 1 hour.  If ketones are still present (or you are vomiting), go to the ER.-Hypoglycemia (low glucose):   If glucose is less than 70 mg/dL, treat with 15g carb (4 glucose tablets), recheck glucose in 15 minutes.  If low again, repeat.   If glucose is less than 54 mg/dL, treat with 30g carb, recheck glucose in 15 minutes.  If low again, repeat.  Keep glucagon in your home in case of severe hypoglycemia and train someone how to use this.    Emergency kit (please ensure you always have these with you):   Glucose tablets  Glucagon  Insulin  Syringes/needles   Extra infusion set (if on a pump)  Ketone strips    Contact information:   If you have concerns, please send me a [employer] message or call the clinic at [phone].  For more urgent concerns, please call [phone] after hours/weekends and ask to speak with the endocrinologist on call.      Please let me know if you are having low blood sugars less than 70 or over 350 mg/dL.   Do not wait until your next appointment if this is happening.      2.  Risk factors- BP under good control.  Creatinine normal.  No MA.  Lipids well controlled on statin. Had recent eye exam with mild, non-proliferative retinopathy.      3.  Flank pain- this is better. Prior UA was normal.  Will schedule CT abdomen pelvis.    4. F/U in 3 months with Dr. Ness, 6 mos with me.  Call sooner with concerns.      I spent a total of 30 minutes on the date of encounter reviewing medical records, evaluating the patient, coordinating care and  documenting in the EHR, as detailed above, exclusive of CGM time.    Melonie Coughlin PA-C, MPAS   [facility]  Department of Medicine  Division of Endocrinology and Diabetes

## 2025-06-21 ENCOUNTER — APPOINTMENT (OUTPATIENT)
Dept: LAB | Facility: CLINIC | Age: 55
End: 2025-06-21
Payer: COMMERCIAL

## 2025-06-21 LAB
ANION GAP SERPL CALCULATED.3IONS-SCNC: 9 MMOL/L (ref 7–15)
BUN SERPL-MCNC: 14.6 MG/DL (ref 6–20)
CALCIUM SERPL-MCNC: 9.7 MG/DL (ref 8.8–10.4)
CHLORIDE SERPL-SCNC: 106 MMOL/L (ref 98–107)
CREAT SERPL-MCNC: 0.75 MG/DL (ref 0.51–0.95)
CREAT UR-MCNC: 255 MG/DL
EGFRCR SERPLBLD CKD-EPI 2021: >90 ML/MIN/1.73M2
EST. AVERAGE GLUCOSE BLD GHB EST-MCNC: 146 MG/DL
GLUCOSE SERPL-MCNC: 133 MG/DL (ref 70–99)
HBA1C MFR BLD: 6.7 % (ref 0–5.6)
HCO3 SERPL-SCNC: 27 MMOL/L (ref 22–29)
MICROALBUMIN UR-MCNC: 20 MG/L
MICROALBUMIN/CREAT UR: 7.84 MG/G CR (ref 0–25)
POTASSIUM SERPL-SCNC: 4.2 MMOL/L (ref 3.4–5.3)
SODIUM SERPL-SCNC: 142 MMOL/L (ref 135–145)

## 2025-06-21 PROCEDURE — 99000 SPECIMEN HANDLING OFFICE-LAB: CPT | Performed by: PATHOLOGY

## 2025-06-21 PROCEDURE — 82570 ASSAY OF URINE CREATININE: CPT | Performed by: PHYSICIAN ASSISTANT

## 2025-06-21 PROCEDURE — 80048 BASIC METABOLIC PNL TOTAL CA: CPT | Performed by: PHYSICIAN ASSISTANT

## 2025-06-21 PROCEDURE — 86364 TISS TRNSGLTMNASE EA IG CLAS: CPT | Performed by: PHYSICIAN ASSISTANT

## 2025-06-25 LAB
TTG IGA SER-ACNC: 0.6 U/ML
TTG IGG SER-ACNC: <0.6 U/ML

## 2025-06-27 ENCOUNTER — RESULTS FOLLOW-UP (OUTPATIENT)
Dept: ENDOCRINOLOGY | Facility: CLINIC | Age: 55
End: 2025-06-27

## 2025-06-30 ENCOUNTER — VIRTUAL VISIT (OUTPATIENT)
Dept: ENDOCRINOLOGY | Facility: CLINIC | Age: 55
End: 2025-06-30
Payer: COMMERCIAL

## 2025-06-30 DIAGNOSIS — E10.9 WELL CONTROLLED TYPE 1 DIABETES MELLITUS (H): Primary | ICD-10-CM

## 2025-06-30 PROCEDURE — 98006 SYNCH AUDIO-VIDEO EST MOD 30: CPT | Performed by: PHYSICIAN ASSISTANT

## 2025-06-30 PROCEDURE — 1126F AMNT PAIN NOTED NONE PRSNT: CPT | Mod: 95 | Performed by: PHYSICIAN ASSISTANT

## 2025-06-30 NOTE — NURSING NOTE
Current patient location: [address]   [facility] [address]    Is the patient currently in the state of [place]? YES    Visit mode: VIDEO    If the visit is dropped, the patient can be reconnected by:VIDEO VISIT: Text to cell phone:   Telephone Information:   Mobile [phone]       Will anyone else be joining the visit? NO  (If patient encounters technical issues they should call [phone]    Are changes needed to the allergy or medication list? No    Are refills needed on medications prescribed by this physician? NO    Rooming Documentation:  Not applicable    Reason for visit: JONY NORRIS

## 2025-06-30 NOTE — LETTER
6/30/2025       RE: Coral Painting  [address]  [facility] [address]     Dear Colleague,    Thank you for referring your patient, Coral Painting, to the [facility] [place] [facility]. Please see a copy of my visit note below.    Virtual Visit Details    Type of service:  Video Visit     Originating Location (pt. Location): Home    Distant Location (provider location):  On-site  Platform used for Video Visit: Buzzient          Outcome for 06/16/25 9:03 AM: Spinzo message sent  Hyacinth Chávez MA  Outcome for 06/26/25 12:42 PM: Data obtained via Dexcom website  Hyacinth Chávez MA      Start time: 0838  End time: 0902  Provider location: off site- home  Patient location: off site- home.    HPI:  Ms. Painting is a 53 yo woman here for follow up of type 1 diabetes.  She also sees Dr. Ness. Since her last visit, her glucose has been running higher.  She sustained a tibial plateau fracture after a dog ran into her at the dog park.  Not much activity lately.  She remains on Toujeo and lyumjev.  No severe hypoglycemia.       Felt like she was having some ups and downs.  Going lower at night.  She is currently taking Toujeo 40 units daily.  She takes short acting- 3 units/15g CHO with meals plus correction of 1/25 over 175 mg/dL (average 10-15 units at a time).  She sometimes backs off her her mealtime coverage a bit.   TDD- 70 units    We previously talked about the In-pen, and pumps but she has privacy concerns and prefers not to have any visible diabetes tech on her body.  She continues using the dexcom sensor.   Having issues with sensor not working well last couple days.  Sensor may be coming loose.  Will try adhesive patches.     For the past few visits, she discussed having slight, dull pain on the right side of her back- feels it more in the evenings.  Right lower side.  Feels like it is right where her  kidney should be. This has been going on intermittently for at least a year. Urine was normal. No fevers, chills, night sweats, weight loss or other new symptoms. Dr. Ness and I have both recommended CT scan, but she has not yet scheduled this. Seems better lately.     Had eye exam- no changes in retinopathy- mild.      Typical diet:   Breakfast- cereal with banana (cheerios or grape nuts).  Sometimes toast, omelette.   Sometimes eats salads all week, more fruit in the summer than winter.    Tuna sandwich, more pork lately. She doesn't like a lot of vegetables, mostly fruits.   [place] at home.     She has no other concerns today.                     RESULTS  Lab Results   Component Value Date    A1C 6.7 (H) 06/21/2025    A1C 6.9 (H) 12/14/2024    A1C 6.5 (H) 07/23/2024    A1C 8.2 (H) 12/02/2023    A1C 7.1 (H) 09/02/2023    A1C 6.5 (H) 04/21/2023    A1C 8.1 (H) 03/22/2021    A1C 7.0 (H) 08/13/2020    A1C 7.7 (H) 11/06/2017    A1C 6.6 (A) 08/05/2013    A1C 7.5 (A) 02/04/2013    HEMOGLOBINA1 6.3 (A) 01/21/2020    HEMOGLOBINA1 7.1 (A) 09/16/2019    HEMOGLOBINA1 6.6 (A) 06/03/2019    HEMOGLOBINA1 7.2 (A) 08/20/2018    HEMOGLOBINA1 6.9 (A) 05/07/2018       TSH   Date Value Ref Range Status   12/14/2024 2.29 0.30 - 4.20 uIU/mL Final   08/25/2022 3.03 0.40 - 4.00 mU/L Final   08/13/2020 2.05 0.40 - 4.00 mU/L Final   09/16/2019 2.29 0.40 - 4.00 mU/L Final   08/14/2017 2.17 0.40 - 4.00 mU/L Final   10/24/2016 2.80 0.40 - 4.00 mU/L Final   08/13/2007 1.86 0.4 - 5.0 mU/L Final     T4 Free   Date Value Ref Range Status   08/13/2007 1.02 0.70 - 1.85 ng/dL Final       ALT   Date Value Ref Range Status   07/23/2024 15 0 - 50 U/L Final   09/16/2019 15 0 - 50 U/L Final   06/04/2012 12 0 - 50 U/L Final   ]    Recent Labs   Lab Test 12/14/24  1013 04/21/23  0717   CHOL 122 128   HDL 56 58   LDL 49 48   TRIG 87 110       Lab Results   Component Value Date     06/21/2025     03/22/2021      Lab Results   Component Value  Date    POTASSIUM 4.2 06/21/2025    POTASSIUM 4.6 11/11/2021    POTASSIUM 4.1 03/22/2021     Lab Results   Component Value Date    CHLORIDE 106 06/21/2025    CHLORIDE 106 11/11/2021    CHLORIDE 106 03/22/2021     Lab Results   Component Value Date    MICHELLE 9.7 06/21/2025    MICHELLE 8.7 03/22/2021     Lab Results   Component Value Date    CO2 27 06/21/2025    CO2 22 11/11/2021    CO2 24 03/22/2021     Lab Results   Component Value Date    BUN 14.6 06/21/2025    BUN 10 11/11/2021    BUN 12 03/22/2021     Lab Results   Component Value Date    CR 0.75 06/21/2025    CR 0.72 03/22/2021       GFR Estimate   Date Value Ref Range Status   06/21/2025 >90 >60 mL/min/1.73m2 Final     Comment:     eGFR calculated using 2021 CKD-EPI equation.   07/23/2024 82 >60 mL/min/1.73m2 Final     Comment:     eGFR calculated using 2021 CKD-EPI equation.   04/21/2023 84 >60 mL/min/1.73m2 Final     Comment:     eGFR calculated using 2021 CKD-EPI equation.   03/22/2021 >90 >60 mL/min/[1.73_m2] Final     Comment:     Non  GFR Calc  Starting 12/18/2018, serum creatinine based estimated GFR (eGFR) will be   calculated using the Chronic Kidney Disease Epidemiology Collaboration   (CKD-EPI) equation.     08/13/2020 88 >60 mL/min/[1.73_m2] Final     Comment:     Non  GFR Calc  Starting 12/18/2018, serum creatinine based estimated GFR (eGFR) will be   calculated using the Chronic Kidney Disease Epidemiology Collaboration   (CKD-EPI) equation.     09/16/2019 >90 >60 mL/min/[1.73_m2] Final     Comment:     Non  GFR Calc  Starting 12/18/2018, serum creatinine based estimated GFR (eGFR) will be   calculated using the Chronic Kidney Disease Epidemiology Collaboration   (CKD-EPI) equation.       GFR Estimate If Black   Date Value Ref Range Status   03/22/2021 >90 >60 mL/min/[1.73_m2] Final     Comment:      GFR Calc  Starting 12/18/2018, serum creatinine based estimated GFR (eGFR) will be  "  calculated using the Chronic Kidney Disease Epidemiology Collaboration   (CKD-EPI) equation.     08/13/2020 >90 >60 mL/min/[1.73_m2] Final     Comment:      GFR Calc  Starting 12/18/2018, serum creatinine based estimated GFR (eGFR) will be   calculated using the Chronic Kidney Disease Epidemiology Collaboration   (CKD-EPI) equation.     09/16/2019 >90 >60 mL/min/[1.73_m2] Final     Comment:      GFR Calc  Starting 12/18/2018, serum creatinine based estimated GFR (eGFR) will be   calculated using the Chronic Kidney Disease Epidemiology Collaboration   (CKD-EPI) equation.         Lab Results   Component Value Date    MICROL 20.0 06/21/2025    MICROL 14 11/11/2021    MICROL 11 08/13/2020     No results found for: \"MICROALBUMIN\"  No results found for: \"CPEPT\", \"GADAB\", \"ISCAB\"    Vitamin B12   Date Value Ref Range Status   11/11/2021 244 193 - 986 pg/mL Final   ]    Tissue Transglutaminase Antibody IgA   Date Value Ref Range Status   06/21/2025 0.6 <7.0 U/mL Final     Comment:     Negative- The tTG-IgA assay has limited utility for patients with decreased levels of IgA. Screening for celiac disease should include IgA testing to rule out selective IgA deficiency and to guide selection and interpretation of serological testing. tTG-IgG testing may be positive in celiac disease patients with IgA deficiency.     Tissue Transglutaminase Antibody IgG   Date Value Ref Range Status   06/21/2025 <0.6 <7.0 U/mL Final     Comment:     Negative       Most recent eye exam date: : Not Found     FIB-4 Calculation: 1.33 at 7/23/2024  4:29 PM  Calculated from:  SGOT/AST: 19 U/L at 7/23/2024  4:29 PM  SGPT/ALT: 15 U/L at 7/23/2024  4:29 PM  Platelets: 199 10e3/uL at 7/23/2024  4:29 PM  Age: 53 years  A value of FIB-4 below 1.30 is considered as low risk for advanced fibrosis; a value of FIB-4 over 2.67 is considered as high risk for advanced fibrosis; and FIB-4 values between 1.30 and 2.67 are considered " "as intermediate risk of advanced fibrosis.    No results found for: \"GADAB\"  No results found for: \"CPEPT\"          Assessment   Assessment/Plan:      1.  Type 1 DM-   Coral's glucose is well controlled, but a bit variable (CV 36%).  A1c is 6.7% and she is no longer having hypoglycemia since reducing toujeo.  Briefly discussed Omnipod 5 pump with PDM usage (concerned about data sharing/privacy)- may consider in the future to help lower her variability.     She continues using the dexcom sensor.   Having issues with sensor not working well last couple days.  Sensor may be coming loose.  Will try adhesive patches. We made the following plan today (instructions given to patient):      Lower Tresiba to 39 units    Consider increasing mealtime insulin to 3.5 units/carb.      Consider Omnipod 5 pump.  [email or web address]    Emergency issues: Here are some concerns you should contact us about.  -Vomiting: more than twice.  Please check ketones.  If positive, go to ER. Monitor glucose hourly.   -High glucose (over 300 mg/dL twice in a row): Please check ketones.  If ketones are negative, take an insulin correction and recheck glucose in 1 hour.  If glucose is not coming down, please call the clinic. If ketones are moderate or large, drink lots of water, take an insulin correction 1.5 times your usual correction, and recheck ketones in 1 hour.  If ketones are still present (or you are vomiting), go to the ER.-Hypoglycemia (low glucose):   If glucose is less than 70 mg/dL, treat with 15g carb (4 glucose tablets), recheck glucose in 15 minutes.  If low again, repeat.   If glucose is less than 54 mg/dL, treat with 30g carb, recheck glucose in 15 minutes.  If low again, repeat.  Keep glucagon in your home in case of severe hypoglycemia and train someone how to use this.    Emergency kit (please ensure you always have these with you):   Glucose tablets  Glucagon  Insulin  Syringes/needles   Extra infusion set (if on a pump)  Ketone " strips    Contact information:   If you have concerns, please send me a [employer] message or call the clinic at [phone].  For more urgent concerns, please call [phone] after hours/weekends and ask to speak with the endocrinologist on call.      Please let me know if you are having low blood sugars less than 70 or over 350 mg/dL.  Do not wait until your next appointment if this is happening.      2.  Risk factors- BP under good control.  Creatinine normal.  No MA.  Lipids well controlled on statin. Had recent eye exam with mild, non-proliferative retinopathy.      3.  Flank pain- this is better. Prior UA was normal.  Will schedule CT abdomen pelvis.    4. F/U in 3 months with Dr. Ness, 6 mos with me.  Call sooner with concerns.      I spent a total of 30 minutes on the date of encounter reviewing medical records, evaluating the patient, coordinating care and  documenting in the EHR, as detailed above, exclusive of CGM time.    Melonie Coughlin PA-C, MPAS   [facility]  Department of Medicine  Division of Endocrinology and Diabetes                                              Again, thank you for allowing me to participate in the care of your patient.      Sincerely,    Melonie Coughlin PA-C

## 2025-06-30 NOTE — PATIENT INSTRUCTIONS
Lower Tresiba to 39 units    Consider increasing mealtime insulin to 3.5 units/carb.      Consider Omnipod 5 pump.  [email or web address]    Emergency issues: Here are some concerns you should contact us about.  -Vomiting: more than twice.  Please check ketones.  If positive, go to ER. Monitor glucose hourly.   -High glucose (over 300 mg/dL twice in a row): Please check ketones.  If ketones are negative, take an insulin correction and recheck glucose in 1 hour.  If glucose is not coming down, please call the clinic. If ketones are moderate or large, drink lots of water, take an insulin correction 1.5 times your usual correction, and recheck ketones in 1 hour.  If ketones are still present (or you are vomiting), go to the ER.-Hypoglycemia (low glucose):   If glucose is less than 70 mg/dL, treat with 15g carb (4 glucose tablets), recheck glucose in 15 minutes.  If low again, repeat.   If glucose is less than 54 mg/dL, treat with 30g carb, recheck glucose in 15 minutes.  If low again, repeat.  Keep glucagon in your home in case of severe hypoglycemia and train someone how to use this.    Emergency kit (please ensure you always have these with you):   Glucose tablets  Glucagon  Insulin  Syringes/needles   Extra infusion set (if on a pump)  Ketone strips    Contact information:   If you have concerns, please send me a [employer] message or call the clinic at [phone].  For more urgent concerns, please call [phone] after hours/weekends and ask to speak with the endocrinologist on call.      Please let me know if you are having low blood sugars less than 70 or over 350 mg/dL.  Do not wait until your next appointment if this is happening.

## 2025-07-02 ENCOUNTER — TRANSFERRED RECORDS (OUTPATIENT)
Dept: HEALTH INFORMATION MANAGEMENT | Facility: CLINIC | Age: 55
End: 2025-07-02
Payer: COMMERCIAL

## 2025-07-12 ENCOUNTER — HEALTH MAINTENANCE LETTER (OUTPATIENT)
Age: 55
End: 2025-07-12

## 2025-07-16 ENCOUNTER — TRANSFERRED RECORDS (OUTPATIENT)
Dept: HEALTH INFORMATION MANAGEMENT | Facility: CLINIC | Age: 55
End: 2025-07-16
Payer: COMMERCIAL

## 2025-07-20 ENCOUNTER — MYC REFILL (OUTPATIENT)
Dept: ENDOCRINOLOGY | Facility: CLINIC | Age: 55
End: 2025-07-20
Payer: COMMERCIAL

## 2025-07-20 DIAGNOSIS — E10.9 WELL CONTROLLED TYPE 1 DIABETES MELLITUS (H): ICD-10-CM

## 2025-07-21 RX ORDER — PROCHLORPERAZINE 25 MG/1
SUPPOSITORY RECTAL
Qty: 1 EACH | Refills: 0 | Status: SHIPPED | OUTPATIENT
Start: 2025-07-21

## 2025-08-02 ENCOUNTER — MYC REFILL (OUTPATIENT)
Dept: ENDOCRINOLOGY | Facility: CLINIC | Age: 55
End: 2025-08-02
Payer: COMMERCIAL

## 2025-08-02 DIAGNOSIS — E10.9 TYPE 1 DIABETES MELLITUS WITHOUT COMPLICATION (H): ICD-10-CM

## 2025-08-16 DIAGNOSIS — E10.9 WELL CONTROLLED TYPE 1 DIABETES MELLITUS (H): ICD-10-CM

## 2025-08-18 RX ORDER — PROCHLORPERAZINE 25 MG/1
SUPPOSITORY RECTAL
Qty: 9 EACH | Refills: 3 | Status: SHIPPED | OUTPATIENT
Start: 2025-08-18

## 2025-08-26 ENCOUNTER — TRANSFERRED RECORDS (OUTPATIENT)
Dept: HEALTH INFORMATION MANAGEMENT | Facility: CLINIC | Age: 55
End: 2025-08-26
Payer: COMMERCIAL

## (undated) RX ORDER — REGADENOSON 0.08 MG/ML
INJECTION, SOLUTION INTRAVENOUS
Status: DISPENSED
Start: 2023-05-15